# Patient Record
Sex: MALE | Race: BLACK OR AFRICAN AMERICAN | Employment: OTHER | URBAN - NONMETROPOLITAN AREA
[De-identification: names, ages, dates, MRNs, and addresses within clinical notes are randomized per-mention and may not be internally consistent; named-entity substitution may affect disease eponyms.]

---

## 2021-02-09 ENCOUNTER — HOSPITAL ENCOUNTER (EMERGENCY)
Age: 76
Discharge: HOME | DRG: 177 | End: 2021-02-09
Payer: MEDICARE

## 2021-02-09 ENCOUNTER — APPOINTMENT (OUTPATIENT)
Dept: GENERAL RADIOLOGY | Age: 76
DRG: 177 | End: 2021-02-09
Attending: EMERGENCY MEDICINE
Payer: MEDICARE

## 2021-02-09 VITALS
HEART RATE: 77 BPM | DIASTOLIC BLOOD PRESSURE: 87 MMHG | OXYGEN SATURATION: 100 % | RESPIRATION RATE: 19 BRPM | SYSTOLIC BLOOD PRESSURE: 143 MMHG | TEMPERATURE: 98.4 F | WEIGHT: 210 LBS | HEIGHT: 68 IN | BODY MASS INDEX: 31.83 KG/M2

## 2021-02-09 DIAGNOSIS — R73.9 HYPERGLYCEMIA: ICD-10-CM

## 2021-02-09 DIAGNOSIS — J18.9 PNEUMONIA OF BOTH LUNGS DUE TO INFECTIOUS ORGANISM, UNSPECIFIED PART OF LUNG: Primary | ICD-10-CM

## 2021-02-09 LAB
ALBUMIN SERPL-MCNC: 2.4 G/DL (ref 3.5–5)
ALBUMIN/GLOB SERPL: 0.5 {RATIO} (ref 1.1–2.2)
ALP SERPL-CCNC: 67 U/L (ref 45–117)
ALT SERPL-CCNC: 22 U/L (ref 12–78)
ANION GAP SERPL CALC-SCNC: 13 MMOL/L (ref 5–15)
APPEARANCE UR: CLEAR
AST SERPL W P-5'-P-CCNC: 21 U/L (ref 15–37)
BACTERIA URNS QL MICRO: NEGATIVE /HPF
BASOPHILS # BLD: 0 K/UL (ref 0–0.2)
BASOPHILS NFR BLD: 1 % (ref 0–2.5)
BILIRUB SERPL-MCNC: 0.2 MG/DL (ref 0.2–1)
BILIRUB UR QL: NEGATIVE
BUN SERPL-MCNC: 49 MG/DL (ref 6–20)
BUN/CREAT SERPL: 15 (ref 12–20)
CA-I BLD-MCNC: 9 MG/DL (ref 8.5–10.1)
CHLORIDE SERPL-SCNC: 100 MMOL/L (ref 97–108)
CO2 SERPL-SCNC: 22 MMOL/L (ref 21–32)
COLOR UR: ABNORMAL
CREAT SERPL-MCNC: 3.35 MG/DL (ref 0.7–1.3)
DEPRECATED S PYO AG THROAT QL EIA: NEGATIVE
EOSINOPHIL # BLD: 0 K/UL (ref 0–0.7)
EOSINOPHIL NFR BLD: 0 % (ref 0.9–2.9)
ERYTHROCYTE [DISTWIDTH] IN BLOOD BY AUTOMATED COUNT: 15.7 % (ref 11.5–14.5)
ETHANOL SERPL-MCNC: <4 MG/DL
GLOBULIN SER CALC-MCNC: 5.1 G/DL (ref 2–4)
GLUCOSE BLD STRIP.AUTO-MCNC: 384 MG/DL (ref 65–100)
GLUCOSE BLD STRIP.AUTO-MCNC: 426 MG/DL (ref 65–100)
GLUCOSE BLD STRIP.AUTO-MCNC: 433 MG/DL (ref 65–100)
GLUCOSE BLD STRIP.AUTO-MCNC: 500 MG/DL (ref 65–100)
GLUCOSE SERPL-MCNC: 514 MG/DL (ref 65–100)
GLUCOSE UR STRIP.AUTO-MCNC: >1000 MG/DL
HCT VFR BLD AUTO: 34.4 % (ref 41–53)
HGB BLD-MCNC: 11.3 G/DL (ref 13.5–17.5)
HGB UR QL STRIP: ABNORMAL
INR PPP: 1 (ref 0.9–1.1)
KETONES SERPL QL: NEGATIVE
KETONES UR QL STRIP.AUTO: NEGATIVE MG/DL
LEUKOCYTE ESTERASE UR QL STRIP.AUTO: NEGATIVE
LYMPHOCYTES # BLD: 0.3 K/UL (ref 1–4.8)
LYMPHOCYTES NFR BLD: 3 % (ref 20.5–51.1)
MAGNESIUM SERPL-MCNC: 2.3 MG/DL (ref 1.6–2.4)
MCH RBC QN AUTO: 32 PG (ref 31–34)
MCHC RBC AUTO-ENTMCNC: 32.8 G/DL (ref 31–36)
MCV RBC AUTO: 97.6 FL (ref 80–100)
MONOCYTES # BLD: 0.4 K/UL (ref 0.2–2.4)
MONOCYTES NFR BLD: 4 % (ref 1.7–9.3)
NEUTS SEG # BLD: 7.3 K/UL (ref 1.8–7.7)
NEUTS SEG NFR BLD: 92 % (ref 42–75)
NITRITE UR QL STRIP.AUTO: NEGATIVE
NRBC # BLD: 0 K/UL
NRBC BLD-RTO: 0 PER 100 WBC
PERFORMED BY, TECHID: ABNORMAL
PH UR STRIP: 5.5 [PH] (ref 5–8)
PLATELET # BLD AUTO: 236 K/UL
PMV BLD AUTO: 9.2 FL (ref 6.5–11.5)
POTASSIUM SERPL-SCNC: 5.1 MMOL/L (ref 3.5–5.1)
PROT SERPL-MCNC: 7.5 G/DL (ref 6.4–8.2)
PROT UR STRIP-MCNC: >300 MG/DL
PROTHROMBIN TIME: 10.2 SEC (ref 9–11.1)
RBC # BLD AUTO: 3.53 M/UL (ref 4.5–5.9)
RBC #/AREA URNS HPF: NORMAL /HPF (ref 0–3)
SARS-COV-2, COV2: NORMAL
SODIUM SERPL-SCNC: 135 MMOL/L (ref 136–145)
SP GR UR REFRACTOMETRY: 1.02 (ref 1–1.03)
UROBILINOGEN UR QL STRIP.AUTO: 0.2 EU/DL (ref 0.2–1)
WBC # BLD AUTO: 7.9 K/UL (ref 4.4–11.3)
WBC URNS QL MICRO: NORMAL /HPF (ref 0–5)

## 2021-02-09 PROCEDURE — 99284 EMERGENCY DEPT VISIT MOD MDM: CPT

## 2021-02-09 PROCEDURE — 87880 STREP A ASSAY W/OPTIC: CPT

## 2021-02-09 PROCEDURE — 74011000258 HC RX REV CODE- 258: Performed by: EMERGENCY MEDICINE

## 2021-02-09 PROCEDURE — 80053 COMPREHEN METABOLIC PANEL: CPT

## 2021-02-09 PROCEDURE — 74011636637 HC RX REV CODE- 636/637: Performed by: EMERGENCY MEDICINE

## 2021-02-09 PROCEDURE — 81001 URINALYSIS AUTO W/SCOPE: CPT

## 2021-02-09 PROCEDURE — 96375 TX/PRO/DX INJ NEW DRUG ADDON: CPT

## 2021-02-09 PROCEDURE — 36415 COLL VENOUS BLD VENIPUNCTURE: CPT

## 2021-02-09 PROCEDURE — 82009 KETONE BODYS QUAL: CPT

## 2021-02-09 PROCEDURE — 71045 X-RAY EXAM CHEST 1 VIEW: CPT

## 2021-02-09 PROCEDURE — U0003 INFECTIOUS AGENT DETECTION BY NUCLEIC ACID (DNA OR RNA); SEVERE ACUTE RESPIRATORY SYNDROME CORONAVIRUS 2 (SARS-COV-2) (CORONAVIRUS DISEASE [COVID-19]), AMPLIFIED PROBE TECHNIQUE, MAKING USE OF HIGH THROUGHPUT TECHNOLOGIES AS DESCRIBED BY CMS-2020-01-R: HCPCS

## 2021-02-09 PROCEDURE — 82077 ASSAY SPEC XCP UR&BREATH IA: CPT

## 2021-02-09 PROCEDURE — 74011250636 HC RX REV CODE- 250/636: Performed by: EMERGENCY MEDICINE

## 2021-02-09 PROCEDURE — 96374 THER/PROPH/DIAG INJ IV PUSH: CPT

## 2021-02-09 PROCEDURE — 83735 ASSAY OF MAGNESIUM: CPT

## 2021-02-09 PROCEDURE — 82962 GLUCOSE BLOOD TEST: CPT

## 2021-02-09 PROCEDURE — 85025 COMPLETE CBC W/AUTO DIFF WBC: CPT

## 2021-02-09 PROCEDURE — 96376 TX/PRO/DX INJ SAME DRUG ADON: CPT

## 2021-02-09 PROCEDURE — 87070 CULTURE OTHR SPECIMN AEROBIC: CPT

## 2021-02-09 PROCEDURE — 85610 PROTHROMBIN TIME: CPT

## 2021-02-09 RX ORDER — AZITHROMYCIN 500 MG/1
TABLET, FILM COATED ORAL
Qty: 5 TAB | Refills: 0 | Status: SHIPPED | OUTPATIENT
Start: 2021-02-09 | End: 2021-02-17

## 2021-02-09 RX ORDER — DEXAMETHASONE SODIUM PHOSPHATE 4 MG/ML
6 INJECTION, SOLUTION INTRA-ARTICULAR; INTRALESIONAL; INTRAMUSCULAR; INTRAVENOUS; SOFT TISSUE
Status: COMPLETED | OUTPATIENT
Start: 2021-02-09 | End: 2021-02-09

## 2021-02-09 RX ORDER — PREDNISONE 20 MG/1
40 TABLET ORAL DAILY
Qty: 10 TAB | Refills: 0 | Status: SHIPPED | OUTPATIENT
Start: 2021-02-09 | End: 2021-02-17

## 2021-02-09 RX ADMIN — CEFTRIAXONE SODIUM 1 G: 1 INJECTION, POWDER, FOR SOLUTION INTRAMUSCULAR; INTRAVENOUS at 15:53

## 2021-02-09 RX ADMIN — INSULIN HUMAN 15 UNITS: 100 INJECTION, SOLUTION PARENTERAL at 13:26

## 2021-02-09 RX ADMIN — INSULIN HUMAN 15 UNITS: 100 INJECTION, SOLUTION PARENTERAL at 15:53

## 2021-02-09 RX ADMIN — DEXAMETHASONE SODIUM PHOSPHATE 6 MG: 4 INJECTION, SOLUTION INTRAMUSCULAR; INTRAVENOUS at 15:56

## 2021-02-09 RX ADMIN — SODIUM CHLORIDE 1000 ML: 9 INJECTION, SOLUTION INTRAVENOUS at 12:46

## 2021-02-09 NOTE — ED PROVIDER NOTES
EMERGENCY DEPARTMENT HISTORY AND PHYSICAL EXAM      Date: 2/9/2021  Patient Name: Ronne Lefort    History of Presenting Illness     Chief Complaint   Patient presents with    Generalized Body Aches       History Provided By: Patient    HPI: Ronne Lefort, 70 y.o. male with a past medical history significant diabetes, hypertension and COPD presents to the ED with cc of cough and sore throat for 3 days cough is nonproductive; patient states he has not eaten for 3 days and therefore has not taken his insulin; denies any significant weight loss    There are no other complaints, changes, or physical findings at this time. PCP: Landen, MD Carrie    No current facility-administered medications on file prior to encounter. Current Outpatient Medications on File Prior to Encounter   Medication Sig Dispense Refill    traMADol (ULTRAM) 50 mg tablet Take 50 mg by mouth every six (6) hours as needed for Pain.  insulin glargine (LANTUS) 100 unit/mL injection 34 Units by SubCUTAneous route nightly.  insulin aspart (NOVOLOG) 100 unit/mL injection by SubCUTAneous route. 26 units in the am, 22 units at noon      gabapentin (NEURONTIN) 300 mg capsule Take 300 mg by mouth three (3) times daily.  cholecalciferol (VITAMIN D3) 1,000 unit cap Take 1,000 Units by mouth daily.  aspirin delayed-release 81 mg tablet Take 81 mg by mouth daily.  methadone (DOLOPHINE) 10 mg tablet Take 10 mg by mouth four (4) times daily.  oxyCODONE-acetaminophen (PERCOCET 10)  mg per tablet Take 1 Tab by mouth every six (6) hours as needed for Pain.  ondansetron (ZOFRAN ODT) 4 mg disintegrating tablet Take 1 Tab by mouth every eight (8) hours as needed for Nausea. 12 Tab 0    [DISCONTINUED] famotidine (PEPCID) 20 mg tablet Take 20 mg by mouth two (2) times a day.          Past History     Past Medical History:  Past Medical History:   Diagnosis Date    Asthma     B12 deficiency     CAD (coronary artery disease)     Cataract     Chronic obstructive pulmonary disease (HCC)     CKD (chronic kidney disease)     Depression     [facility] [address]     DJD (degenerative joint disease)     Heart attack ([facility] [address]     Hypertension     Long term prescription opiate use     Neuropathy     Rheumatoid arthritis (HCC)     Ulcer        Past Surgical History:  Past Surgical History:   Procedure Laterality Date    HX ANGIOPLASTY      HX HEENT      HX ORTHOPAEDIC      MA CARDIAC SURG PROCEDURE UNLIST      RCA stent       Family History:  History reviewed. No pertinent family history. Social History:  Social History     Tobacco Use    Smoking status: Former Smoker    Smokeless tobacco: Former User   Substance Use Topics    Alcohol use: No    Drug use: No       Allergies:  No Known Allergies      Review of Systems     Review of Systems   Constitutional: Positive for appetite change. Negative for fever. HENT: Positive for sore throat. Negative for rhinorrhea. Eyes: Negative for discharge and visual disturbance. Respiratory: Negative for cough and shortness of breath. Cardiovascular: Negative for chest pain and leg swelling. Gastrointestinal: Positive for nausea. Negative for diarrhea. Endocrine: Negative for polydipsia and polyuria. Genitourinary: Negative for dysuria and urgency. Musculoskeletal: Negative for back pain and neck pain. Skin: Negative for color change and pallor. Neurological: Negative for weakness and numbness. Psychiatric/Behavioral: Negative. Physical Exam     Physical Exam  Vitals signs and nursing note reviewed. Constitutional:       Appearance: Normal appearance. HENT:      Head: Normocephalic and atraumatic. Mouth/Throat:      Mouth: Mucous membranes are dry. Eyes:      Extraocular Movements: Extraocular movements intact. Conjunctiva/sclera: Conjunctivae normal.      Pupils: Pupils are equal, round, and reactive to light.    Neck:      Musculoskeletal: Normal range of motion and neck supple. Cardiovascular:      Rate and Rhythm: Normal rate and regular rhythm. Pulses: Normal pulses. Heart sounds: Normal heart sounds. Pulmonary:      Effort: Pulmonary effort is normal.      Breath sounds: Normal breath sounds. Abdominal:      General: Bowel sounds are normal.      Palpations: Abdomen is soft. Musculoskeletal: Normal range of motion. General: No tenderness or signs of injury. Skin:     General: Skin is warm and dry. Capillary Refill: Capillary refill takes less than 2 seconds. Neurological:      General: No focal deficit present. Mental Status: He is alert and oriented to person, place, and time. Psychiatric:         Mood and Affect: Mood normal.         Behavior: Behavior normal.         Lab and Diagnostic Study Results     Labs -     Recent Results (from the past 12 hour(s))   GLUCOSE, POC    Collection Time: 02/09/21 11:42 AM   Result Value Ref Range    Glucose (POC) 500 (H) 65 - 100 mg/dL    Performed by Wenda Bloch        Radiologic Studies -   @lastxrresult@  CT Results  (Last 48 hours)    None        CXR Results  (Last 48 hours)    None            Medical Decision Making   - I am the first provider for this patient. - I reviewed the vital signs, available nursing notes, past medical history, past surgical history, family history and social history. - Initial assessment performed. The patients presenting problems have been discussed, and they are in agreement with the care plan formulated and outlined with them. I have encouraged them to ask questions as they arise throughout their visit. Vital Signs-Reviewed the patient's vital signs.   Patient Vitals for the past 12 hrs:   Temp Pulse Resp BP SpO2   02/09/21 1130 98.4 °F (36.9 °C) (!) 108 17 136/88 94 %       Records Reviewed: Nursing Notes    The patient presents with body aches with a differential diagnosis of pneumonia, influenza, COVID-19      ED Course:     ED Course as of Feb 09 1555   Tue Feb 09, 2021   1457 NEUTROPHILS(!): 80 [SB]      ED Course User Index  [SB] Duncan Perez MD       Provider Notes (Medical Decision Making):     MDM  Number of Diagnoses or Management Options  Hyperglycemia  Pneumonia of both lungs due to infectious organism, unspecified part of lung  Diagnosis management comments: Patient was called today and informed of results of his Covid test         Procedures   Medical Decision Makingedical Decision Making  Performed by: Rohit Beavers MD  PROCEDURES:  Procedures       Disposition   Disposition: Condition stable and improved  DC- Adult Discharges: All of the diagnostic tests were reviewed and questions answered. Diagnosis, care plan and treatment options were discussed. The patient understands the instructions and will follow up as directed. The patients results have been reviewed with them. They have been counseled regarding their diagnosis. The patient verbally convey understanding and agreement of the signs, symptoms, diagnosis, treatment and prognosis and additionally agrees to follow up as recommended with their PCP in 24 - 48 hours. They also agree with the care-plan and convey that all of their questions have been answered. I have also put together some discharge instructions for them that include: 1) educational information regarding their diagnosis, 2) how to care for their diagnosis at home, as well a 3) list of reasons why they would want to return to the ED prior to their follow-up appointment, should their condition change. DISCHARGE PLAN:  1. Current Discharge Medication List      CONTINUE these medications which have NOT CHANGED    Details   traMADol (ULTRAM) 50 mg tablet Take 50 mg by mouth every six (6) hours as needed for Pain. insulin glargine (LANTUS) 100 unit/mL injection 34 Units by SubCUTAneous route nightly.       insulin aspart (NOVOLOG) 100 unit/mL injection by SubCUTAneous route. 26 units in the am, 22 units at noon      gabapentin (NEURONTIN) 300 mg capsule Take 300 mg by mouth three (3) times daily. cholecalciferol (VITAMIN D3) 1,000 unit cap Take 1,000 Units by mouth daily. aspirin delayed-release 81 mg tablet Take 81 mg by mouth daily. methadone (DOLOPHINE) 10 mg tablet Take 10 mg by mouth four (4) times daily. oxyCODONE-acetaminophen (PERCOCET 10)  mg per tablet Take 1 Tab by mouth every six (6) hours as needed for Pain. ondansetron (ZOFRAN ODT) 4 mg disintegrating tablet Take 1 Tab by mouth every eight (8) hours as needed for Nausea. Qty: 12 Tab, Refills: 0           2. Follow-up Information    None       3. Return to ED if worse   4. Current Discharge Medication List            Diagnosis     Clinical Impression: No diagnosis found. Attestations:    Maury Jose MD    Please note that this dictation was completed with [employer], the computer voice recognition software. Quite often unanticipated grammatical, syntax, homophones, and other interpretive errors are inadvertently transcribed by the computer software. Please disregard these errors. Please excuse any errors that have escaped final proofreading. Thank you.

## 2021-02-09 NOTE — ED TRIAGE NOTES
09/12/20 1140   Final Note   Assessment Type Final Discharge Note   Anticipated Discharge Disposition Home   What phone number can be called within the next 1-3 days to see how you are doing after discharge?   ([phone])   Hospital Follow Up  Appt(s) scheduled? Yes   Discharge plans and expectations educations in teach back method with documentation complete? Yes   Right Care Referral Info   Post Acute Recommendation No Care   Post-Acute Status   Post-Acute Authorization Other  (Home with follow-up)   Other Status No Post-Acute Service Needs   Discharge Delays None known at this time      Per EMS pts FS glucose was 587.

## 2021-02-09 NOTE — ED NOTES
Pt given discharge and follow up instructions. Pt educated on use of pulse oximeter. Pt able to show return demonstration. Pt advised that a healthcare professional will be following up with him in 24-48hrs regarding readings. Pt shows verbal understanding. Pt given prescriptions at discharge. Pt has no other questions at this time. Pt waiting for daughter to pick him up for discharge.

## 2021-02-10 ENCOUNTER — PATIENT OUTREACH (OUTPATIENT)
Dept: CASE MANAGEMENT | Age: 76
End: 2021-02-10

## 2021-02-10 NOTE — PROGRESS NOTES
Patient contacted regarding [facility]. Discussed COVID-19 related testing which was pending at this time. Test results were pending. Patient informed of results, if available? yes     Care Transition Nurse/ Ambulatory Care Manager contacted the patient by telephone to perform post discharge assessment. Call within 2 business days of discharge: Yes Verified name and  with patient as identifiers. Provided introduction to self, and explanation of the CTN/ACM role, and reason for call due to risk factors for infection and/or exposure to COVID-19. Symptoms reviewed with patient who verbalized the following symptoms: no new symptoms and no worsening symptoms      Due to no new or worsening symptoms encounter was not routed to provider for escalation. Discussed follow-up appointments. If no appointment was previously scheduled, appointment scheduling offered:  Pt's daughter reports that they are working on getting pt a [facility] follow up appointment(s): No future appointments. Non-[facility] follow up appointment(s): NA     Advance Care Planning:   Does patient have an Advance Directive:  not on file. Patient has following risk factors of: pneumonia, diabetes and ED visit to [facility] on [facility]. CTN/ACM reviewed discharge instructions, medical action plan and red flags such as increased shortness of breath, increasing fever and signs of decompensation with patient who verbalized understanding. Discussed exposure protocols and quarantine with CDC Guidelines What to do if you are sick with coronavirus disease .  Patient was given an opportunity for questions and concerns. The patient agrees to contact the [employer] exposure line [phone], pt's daughter reports that they will follow up with PCP and PCP office for questions related to their healthcare. CTN/ACM provided contact information for future needs.     Reviewed and educated patient on any new and changed medications related to discharge diagnosis Patient/family/caregiver given information for [employer] and agrees to enroll no      Plan for follow-up call in 5-7 days based on severity of symptoms and risk factors. Pt confirmed that he has ED prescribed medications and has maintenance medications. Pt attempted to use pulse ox but his hands are too cold to get a reading. Pt's daughter reports that she will watch this. They declined [facility] and pt reports they have a box at home to help pt. She did not clarify what this box is. Pt and daughter are agreeable to a follow up call in a week and was reminded to seek emergent care for SOB. Will call in 7 days.

## 2021-02-11 ENCOUNTER — PATIENT OUTREACH (OUTPATIENT)
Dept: CASE MANAGEMENT | Age: 76
End: 2021-02-11

## 2021-02-11 ENCOUNTER — HOSPITAL ENCOUNTER (EMERGENCY)
Age: 76
Discharge: HOME | DRG: 177 | End: 2021-02-11
Attending: EMERGENCY MEDICINE
Payer: MEDICARE

## 2021-02-11 VITALS
OXYGEN SATURATION: 96 % | RESPIRATION RATE: 20 BRPM | WEIGHT: 210 LBS | DIASTOLIC BLOOD PRESSURE: 81 MMHG | SYSTOLIC BLOOD PRESSURE: 153 MMHG | HEART RATE: 98 BPM | BODY MASS INDEX: 31.83 KG/M2 | TEMPERATURE: 99 F | HEIGHT: 68 IN

## 2021-02-11 DIAGNOSIS — J12.82 PNEUMONIA DUE TO COVID-19 VIRUS: Primary | ICD-10-CM

## 2021-02-11 DIAGNOSIS — U07.1 PNEUMONIA DUE TO COVID-19 VIRUS: Primary | ICD-10-CM

## 2021-02-11 LAB
BACTERIA SPEC CULT: NORMAL
BACTERIA SPEC CULT: NORMAL
SARS-COV-2, COV2NT: DETECTED
SPECIAL REQUESTS,SREQ: NORMAL

## 2021-02-11 PROCEDURE — 99283 EMERGENCY DEPT VISIT LOW MDM: CPT

## 2021-02-11 RX ORDER — OMEPRAZOLE 40 MG/1
40 CAPSULE, DELAYED RELEASE ORAL DAILY
COMMUNITY

## 2021-02-11 NOTE — PROGRESS NOTES
Patient contacted regarding COVID-19 risk and screening. Discussed COVID-19 related testing which was available at this time. Test results were positive. Patient informed of results, if available? Yes, pt confirmed that hospital called with results. Care Transition Nurse/ Ambulatory Care Manager/ LPN Care Coordinator contacted the patient by telephone to perform follow-up assessment. Verified name and  with patient as identifiers. Patient has following risk factors of: asthma, diabetes, chronic kidney disease and ED visit and COVID19 + test result. Symptoms reviewed with patient who verbalized the following symptoms: no new symptoms and no worsening symptoms. Due to no new or worsening symptoms encounter pt's daughter reports that pt is changing PCPs on last call routed to provider for escalation. Education provided regarding infection prevention, and signs and symptoms of COVID-19 and when to seek medical attention with patient who verbalized understanding. Discussed exposure protocols and quarantine from [address] you at higher risk for severe illness  and given an opportunity for questions and concerns. The patient agrees to contact the COVID-19 hotline [phone] or PCP office for questions related to their healthcare. CTN/ACM/LPN provided contact information for future reference. From CDC: Are you at higher risk for severe illness?  Wash your hands often.  Avoid close contact (6 feet, which is about two arm lengths) with people who are sick.  Put distance between yourself and other people if COVID-19 is spreading in your community.  Clean and disinfect frequently touched surfaces.  Avoid all cruise travel and non-essential air travel.  Call your healthcare professional if you have concerns about COVID-19 and your underlying condition or if you are sick.     For more information on steps you can take to protect yourself, see CDC's How to Protect Yourself Plan for follow-up call in 3-5 days based on severity of symptoms and risk factors. Called pt to follow up on ED discharge and to check on pulse oximeter readings. Advised pt to warm hands by washing with warm water, pt reports that he is self-quarantined to a room and cannot get to bathroom. Pt advised to try rubbing his hands together, placing them in his armpits or under the blankets. Pt is attempting to warm hands. Pt states that he sees a 96 and a question jewell. Pt is speaking in complete sentences. Pt reports [facility] at [facility]. Pt states that he is alone. Pt reports that he can only see one number. Pt reports that his daughter will be coming over later today. Pt is able to run his hands under warm water. Still trying to get pulse ox reading. Pt reports spo2 at 89%  Pt reports that it is no 94% after sitting. Pt reports that while sitting and talking with me it is between 92 and 93%. Used teach back with pt who states that he will call 911 for any worsening symptoms and spo2 below 90%.

## 2021-02-11 NOTE — ED TRIAGE NOTES
Pt was recently diagnosed with Covid and given a pulse ox to take home. States that his O2 sat was low at home. Pt's fingers are cold. O2 sat is currently 96% on room air.

## 2021-02-12 ENCOUNTER — PATIENT OUTREACH (OUTPATIENT)
Dept: CASE MANAGEMENT | Age: 76
End: 2021-02-12

## 2021-02-12 NOTE — ED NOTES
[facility]

## 2021-02-12 NOTE — PROGRESS NOTES
Called pt to follow up on ED visit to [facility] on 2/11/2021. Unable to LVM on pt's telephone. Called emergency contact, unable to LVM as VM is not set up.

## 2021-02-12 NOTE — ED PROVIDER NOTES
EMERGENCY DEPARTMENT HISTORY AND PHYSICAL EXAM      Date: 2/11/2021  Patient Name: Huong Arias    History of Presenting Illness     Chief Complaint   Patient presents with    Positive For Covid-19    Cough       History Provided By: Patient    HPI: Huong Arias, 70 y.o. male with a past medical history significant COVID-19 pneumonia presents to the ED with cc of pulse ox saturation readings of 89% at home; patient denies any worsening of his shortness of breath no cough no fever no chills    There are no other complaints, changes, or physical findings at this time. PCP: Landen, MD Carrie    No current facility-administered medications on file prior to encounter. Current Outpatient Medications on File Prior to Encounter   Medication Sig Dispense Refill    omeprazole (PRILOSEC) 40 mg capsule Take 40 mg by mouth daily.  azithromycin (Zithromax TRI-MERCEDEZ) 500 mg tab 1 tab p.o. daily for 5 days 5 Tab 0    predniSONE (DELTASONE) 20 mg tablet Take 40 mg by mouth daily for 5 days. With Breakfast 10 Tab 0    traMADol (ULTRAM) 50 mg tablet Take 50 mg by mouth every six (6) hours as needed for Pain.  insulin glargine (LANTUS) 100 unit/mL injection 60 Units by SubCUTAneous route nightly.  insulin aspart (NOVOLOG) 100 unit/mL injection 26 Units by SubCUTAneous route Every morning.  gabapentin (NEURONTIN) 300 mg capsule Take 300 mg by mouth three (3) times daily.  cholecalciferol (VITAMIN D3) 1,000 unit cap Take 1,000 Units by mouth daily.  aspirin delayed-release 81 mg tablet Take 81 mg by mouth daily.  methadone (DOLOPHINE) 10 mg tablet Take 10 mg by mouth four (4) times daily.  ondansetron (ZOFRAN ODT) 4 mg disintegrating tablet Take 1 Tab by mouth every eight (8) hours as needed for Nausea.  12 Tab 0       Past History     Past Medical History:  Past Medical History:   Diagnosis Date    Asthma     B12 deficiency     CAD (coronary artery disease)     Cataract     Chronic obstructive pulmonary disease (HCC)     CKD (chronic kidney disease)     Depression     Diabetes ([facility] [address]     DJD (degenerative joint disease)     Heart attack ([facility] [address]     Hypertension     Long term prescription opiate use     Neuropathy     Rheumatoid arthritis (HCC)     Ulcer        Past Surgical History:  Past Surgical History:   Procedure Laterality Date    HX ANGIOPLASTY      HX HEENT      HX ORTHOPAEDIC      CA CARDIAC SURG PROCEDURE UNLIST      RCA stent       Family History:  History reviewed. No pertinent family history. Social History:  Social History     Tobacco Use    Smoking status: Former Smoker    Smokeless tobacco: Former User   Substance Use Topics    Alcohol use: No    Drug use: No       Allergies:  No Known Allergies      Review of Systems     Review of Systems   Constitutional: Negative for chills and fever. HENT: Negative for rhinorrhea and sore throat. Eyes: Negative for photophobia and discharge. Respiratory: Negative for cough and shortness of breath. Cardiovascular: Negative for chest pain and leg swelling. Gastrointestinal: Negative for vomiting. Endocrine: Negative for polydipsia and polyuria. Genitourinary: Negative for dysuria and urgency. Musculoskeletal: Negative for back pain and neck pain. Skin: Negative for color change and pallor. Neurological: Negative for weakness and numbness. Psychiatric/Behavioral: Negative. Physical Exam     Physical Exam  Vitals signs and nursing note reviewed. Constitutional:       Appearance: Normal appearance. HENT:      Head: Normocephalic and atraumatic. Mouth/Throat:      Mouth: Mucous membranes are moist.      Pharynx: Oropharynx is clear. Eyes:      Extraocular Movements: Extraocular movements intact. Conjunctiva/sclera: Conjunctivae normal.      Pupils: Pupils are equal, round, and reactive to light. Neck:      Musculoskeletal: Normal range of motion and neck supple. Cardiovascular:      Rate and Rhythm: Normal rate and regular rhythm. Pulses: Normal pulses. Heart sounds: Normal heart sounds. Pulmonary:      Effort: Pulmonary effort is normal.      Breath sounds: Normal breath sounds. Abdominal:      General: Bowel sounds are normal.      Palpations: Abdomen is soft. Musculoskeletal: Normal range of motion. Skin:     General: Skin is warm and dry. Capillary Refill: Capillary refill takes less than 2 seconds. Neurological:      General: No focal deficit present. Mental Status: He is alert and oriented to person, place, and time. Psychiatric:         Mood and Affect: Mood normal.         Behavior: Behavior normal.         Lab and Diagnostic Study Results     Labs -   No results found for this or any previous visit (from the past 12 hour(s)). Radiologic Studies -   @lastxrresult@  CT Results  (Last 48 hours)    None        CXR Results  (Last 48 hours)    None            Medical Decision Making   - I am the first provider for this patient. - I reviewed the vital signs, available nursing notes, past medical history, past surgical history, family history and social history. - Initial assessment performed. The patients presenting problems have been discussed, and they are in agreement with the care plan formulated and outlined with them. I have encouraged them to ask questions as they arise throughout their visit. Vital Signs-Reviewed the patient's vital signs. No data found. Records Reviewed: Nursing Notes    The patient presents with shortness of breath with a differential diagnosis of pneumonia, influenza, CHF      ED Course:          Provider Notes (Medical Decision Making): MDM       Procedures   Medical Decision Makingedical Decision Making  Performed by: Claudia Brannon MD  PROCEDURES:  Procedures       Disposition   Disposition: Condition stable and resolved  DC- Adult Discharges:  All of the diagnostic tests were reviewed and questions answered. Diagnosis, care plan and treatment options were discussed. The patient understands the instructions and will follow up as directed. The patients results have been reviewed with them. They have been counseled regarding their diagnosis. The patient verbally convey understanding and agreement of the signs, symptoms, diagnosis, treatment and prognosis and additionally agrees to follow up as recommended with their PCP in 24 - 48 hours. They also agree with the care-plan and convey that all of their questions have been answered. I have also put together some discharge instructions for them that include: 1) educational information regarding their diagnosis, 2) how to care for their diagnosis at home, as well a 3) list of reasons why they would want to return to the ED prior to their follow-up appointment, should their condition change. Discharged    DISCHARGE PLAN:  1. Cannot display discharge medications since this patient is not currently admitted. 2.   Follow-up Information    None       3. Return to ED if worse   4. Discharge Medication List as of 2/11/2021  8:47 PM            Diagnosis     Clinical Impression:   1. Pneumonia due to COVID-19 virus        Attestations:    Tati Luther MD    Please note that this dictation was completed with [employer], the [employer] voice recognition software. Quite often unanticipated grammatical, syntax, homophones, and other interpretive errors are inadvertently transcribed by the computer software. Please disregard these errors. Please excuse any errors that have escaped final proofreading. Thank you.

## 2021-02-12 NOTE — PROGRESS NOTES
Called pt to follow up on pulse ox reading. Pt verified name and . Pt states 96%. Pt is agreeable to a follow up call on Tuesday.

## 2021-02-12 NOTE — ED NOTES
Pt is alert and oriented x4, NAD observed at this time. Pt was given and educated on discharge instructions, and discharge instructions were also relayed and educated to pts daughter. Pt wheeled out to car driven by daughter in wheelchair to maintain respiratory effort, but pt ambulated to chair in hallway from bed in room and from wheelchair to car with no help.

## 2021-02-12 NOTE — PROGRESS NOTES
Patient contacted regarding COVID-19 risk and screening. Discussed COVID-19 related testing which was available at this time. Test results were positive. Patient informed of results, if available? yes     Care Transition Nurse/ Ambulatory Care Manager/ LPN Care Coordinator contacted the patient by telephone to perform follow-up assessment. Verified name and  with patient as identifiers. Patient has following risk factors of: COPD, asthma, diabetes, chronic kidney disease and ED visits with diagnosis of PNA due to [address]. Symptoms reviewed with patient who verbalized the following symptoms: no new symptoms, no worsening symptoms and pt states, \"I don't feel too good. \". Due to no new or worsening symptoms encounter was not routed to provider for escalation. Education provided regarding infection prevention, and signs and symptoms of COVID-19 and when to seek medical attention with patient who verbalized understanding. Discussed exposure protocols and quarantine from [address] you at higher risk for severe illness  and given an opportunity for questions and concerns. The patient agrees to contact the COVID-19 hotline [phone] or PCP office for questions related to their healthcare. CTN/ACM/LPN provided contact information for future reference. From CDC: Are you at higher risk for severe illness?  Wash your hands often.  Avoid close contact (6 feet, which is about two arm lengths) with people who are sick.  Put distance between yourself and other people if COVID-19 is spreading in your community.  Clean and disinfect frequently touched surfaces.  Avoid all cruise travel and non-essential air travel.  Call your healthcare professional if you have concerns about COVID-19 and your underlying condition or if you are sick.     For more information on steps you can take to protect yourself, see CDC's How to [place] for follow-up call in 3-5 days based on severity of symptoms and risk factors.    Called pt to follow up on ED visit on 2/11/2021 and to follow up on pulse oximeter reading.  Pt reports that he is feeling about the same, \"I don't feel good.\"  Pt has cool hands and it having a difficult time getting a reading.  Pt has washed his hands with warm water.  Pt reports that he dropped the pulse ox and is waiting for his daughter to put it back together as the batteries came out and pt reports that his hands don't work too well.  Pt will call me back.

## 2021-02-13 ENCOUNTER — APPOINTMENT (OUTPATIENT)
Dept: GENERAL RADIOLOGY | Age: 76
DRG: 177 | End: 2021-02-13
Attending: EMERGENCY MEDICINE
Payer: MEDICARE

## 2021-02-13 ENCOUNTER — HOSPITAL ENCOUNTER (INPATIENT)
Age: 76
LOS: 4 days | Discharge: HOME | DRG: 177 | End: 2021-02-17
Attending: EMERGENCY MEDICINE | Admitting: HOSPITALIST
Payer: MEDICARE

## 2021-02-13 DIAGNOSIS — U07.1 COVID-19: Primary | ICD-10-CM

## 2021-02-13 DIAGNOSIS — E16.2 HYPOGLYCEMIA: ICD-10-CM

## 2021-02-13 DIAGNOSIS — E86.0 DEHYDRATION: ICD-10-CM

## 2021-02-13 PROBLEM — R79.89 POSITIVE D DIMER: Status: ACTIVE | Noted: 2021-02-13

## 2021-02-13 PROBLEM — J44.9 COPD (CHRONIC OBSTRUCTIVE PULMONARY DISEASE) (HCC): Status: ACTIVE | Noted: 2021-02-13

## 2021-02-13 PROBLEM — N18.9 CKD (CHRONIC KIDNEY DISEASE): Status: ACTIVE | Noted: 2021-02-13

## 2021-02-13 PROBLEM — R06.00 DYSPNEA: Status: ACTIVE | Noted: 2021-02-13

## 2021-02-13 LAB
ANION GAP SERPL CALC-SCNC: 11 MMOL/L (ref 5–15)
BASOPHILS # BLD: 0 K/UL (ref 0–0.2)
BASOPHILS NFR BLD: 0 % (ref 0–2.5)
BUN SERPL-MCNC: 55 MG/DL (ref 6–20)
BUN/CREAT SERPL: 21 (ref 12–20)
CA-I BLD-MCNC: 8.8 MG/DL (ref 8.5–10.1)
CHLORIDE SERPL-SCNC: 108 MMOL/L (ref 97–108)
CO2 SERPL-SCNC: 24 MMOL/L (ref 21–32)
CREAT SERPL-MCNC: 2.63 MG/DL (ref 0.7–1.3)
D DIMER PPP FEU-MCNC: 4.72 MG/L FEU (ref 0.19–0.5)
EOSINOPHIL # BLD: 0 K/UL (ref 0–0.7)
EOSINOPHIL NFR BLD: 0 % (ref 0.9–2.9)
ERYTHROCYTE [DISTWIDTH] IN BLOOD BY AUTOMATED COUNT: 15.5 % (ref 11.5–14.5)
GLUCOSE BLD STRIP.AUTO-MCNC: 273 MG/DL (ref 65–100)
GLUCOSE BLD STRIP.AUTO-MCNC: 317 MG/DL (ref 65–100)
GLUCOSE BLD STRIP.AUTO-MCNC: 62 MG/DL (ref 65–100)
GLUCOSE SERPL-MCNC: 63 MG/DL (ref 65–100)
HCT VFR BLD AUTO: 35 % (ref 41–53)
HGB BLD-MCNC: 11.6 G/DL (ref 13.5–17.5)
INR PPP: 1 (ref 0.9–1.1)
LYMPHOCYTES # BLD: 0.8 K/UL (ref 1–4.8)
LYMPHOCYTES NFR BLD: 11 % (ref 20.5–51.1)
MAGNESIUM SERPL-MCNC: 2.1 MG/DL (ref 1.6–2.4)
MCH RBC QN AUTO: 31.8 PG (ref 31–34)
MCHC RBC AUTO-ENTMCNC: 33.1 G/DL (ref 31–36)
MCV RBC AUTO: 96.1 FL (ref 80–100)
MONOCYTES # BLD: 0.8 K/UL (ref 0.2–2.4)
MONOCYTES NFR BLD: 11 % (ref 1.7–9.3)
NEUTS SEG # BLD: 5.8 K/UL (ref 1.8–7.7)
NEUTS SEG NFR BLD: 78 % (ref 42–75)
NRBC # BLD: 0 K/UL
NRBC BLD-RTO: 0 PER 100 WBC
PERFORMED BY, TECHID: ABNORMAL
PLATELET # BLD AUTO: 397 K/UL
PMV BLD AUTO: 8.2 FL (ref 6.5–11.5)
POTASSIUM SERPL-SCNC: 3.6 MMOL/L (ref 3.5–5.1)
PROTHROMBIN TIME: 9.7 SEC (ref 9–11.1)
RBC # BLD AUTO: 3.64 M/UL (ref 4.5–5.9)
SODIUM SERPL-SCNC: 143 MMOL/L (ref 136–145)
WBC # BLD AUTO: 7.5 K/UL (ref 4.4–11.3)

## 2021-02-13 PROCEDURE — 85025 COMPLETE CBC W/AUTO DIFF WBC: CPT

## 2021-02-13 PROCEDURE — 36415 COLL VENOUS BLD VENIPUNCTURE: CPT

## 2021-02-13 PROCEDURE — 83735 ASSAY OF MAGNESIUM: CPT

## 2021-02-13 PROCEDURE — 82962 GLUCOSE BLOOD TEST: CPT

## 2021-02-13 PROCEDURE — 71045 X-RAY EXAM CHEST 1 VIEW: CPT

## 2021-02-13 PROCEDURE — 74011250637 HC RX REV CODE- 250/637: Performed by: HOSPITALIST

## 2021-02-13 PROCEDURE — 74011636637 HC RX REV CODE- 636/637: Performed by: HOSPITALIST

## 2021-02-13 PROCEDURE — 85610 PROTHROMBIN TIME: CPT

## 2021-02-13 PROCEDURE — 3E0333Z INTRODUCTION OF ANTI-INFLAMMATORY INTO PERIPHERAL VEIN, PERCUTANEOUS APPROACH: ICD-10-PCS | Performed by: HOSPITALIST

## 2021-02-13 PROCEDURE — 99284 EMERGENCY DEPT VISIT MOD MDM: CPT

## 2021-02-13 PROCEDURE — 85379 FIBRIN DEGRADATION QUANT: CPT

## 2021-02-13 PROCEDURE — 83036 HEMOGLOBIN GLYCOSYLATED A1C: CPT

## 2021-02-13 PROCEDURE — 84145 PROCALCITONIN (PCT): CPT

## 2021-02-13 PROCEDURE — 80048 BASIC METABOLIC PNL TOTAL CA: CPT

## 2021-02-13 PROCEDURE — 86140 C-REACTIVE PROTEIN: CPT

## 2021-02-13 PROCEDURE — 65270000029 HC RM PRIVATE

## 2021-02-13 PROCEDURE — 94640 AIRWAY INHALATION TREATMENT: CPT

## 2021-02-13 RX ORDER — SODIUM CHLORIDE 0.9 % (FLUSH) 0.9 %
5-40 SYRINGE (ML) INJECTION EVERY 8 HOURS
Status: DISCONTINUED | OUTPATIENT
Start: 2021-02-13 | End: 2021-02-17 | Stop reason: HOSPADM

## 2021-02-13 RX ORDER — ACETAMINOPHEN 650 MG/1
650 SUPPOSITORY RECTAL
Status: DISCONTINUED | OUTPATIENT
Start: 2021-02-13 | End: 2021-02-17 | Stop reason: HOSPADM

## 2021-02-13 RX ORDER — AMLODIPINE BESYLATE 5 MG/1
5 TABLET ORAL DAILY
Status: DISCONTINUED | OUTPATIENT
Start: 2021-02-13 | End: 2021-02-17 | Stop reason: HOSPADM

## 2021-02-13 RX ORDER — SODIUM CHLORIDE 0.9 % (FLUSH) 0.9 %
5-40 SYRINGE (ML) INJECTION AS NEEDED
Status: DISCONTINUED | OUTPATIENT
Start: 2021-02-13 | End: 2021-02-17 | Stop reason: HOSPADM

## 2021-02-13 RX ORDER — ASCORBIC ACID 500 MG
1000 TABLET ORAL 2 TIMES DAILY
Status: DISCONTINUED | OUTPATIENT
Start: 2021-02-13 | End: 2021-02-17 | Stop reason: HOSPADM

## 2021-02-13 RX ORDER — INSULIN GLARGINE 100 [IU]/ML
60 INJECTION, SOLUTION SUBCUTANEOUS
Status: DISCONTINUED | OUTPATIENT
Start: 2021-02-13 | End: 2021-02-15

## 2021-02-13 RX ORDER — AMOXICILLIN AND CLAVULANATE POTASSIUM 875; 125 MG/1; MG/1
1 TABLET, FILM COATED ORAL 2 TIMES DAILY WITH MEALS
Status: DISCONTINUED | OUTPATIENT
Start: 2021-02-13 | End: 2021-02-17 | Stop reason: HOSPADM

## 2021-02-13 RX ORDER — POLYETHYLENE GLYCOL 3350 17 G/17G
17 POWDER, FOR SOLUTION ORAL DAILY PRN
Status: DISCONTINUED | OUTPATIENT
Start: 2021-02-13 | End: 2021-02-17 | Stop reason: HOSPADM

## 2021-02-13 RX ORDER — MONTELUKAST SODIUM 10 MG/1
10 TABLET ORAL
Status: DISCONTINUED | OUTPATIENT
Start: 2021-02-13 | End: 2021-02-17 | Stop reason: HOSPADM

## 2021-02-13 RX ORDER — ACETAMINOPHEN 325 MG/1
650 TABLET ORAL
Status: DISCONTINUED | OUTPATIENT
Start: 2021-02-13 | End: 2021-02-17 | Stop reason: HOSPADM

## 2021-02-13 RX ORDER — MELATONIN
3000 DAILY
Status: DISCONTINUED | OUTPATIENT
Start: 2021-02-14 | End: 2021-02-17 | Stop reason: HOSPADM

## 2021-02-13 RX ORDER — GABAPENTIN 300 MG/1
300 CAPSULE ORAL 3 TIMES DAILY
Status: DISCONTINUED | OUTPATIENT
Start: 2021-02-13 | End: 2021-02-15

## 2021-02-13 RX ORDER — DEXTROSE 50 % IN WATER (D50W) INTRAVENOUS SYRINGE
25-50 AS NEEDED
Status: DISCONTINUED | OUTPATIENT
Start: 2021-02-13 | End: 2021-02-17 | Stop reason: HOSPADM

## 2021-02-13 RX ORDER — ENOXAPARIN SODIUM 100 MG/ML
1 INJECTION SUBCUTANEOUS EVERY 24 HOURS
Status: DISCONTINUED | OUTPATIENT
Start: 2021-02-14 | End: 2021-02-15

## 2021-02-13 RX ORDER — DEXAMETHASONE SODIUM PHOSPHATE 4 MG/ML
4 INJECTION, SOLUTION INTRA-ARTICULAR; INTRALESIONAL; INTRAMUSCULAR; INTRAVENOUS; SOFT TISSUE ONCE
Status: DISCONTINUED | OUTPATIENT
Start: 2021-02-13 | End: 2021-02-13

## 2021-02-13 RX ORDER — DEXTROSE MONOHYDRATE AND SODIUM CHLORIDE 5; .9 G/100ML; G/100ML
100 INJECTION, SOLUTION INTRAVENOUS CONTINUOUS
Status: DISCONTINUED | OUTPATIENT
Start: 2021-02-13 | End: 2021-02-17 | Stop reason: HOSPADM

## 2021-02-13 RX ORDER — INSULIN LISPRO 100 [IU]/ML
INJECTION, SOLUTION INTRAVENOUS; SUBCUTANEOUS
Status: DISCONTINUED | OUTPATIENT
Start: 2021-02-13 | End: 2021-02-17 | Stop reason: HOSPADM

## 2021-02-13 RX ORDER — ZINC SULFATE 50(220)MG
1 CAPSULE ORAL DAILY
Status: DISCONTINUED | OUTPATIENT
Start: 2021-02-14 | End: 2021-02-17 | Stop reason: HOSPADM

## 2021-02-13 RX ORDER — FAMOTIDINE 20 MG/1
20 TABLET, FILM COATED ORAL 2 TIMES DAILY
Status: DISCONTINUED | OUTPATIENT
Start: 2021-02-13 | End: 2021-02-17 | Stop reason: HOSPADM

## 2021-02-13 RX ORDER — ALBUTEROL SULFATE 90 UG/1
4 AEROSOL, METERED RESPIRATORY (INHALATION)
Status: DISCONTINUED | OUTPATIENT
Start: 2021-02-13 | End: 2021-02-17 | Stop reason: HOSPADM

## 2021-02-13 RX ORDER — ENOXAPARIN SODIUM 100 MG/ML
1 INJECTION SUBCUTANEOUS EVERY 12 HOURS
Status: DISCONTINUED | OUTPATIENT
Start: 2021-02-13 | End: 2021-02-13

## 2021-02-13 RX ORDER — ASPIRIN 81 MG/1
81 TABLET ORAL DAILY
Status: DISCONTINUED | OUTPATIENT
Start: 2021-02-14 | End: 2021-02-17 | Stop reason: HOSPADM

## 2021-02-13 RX ORDER — GUAIFENESIN 600 MG/1
600 TABLET, EXTENDED RELEASE ORAL EVERY 12 HOURS
Status: DISCONTINUED | OUTPATIENT
Start: 2021-02-13 | End: 2021-02-17 | Stop reason: HOSPADM

## 2021-02-13 RX ORDER — PREDNISONE 20 MG/1
40 TABLET ORAL 3 TIMES DAILY
Status: DISCONTINUED | OUTPATIENT
Start: 2021-02-13 | End: 2021-02-14

## 2021-02-13 RX ORDER — SODIUM CHLORIDE 9 MG/ML
100 INJECTION, SOLUTION INTRAVENOUS ONCE
Status: DISCONTINUED | OUTPATIENT
Start: 2021-02-13 | End: 2021-02-13

## 2021-02-13 RX ORDER — MAGNESIUM SULFATE 100 %
4 CRYSTALS MISCELLANEOUS AS NEEDED
Status: DISCONTINUED | OUTPATIENT
Start: 2021-02-13 | End: 2021-02-17 | Stop reason: HOSPADM

## 2021-02-13 RX ADMIN — PREDNISONE 40 MG: 20 TABLET ORAL at 21:34

## 2021-02-13 RX ADMIN — PREDNISONE 40 MG: 20 TABLET ORAL at 18:15

## 2021-02-13 RX ADMIN — INSULIN LISPRO 4 UNITS: 100 INJECTION, SOLUTION INTRAVENOUS; SUBCUTANEOUS at 21:33

## 2021-02-13 RX ADMIN — FAMOTIDINE 20 MG: 20 TABLET ORAL at 19:31

## 2021-02-13 RX ADMIN — INSULIN GLARGINE 60 UNITS: 100 INJECTION, SOLUTION SUBCUTANEOUS at 21:33

## 2021-02-13 RX ADMIN — GABAPENTIN 300 MG: 300 CAPSULE ORAL at 21:34

## 2021-02-13 RX ADMIN — AMOXICILLIN AND CLAVULANATE POTASSIUM 1 TABLET: 875; 125 TABLET, FILM COATED ORAL at 18:15

## 2021-02-13 RX ADMIN — GUAIFENESIN 600 MG: 600 TABLET, EXTENDED RELEASE ORAL at 16:45

## 2021-02-13 RX ADMIN — OXYCODONE HYDROCHLORIDE AND ACETAMINOPHEN 1000 MG: 500 TABLET ORAL at 18:12

## 2021-02-13 RX ADMIN — MONTELUKAST SODIUM 10 MG: 10 TABLET ORAL at 21:34

## 2021-02-13 RX ADMIN — AMLODIPINE BESYLATE 5 MG: 5 TABLET ORAL at 18:15

## 2021-02-13 RX ADMIN — ALBUTEROL SULFATE 4 PUFF: 108 AEROSOL, METERED RESPIRATORY (INHALATION) at 15:53

## 2021-02-13 NOTE — ED NOTES
MD Weinstein aware of pt taking home meds. MD states insulin will be held for now.  PICC line team is to come to place line for intravenous meds to be given pr MD.

## 2021-02-13 NOTE — ED PROVIDER NOTES
EMERGENCY DEPARTMENT HISTORY AND PHYSICAL EXAM      Date: 2/13/2021  Patient Name: Johnathan Bosworth      History of Presenting Illness     No chief complaint on file. History Provided By: Patient    HPI: Johnathan Bosworth, 70 y.o. male with a past medical history significant diabetes, hypertension, renal insufficiency and asthma presents to the ED with cc of increasing weakness. Patient was diagnosed with Covid approximately 1 week ago this is his third visit he notes he has been able to eat for the last 2 days. Electrical power 1 hour off in his home this morning. He denies worsening respiratory symptoms though still has a dry nonproductive cough and dyspnea on exertion. Denies fever chills sputum production    There are no other complaints, changes, or physical findings at this time. PCP: Other, MD Carrie    Current Facility-Administered Medications   Medication Dose Route Frequency Provider Last Rate Last Admin    dexamethasone (DECADRON) 4 mg/mL injection 4 mg  4 mg IntraVENous ONCE Arlyce Boast, MD        dextrose 5% and 0.9% NaCl infusion  100 mL/hr IntraVENous CONTINUOUS Arlyce Boast, MD         Current Outpatient Medications   Medication Sig Dispense Refill    omeprazole (PRILOSEC) 40 mg capsule Take 40 mg by mouth daily.  traMADol (ULTRAM) 50 mg tablet Take 50 mg by mouth every six (6) hours as needed for Pain.  insulin glargine (LANTUS) 100 unit/mL injection 60 Units by SubCUTAneous route nightly.  insulin aspart (NOVOLOG) 100 unit/mL injection 26 Units by SubCUTAneous route Every morning.  gabapentin (NEURONTIN) 300 mg capsule Take 300 mg by mouth three (3) times daily.  cholecalciferol (VITAMIN D3) 1,000 unit cap Take 1,000 Units by mouth daily.  aspirin delayed-release 81 mg tablet Take 81 mg by mouth daily.  methadone (DOLOPHINE) 10 mg tablet Take 10 mg by mouth four (4) times daily.       azithromycin (Zithromax TRI-MERCEDEZ) 500 mg tab 1 tab p.o. daily for 5 days 5 Tab 0    predniSONE (DELTASONE) 20 mg tablet Take 40 mg by mouth daily for 5 days. With Breakfast 10 Tab 0    ondansetron (ZOFRAN ODT) 4 mg disintegrating tablet Take 1 Tab by mouth every eight (8) hours as needed for Nausea. 12 Tab 0       Past History     Past Medical History:  Past Medical History:   Diagnosis Date    Asthma     B12 deficiency     CAD (coronary artery disease)     Cataract     Chronic obstructive pulmonary disease (HCC)     CKD (chronic kidney disease)     Depression     Diabetes (HCC)     DJD (degenerative joint disease)     Heart attack ([address]     Hypertension     Long term prescription opiate use     Neuropathy     Rheumatoid arthritis (HCC)     Ulcer        Past Surgical History:  Past Surgical History:   Procedure Laterality Date    HX ANGIOPLASTY      HX HEENT      HX ORTHOPAEDIC      [place] CARDIAC SURG PROCEDURE UNLIST      RCA stent       Family History:  History reviewed. No pertinent family history. Social History:  Social History     Tobacco Use    Smoking status: Former Smoker    Smokeless tobacco: Former User   Substance Use Topics    Alcohol use: No    Drug use: No       Allergies:  No Known Allergies      Review of Systems   Review of all other systems negative    Review of Systems    Physical Exam   Elderly -American male male appears moderately ill no respiratory distress  Physical Exam  Vitals signs and nursing note reviewed. Constitutional:       General: He is not in acute distress. Appearance: Normal appearance. He is obese. He is ill-appearing. He is not toxic-appearing. HENT:      Head: Normocephalic and atraumatic. Mouth/Throat:      Mouth: Mucous membranes are moist.   Eyes:      Extraocular Movements: Extraocular movements intact. Conjunctiva/sclera: Conjunctivae normal.      Pupils: Pupils are equal, round, and reactive to light. Neck:      Musculoskeletal: Normal range of motion and neck supple.  No neck rigidity or muscular tenderness. Vascular: No carotid bruit. Cardiovascular:      Rate and Rhythm: Normal rate and regular rhythm. Pulses: Normal pulses. Heart sounds: Normal heart sounds. Pulmonary:      Effort: Pulmonary effort is normal. No respiratory distress. Breath sounds: Normal breath sounds. No wheezing, rhonchi or rales. Abdominal:      General: Abdomen is flat. There is no distension. Palpations: Abdomen is soft. There is no mass. Tenderness: There is no abdominal tenderness. There is no right CVA tenderness, left CVA tenderness, guarding or rebound. Hernia: No hernia is present. Musculoskeletal: Normal range of motion. General: No tenderness. Right lower leg: No edema. Left lower leg: No edema. Comments: No lower extremity findings for DVT   Skin:     General: Skin is warm and dry. Neurological:      General: No focal deficit present. Mental Status: He is alert and oriented to person, place, and time. Mental status is at baseline. Psychiatric:         Mood and Affect: Mood normal.         Behavior: Behavior normal.         Thought Content: Thought content normal.         Lab and Diagnostic Study Results     Labs -     Recent Results (from the past 12 hour(s))   CBC WITH AUTOMATED DIFF    Collection Time: 02/13/21 11:45 AM   Result Value Ref Range    WBC 7.5 4.4 - 11.3 K/uL    RBC 3.64 (L) 4.50 - 5.90 M/uL    HGB 11.6 (L) 13.5 - 17.5 g/dL    HCT 35.0 (L) 41 - 53 %    MCV 96.1 80 - 100 FL    MCH 31.8 31 - 34 PG    MCHC 33.1 31.0 - 36.0 g/dL    RDW 15.5 (H) 11.5 - 14.5 %    PLATELET 232 K/uL    MPV 8.2 6.5 - 11.5 FL    NRBC 0.0  WBC    ABSOLUTE NRBC 0.00 K/uL    NEUTROPHILS 78 (H) 42 - 75 %    LYMPHOCYTES 11 (L) 20.5 - 51.1 %    MONOCYTES 11 (H) 1.7 - 9.3 %    EOSINOPHILS 0 (L) 0.9 - 2.9 %    BASOPHILS 0 0.0 - 2.5 %    ABS. NEUTROPHILS 5.8 1.8 - 7.7 K/UL    ABS. LYMPHOCYTES 0.8 (L) 1.0 - 4.8 K/UL    ABS.  MONOCYTES 0.8 0.2 - 2.4 K/UL ABS. EOSINOPHILS 0.0 0.0 - 0.7 K/UL    ABS. BASOPHILS 0.0 0.0 - 0.2 K/UL   METABOLIC PANEL, BASIC    Collection Time: 02/13/21 11:45 AM   Result Value Ref Range    Sodium 143 136 - 145 mmol/L    Potassium 3.6 3.5 - 5.1 mmol/L    Chloride 108 97 - 108 mmol/L    CO2 24 21 - 32 mmol/L    Anion gap 11 5 - 15 mmol/L    Glucose 63 (L) 65 - 100 mg/dL    BUN 55 (H) 6 - 20 mg/dL    Creatinine 2.63 (H) 0.70 - 1.30 mg/dL    BUN/Creatinine ratio 21 (H) 12 - 20      GFR est AA 29 (L) >60 ml/min/1.73m2    GFR est non-AA 24 (L) >60 ml/min/1.73m2    Calcium 8.8 8.5 - 10.1 mg/dL   PROTHROMBIN TIME + INR    Collection Time: 02/13/21 12:00 PM   Result Value Ref Range    Prothrombin time 9.7 9.0 - 11.1 sec    INR 1.0 0.9 - 1.1     D DIMER    Collection Time: 02/13/21 12:00 PM   Result Value Ref Range    D-dimer 4.72 (H) 0.19 - 0.50 mg/L FEU   GLUCOSE, POC    Collection Time: 02/13/21 12:25 PM   Result Value Ref Range    Glucose (POC) 62 (L) 65 - 100 mg/dL    Performed by [facility]        Radiologic Studies -   [unfilled]  CT Results  (Last 48 hours)    None        CXR Results  (Last 48 hours)               02/13/21 1116  XR CHEST PORT Final result    Impression:  The lungs appear hypoinflated compared with previous, with diffuse   worsening of bilateral multicentric airspace disease, consistent with worsening   pneumonia. Asymmetric elevation of right hemidiaphragm. No definite pneumothorax   or pleural effusion. Cardiopericardial enlargement. Arterial calcification. Narrative:  Portable chest, 1103 hours, compared with 2/9/2021. Medical Decision Making and ED Course   - I am the first and primary provider for this patient AND AM THE PRIMARY PROVIDER OF RECORD. - I reviewed the vital signs, available nursing notes, past medical history, past surgical history, family history and social history. - Initial assessment performed.  The patients presenting problems have been discussed, and the staff are in agreement with the care plan formulated and outlined with them. I have encouraged them to ask questions as they arise throughout their visit. Vital Signs-Reviewed the patient's vital signs. Patient Vitals for the past 12 hrs:   Temp Pulse Resp BP SpO2   02/13/21 1036 97.8 °F (36.6 °C) 74 20 (!) 153/92 97 %         Records Reviewed: Nursing Notes and Old Medical Records    The patient presents with weakness and debility -known Covid patient with a differential diagnosis of pneumonia worsening Covid dehydration electrolyte abnormality    ED Course:              Provider Notes (Medical Decision Making):   79-year-old male returns the ED complaining of weakness and inability to eat primary reason for visit though Luis Fernando Talbert often is home 2 recent visits in last week for COVID-19 chest x-ray today shows worsening bilateral pulmonary infiltrates discussed with hospitalist will give dexamethasone IV fluids plan on admission  MDM           Consultations:       Consultations: -  Hospitalist Consultant: Dr. Rosemary Keith: We have asked for emergent assistance with regard to this patient. We have discussed the patients HPI, ROS, PE and results this far. They will come and evaluate the patient for admission. Procedures and Critical Care       Performed by: Pacheco Cassidy MD  PROCEDURES: None  Procedures                 CRITICAL CARE NOTE :  1:25 PM  Amount of Critical Care Time: 40(minutes)    IMPENDING DETERIORATION -Respiratory, Cardiovascular and Metabolic  ASSOCIATED RISK FACTORS - Hypotension, Shock and Hypoxia  MANAGEMENT- Bedside Assessment  INTERPRETATION -  Xrays  INTERVENTIONS - hemodynamic mngmt and Metobolic interventions  CASE REVIEW - Hospitalist/Intensivist  TREATMENT RESPONSE -Stable and Unchanged   PERFORMED BY - Self    NOTES   :  I have spent critical care time involved in lab review, consultations with specialist, family decision- making, bedside attention and documentation.  This time excludes time spent in any separate billed procedures. During this entire length of time I was immediately available to the patient . Brinda Patel MD        Disposition     Disposition: Condition unchanged and fair          Remove if not discharged  DISCHARGE PLAN:  1. Current Discharge Medication List      CONTINUE these medications which have NOT CHANGED    Details   omeprazole (PRILOSEC) 40 mg capsule Take 40 mg by mouth daily. traMADol (ULTRAM) 50 mg tablet Take 50 mg by mouth every six (6) hours as needed for Pain. insulin glargine (LANTUS) 100 unit/mL injection 60 Units by SubCUTAneous route nightly. insulin aspart (NOVOLOG) 100 unit/mL injection 26 Units by SubCUTAneous route Every morning.      gabapentin (NEURONTIN) 300 mg capsule Take 300 mg by mouth three (3) times daily. cholecalciferol (VITAMIN D3) 1,000 unit cap Take 1,000 Units by mouth daily. aspirin delayed-release 81 mg tablet Take 81 mg by mouth daily. methadone (DOLOPHINE) 10 mg tablet Take 10 mg by mouth four (4) times daily. azithromycin (Zithromax TRI-MERCEDEZ) 500 mg tab 1 tab p.o. daily for 5 days  Qty: 5 Tab, Refills: 0      predniSONE (DELTASONE) 20 mg tablet Take 40 mg by mouth daily for 5 days. With Breakfast  Qty: 10 Tab, Refills: 0      ondansetron (ZOFRAN ODT) 4 mg disintegrating tablet Take 1 Tab by mouth every eight (8) hours as needed for Nausea. Qty: 12 Tab, Refills: 0           2. Follow-up Information    None       3. Return to ED if worse   4. Current Discharge Medication List          Diagnosis     Clinical Impression: COVID-19 moderate to severe pulmonary involvement ; dehydration ; hypoglycemia attestations:    Brinda Patel MD    Please note that this dictation was completed with i[employer], the [employer] voice recognition software. Quite often unanticipated grammatical, syntax, homophones, and other interpretive errors are inadvertently transcribed by the computer software.   Please disregard these errors. Please excuse any errors that have escaped final proofreading. Thank you.

## 2021-02-13 NOTE — H&P
History and Physical      Chief Complaints:   Sob       Subjective:     Mary Maradiaga is a 70 y.o. male followed by Dr Tanya Meckel in [place] and  has a past medical history of Asthma, B12 deficiency, CAD (coronary artery disease), Cataract, Chronic obstructive pulmonary disease ([address] CKD (chronic kidney disease), Depression, Diabetes ([address] DJD (degenerative joint disease), Heart attack ([address] Hypertension, Long term prescription opiate use, Neuropathy, Rheumatoid arthritis ([address] and Ulcer. And chronic pain on noted methadone comes in once again for sob. Patient was in ER on 2/9 and at that time the CXR showed pna and was sent on Azithromycin and prednisone. COVID testing at that time came back as positive and today patient states he had woorsening sob and nonproductive cough. Denies any fever and no noted fever or hypoxia on arrival but patient is noted on exam to be visibile sob. Patient has hx of tobacco use of 1/2 ppd for which he states he quit one month ago. No diagonosis of copd but states he has inhaler at home. On evalution patient has noted ckd with creat noted of 2 in 2016. D dimer was elevated at 4.72 so lovenox 1mg /kg sq daily dosing cxr shows hyperinflation with diffuse worsening of bilateral multicentric airspace disease.   Because of elevated D-dimer and worsening chest x-ray patient has increased risk of deteriorating symptoms so admit the patient to acute care for worsening pneumonia secondary to Covid and COPD exacerbation      Past Medical History:   Diagnosis Date    Asthma     B12 deficiency     CAD (coronary artery disease)     Cataract     Chronic obstructive pulmonary disease (HCC)     CKD (chronic kidney disease)     Depression     Diabetes ([address]     DJD (degenerative joint disease)     Heart attack ([address]     Hypertension     Long term prescription opiate use     Neuropathy     Rheumatoid arthritis (HCC)     Ulcer       Past Surgical History:   Procedure Laterality Date  HX ANGIOPLASTY      HX HEENT      HX ORTHOPAEDIC      MD CARDIAC SURG PROCEDURE UNLIST      RCA stent     Family History   Problem Relation Age of Onset    Diabetes Mother     Asthma Mother     Diabetes Father       Social History     Tobacco Use    Smoking status: Former Smoker    Smokeless tobacco: Former User   Substance Use Topics    Alcohol use: No       Prior to Admission medications    Medication Sig Start Date End Date Taking? Authorizing Provider   omeprazole (PRILOSEC) 40 mg capsule Take 40 mg by mouth daily. Yes Landen, MD Carrie   traMADol (ULTRAM) 50 mg tablet Take 50 mg by mouth every six (6) hours as needed for Pain. Yes Landen, MD Carrie   insulin glargine (LANTUS) 100 unit/mL injection 60 Units by SubCUTAneous route nightly. Yes Carrie Schuster MD   insulin aspart (NOVOLOG) 100 unit/mL injection 26 Units by SubCUTAneous route Every morning. Yes Carrie Schuster MD   gabapentin (NEURONTIN) 300 mg capsule Take 300 mg by mouth three (3) times daily. Yes Carrie Schuster MD   cholecalciferol (VITAMIN D3) 1,000 unit cap Take 1,000 Units by mouth daily. Yes Landen, MD Carrie   aspirin delayed-release 81 mg tablet Take 81 mg by mouth daily. Yes Landen, MD Carrie   methadone (DOLOPHINE) 10 mg tablet Take 10 mg by mouth four (4) times daily. Yes Carrie Schuster MD   azithromycin (Zithromax TRI-MERCEDEZ) 500 mg tab 1 tab p.o. daily for 5 days 2/9/21   Hetal Mcdermott MD   predniSONE (DELTASONE) 20 mg tablet Take 40 mg by mouth daily for 5 days. With Breakfast 2/9/21 2/14/21  Hetal Mcdermott MD   ondansetron (ZOFRAN ODT) 4 mg disintegrating tablet Take 1 Tab by mouth every eight (8) hours as needed for Nausea. 7/11/16   Cleveland Apley, PA-C     No Known Allergies     Review of Systems:  Review of Systems   Constitutional: Negative for chills, diaphoresis, fatigue and fever. HENT: Negative for congestion, ear pain, postnasal drip, sinus pain and sore throat.     Eyes: Negative for pain, discharge and redness. Respiratory: Positive for cough and shortness of breath. Negative for wheezing. Cardiovascular: Negative for chest pain and palpitations. Gastrointestinal: Negative for abdominal pain, constipation, diarrhea, nausea and vomiting. Genitourinary: Negative for dysuria, flank pain, frequency and urgency. Musculoskeletal: Negative for arthralgias and myalgias. Neurological: Negative for dizziness, weakness and headaches. Psychiatric/Behavioral: Negative for agitation and hallucinations. The patient is not nervous/anxious. Objective:     Vitals:  Visit Vitals  BP (!) 146/87   Pulse 81   Temp 97.8 °F (36.6 °C)   Resp 20   Ht 5' 8\" (1.727 m)   Wt 90.7 kg (200 lb)   SpO2 92%   BMI 30.41 kg/m²       Physical Exam:  General: Alert, cooperative, no distress. Head:  Normocephalic, without obvious abnormality, atraumatic. Eyes:  Conjunctivae/corneas clear. Pupils equal, round, reactive to light. Extraocular movements intact. Lungs: Bilateral air entry diminished no wheezes noted  Chest wall: No tenderness or deformity. Heart:  Regular rate and rhythm, S1, S2 normal, no murmur, click, rub, or gallop. Abdomen:   Soft, non-tender. Bowel sounds normal. No masses. No organomegaly. Back:  No spine tenderness to palpation  Extremities: Extremities normal, atraumatic, no cyanosis or edema. Pulses: Symmetric all extremities. Skin: Skin color, texture, turgor normal.   Lymph nodes: Cervical nodes normal.  Neurologic: CNII-XII intact. Normal strength, sensation, and reflexes throughout.       Labs:  Recent Results (from the past 24 hour(s))   CBC WITH AUTOMATED DIFF    Collection Time: 02/13/21 11:45 AM   Result Value Ref Range    WBC 7.5 4.4 - 11.3 K/uL    RBC 3.64 (L) 4.50 - 5.90 M/uL    HGB 11.6 (L) 13.5 - 17.5 g/dL    HCT 35.0 (L) 41 - 53 %    MCV 96.1 80 - 100 FL    MCH 31.8 31 - 34 PG    MCHC 33.1 31.0 - 36.0 g/dL    RDW 15.5 (H) 11.5 - 14.5 %    PLATELET 983 K/uL    MPV 8.2 6.5 - 11.5 FL    NRBC 0.0  WBC    ABSOLUTE NRBC 0.00 K/uL    NEUTROPHILS 78 (H) 42 - 75 %    LYMPHOCYTES 11 (L) 20.5 - 51.1 %    MONOCYTES 11 (H) 1.7 - 9.3 %    EOSINOPHILS 0 (L) 0.9 - 2.9 %    BASOPHILS 0 0.0 - 2.5 %    ABS. NEUTROPHILS 5.8 1.8 - 7.7 K/UL    ABS. LYMPHOCYTES 0.8 (L) 1.0 - 4.8 K/UL    ABS. MONOCYTES 0.8 0.2 - 2.4 K/UL    ABS. EOSINOPHILS 0.0 0.0 - 0.7 K/UL    ABS. BASOPHILS 0.0 0.0 - 0.2 K/UL   METABOLIC PANEL, BASIC    Collection Time: 02/13/21 11:45 AM   Result Value Ref Range    Sodium 143 136 - 145 mmol/L    Potassium 3.6 3.5 - 5.1 mmol/L    Chloride 108 97 - 108 mmol/L    CO2 24 21 - 32 mmol/L    Anion gap 11 5 - 15 mmol/L    Glucose 63 (L) 65 - 100 mg/dL    BUN 55 (H) 6 - 20 mg/dL    Creatinine 2.63 (H) 0.70 - 1.30 mg/dL    BUN/Creatinine ratio 21 (H) 12 - 20      GFR est AA 29 (L) >60 ml/min/1.73m2    GFR est non-AA 24 (L) >60 ml/min/1.73m2    Calcium 8.8 8.5 - 10.1 mg/dL   MAGNESIUM    Collection Time: 02/13/21 11:45 AM   Result Value Ref Range    Magnesium 2.1 1.6 - 2.4 mg/dL   PROTHROMBIN TIME + INR    Collection Time: 02/13/21 12:00 PM   Result Value Ref Range    Prothrombin time 9.7 9.0 - 11.1 sec    INR 1.0 0.9 - 1.1     D DIMER    Collection Time: 02/13/21 12:00 PM   Result Value Ref Range    D-dimer 4.72 (H) 0.19 - 0.50 mg/L FEU   GLUCOSE, POC    Collection Time: 02/13/21 12:25 PM   Result Value Ref Range    Glucose (POC) 62 (L) 65 - 100 mg/dL    Performed by Bertha Silva    GLUCOSE, POC    Collection Time: 02/13/21  4:53 PM   Result Value Ref Range    Glucose (POC) 273 (H) 65 - 100 mg/dL    Performed by Ad Summos        Imaging:  Xr Chest Port    Result Date: 2/13/2021  The lungs appear hypoinflated compared with previous, with diffuse worsening of bilateral multicentric airspace disease, consistent with worsening pneumonia. Asymmetric elevation of right hemidiaphragm. No definite pneumothorax or pleural effusion. Cardiopericardial enlargement. Arterial calcification. Assessment & Plan:     COVID-19 PNA  -Was noted on 2/9 to have positive PCR testing for Covid and chest x-ray shows bilateral multicentric airspace disease worsening  -No hypoxia noted  -Start albuterol inhaler 4 puffs 4 times daily  -Encourage incentive spirometry every 2 hours while awake  -Start ascorbic acid, zinc, vitamin D  - start steroids initially with prednisone 40 mg oral every 8 hours and changed to IV dosing once IV line  -Zosyn 3.375 mg IV every 8 hours to start once IV line is placed until then start Augmentin 875 mg / 125 mg oral twice daily  -With no hypoxia we will not start remdesivir  -We will talk with patient about ivermectin dosing  -Obtain sputum culture    COPD exacerbation  -Has fairly extensive history of tobacco use of 1/2 pack/day for unknown duration but did state he quit 1 month back  -Has decreased breath sounds and wheeze noted on auscultation  -Start albuterol 4 puffs 4 times daily  -Start prednisone 40 mg oral every 8 hours until IV line is placed  -Montelukast 10 mg daily  -Mucinex twice daily  -Sputum culture    Elevated D-dimer  -Was 4.72 on 2/13  -Started on Lovenox 1 mg/kg daily dosing secondary to CKD  -Monitor D-dimer trends  -Unable to do CTA secondary to CKD as well as unable to do V/Q secondary to COVID-19 diagnosis      Diabetes mellitus on insulin  -Patient states he takes 26 units of regular insulin in the a.m. and 60 units of Lantus at night  -Obtain A1c  -Follow with regular ¢ scale with Accu-Cheks AC at bedtime  -Patient had his own Lantus and took his Lantus with dinner and nurse instructed patient he is not to take his own medications and will reorder his Lantus dosing for tomorrow night hold on ordering his regular insulin dosing at 26 units in the a.m.  -Nutrition consult on Monday    BIRD on CKD  -Last baseline creatinine of 2 patient does not appear to follow with any nephrologist  -Obtain nephrology consultation on Monday  -Once IV line placed start patient on half NS with 20 meq KCl at 75 an hour  -Renal ultrasound  -Urinalysis with protein creatinine ratio and urine lites  -Monitor daily renal function    Chronic pain   -States he has chronic pain from rheumatoid arthritis but does not appear to follow a rheumatologist only PCP  -Methadone 10 mg oral 4 times daily noted on medication list as well as tramadol noted and will hold until confirmed  -Tramadol contraindicated with patient's CKD    Diabetic neuropathy  -Restart patient's gabapentin 300 mg oral 3 times daily    Hypertension  -No noted medications for blood pressure  -On admission blood pressure 153/92  -Start Norvasc 5 mg oral x1 now and daily  -Monitor every 4 hours    GI prophylaxis  -We will restart patient's home omeprazole 40 mg daily    DVT prophylaxis  Lovenox 1 mg/kg subcu daily    CODE STATUS: Patient wishes to be a DNR/DNI  Patient designates his daughter as his medical decision-maker if for some reason he is unable to make decision for himself    Spent 45 minutes admitting patient to acute care and expect at least a 2 midnight acute hospitalization for treatment of current diagnoses      electronically signed by Vivian Maier MD on 2/13/2021 at 5:56 PM

## 2021-02-13 NOTE — ED NOTES
PCU unit did not contact PICC line team regarding pts need. This RN reached team at [phone]. Team aware of pts need and states they may be unable to place until Monday morning. Will contact Bhanu Burrows MD regarding situation.

## 2021-02-13 NOTE — ED NOTES
Pt placed in to ospital bed for comfort. Pt also states he took insulin while  Nurse was out of room. Malachi Brown MD regarding matter. This RN advised pt not to take any home medications while in facility.

## 2021-02-14 LAB
ANION GAP SERPL CALC-SCNC: 11 MMOL/L (ref 5–15)
BASOPHILS # BLD: 0 K/UL (ref 0–0.2)
BASOPHILS NFR BLD: 0 % (ref 0–2.5)
BUN SERPL-MCNC: 49 MG/DL (ref 6–20)
BUN/CREAT SERPL: 21 (ref 12–20)
CA-I BLD-MCNC: 8.6 MG/DL (ref 8.5–10.1)
CHLORIDE SERPL-SCNC: 107 MMOL/L (ref 97–108)
CO2 SERPL-SCNC: 22 MMOL/L (ref 21–32)
CREAT SERPL-MCNC: 2.36 MG/DL (ref 0.7–1.3)
CRP SERPL-MCNC: 3.75 MG/DL (ref 0–0.6)
CRP SERPL-MCNC: 6.71 MG/DL (ref 0–0.6)
D DIMER PPP FEU-MCNC: 9.3 MG/L FEU (ref 0.19–0.5)
EOSINOPHIL # BLD: 0 K/UL (ref 0–0.7)
EOSINOPHIL NFR BLD: 0 % (ref 0.9–2.9)
ERYTHROCYTE [DISTWIDTH] IN BLOOD BY AUTOMATED COUNT: 15.7 % (ref 11.5–14.5)
EST. AVERAGE GLUCOSE BLD GHB EST-MCNC: 229 MG/DL
GLUCOSE BLD STRIP.AUTO-MCNC: 165 MG/DL (ref 65–100)
GLUCOSE BLD STRIP.AUTO-MCNC: 181 MG/DL (ref 65–100)
GLUCOSE BLD STRIP.AUTO-MCNC: 265 MG/DL (ref 65–100)
GLUCOSE BLD STRIP.AUTO-MCNC: 294 MG/DL (ref 65–100)
GLUCOSE BLD STRIP.AUTO-MCNC: 306 MG/DL (ref 65–100)
GLUCOSE SERPL-MCNC: 173 MG/DL (ref 65–100)
HBA1C MFR BLD: 9.6 % (ref 4–5.6)
HCT VFR BLD AUTO: 33.9 % (ref 41–53)
HGB BLD-MCNC: 11 G/DL (ref 13.5–17.5)
LYMPHOCYTES # BLD: 0.5 K/UL (ref 1–4.8)
LYMPHOCYTES NFR BLD: 8 % (ref 20.5–51.1)
MCH RBC QN AUTO: 31.2 PG (ref 31–34)
MCHC RBC AUTO-ENTMCNC: 32.5 G/DL (ref 31–36)
MCV RBC AUTO: 96.2 FL (ref 80–100)
MONOCYTES # BLD: 0.3 K/UL (ref 0.2–2.4)
MONOCYTES NFR BLD: 5 % (ref 1.7–9.3)
NEUTS SEG # BLD: 4.8 K/UL (ref 1.8–7.7)
NEUTS SEG NFR BLD: 87 % (ref 42–75)
NRBC # BLD: 0 K/UL
NRBC BLD-RTO: 0.1 PER 100 WBC
PERFORMED BY, TECHID: ABNORMAL
PLATELET # BLD AUTO: 391 K/UL
PMV BLD AUTO: 8.2 FL (ref 6.5–11.5)
POTASSIUM SERPL-SCNC: 4.7 MMOL/L (ref 3.5–5.1)
PROCALCITONIN SERPL-MCNC: 0.12 NG/ML
RBC # BLD AUTO: 3.52 M/UL (ref 4.5–5.9)
SODIUM SERPL-SCNC: 140 MMOL/L (ref 136–145)
WBC # BLD AUTO: 5.5 K/UL (ref 4.4–11.3)

## 2021-02-14 PROCEDURE — 80048 BASIC METABOLIC PNL TOTAL CA: CPT

## 2021-02-14 PROCEDURE — 65270000029 HC RM PRIVATE

## 2021-02-14 PROCEDURE — 86140 C-REACTIVE PROTEIN: CPT

## 2021-02-14 PROCEDURE — 74011250636 HC RX REV CODE- 250/636: Performed by: HOSPITALIST

## 2021-02-14 PROCEDURE — 85025 COMPLETE CBC W/AUTO DIFF WBC: CPT

## 2021-02-14 PROCEDURE — 36415 COLL VENOUS BLD VENIPUNCTURE: CPT

## 2021-02-14 PROCEDURE — 82962 GLUCOSE BLOOD TEST: CPT

## 2021-02-14 PROCEDURE — 74011000258 HC RX REV CODE- 258: Performed by: HOSPITALIST

## 2021-02-14 PROCEDURE — 74011250637 HC RX REV CODE- 250/637: Performed by: HOSPITALIST

## 2021-02-14 PROCEDURE — 94640 AIRWAY INHALATION TREATMENT: CPT

## 2021-02-14 PROCEDURE — 85379 FIBRIN DEGRADATION QUANT: CPT

## 2021-02-14 PROCEDURE — 74011636637 HC RX REV CODE- 636/637: Performed by: HOSPITALIST

## 2021-02-14 RX ORDER — PREDNISONE 20 MG/1
40 TABLET ORAL EVERY 6 HOURS
Status: DISCONTINUED | OUTPATIENT
Start: 2021-02-14 | End: 2021-02-14

## 2021-02-14 RX ORDER — BUDESONIDE AND FORMOTEROL FUMARATE DIHYDRATE 160; 4.5 UG/1; UG/1
2 AEROSOL RESPIRATORY (INHALATION)
Status: DISCONTINUED | OUTPATIENT
Start: 2021-02-14 | End: 2021-02-17 | Stop reason: HOSPADM

## 2021-02-14 RX ADMIN — OXYCODONE HYDROCHLORIDE AND ACETAMINOPHEN 1000 MG: 500 TABLET ORAL at 07:50

## 2021-02-14 RX ADMIN — INSULIN LISPRO 2 UNITS: 100 INJECTION, SOLUTION INTRAVENOUS; SUBCUTANEOUS at 08:23

## 2021-02-14 RX ADMIN — GABAPENTIN 300 MG: 300 CAPSULE ORAL at 21:32

## 2021-02-14 RX ADMIN — BUDESONIDE AND FORMOTEROL FUMARATE DIHYDRATE 2 PUFF: 160; 4.5 AEROSOL RESPIRATORY (INHALATION) at 09:34

## 2021-02-14 RX ADMIN — ASPIRIN 81 MG: 81 TABLET, COATED ORAL at 07:50

## 2021-02-14 RX ADMIN — ALBUTEROL SULFATE 4 PUFF: 108 AEROSOL, METERED RESPIRATORY (INHALATION) at 07:16

## 2021-02-14 RX ADMIN — FAMOTIDINE 20 MG: 20 TABLET ORAL at 18:11

## 2021-02-14 RX ADMIN — MONTELUKAST SODIUM 10 MG: 10 TABLET ORAL at 21:33

## 2021-02-14 RX ADMIN — INSULIN GLARGINE 60 UNITS: 100 INJECTION, SOLUTION SUBCUTANEOUS at 21:33

## 2021-02-14 RX ADMIN — INSULIN LISPRO 5 UNITS: 100 INJECTION, SOLUTION INTRAVENOUS; SUBCUTANEOUS at 11:30

## 2021-02-14 RX ADMIN — INSULIN LISPRO 7 UNITS: 100 INJECTION, SOLUTION INTRAVENOUS; SUBCUTANEOUS at 18:11

## 2021-02-14 RX ADMIN — ZINC SULFATE 220 MG (50 MG) CAPSULE 1 CAPSULE: CAPSULE at 07:50

## 2021-02-14 RX ADMIN — ENOXAPARIN SODIUM 90 MG: 100 INJECTION SUBCUTANEOUS at 13:38

## 2021-02-14 RX ADMIN — GUAIFENESIN 600 MG: 600 TABLET, EXTENDED RELEASE ORAL at 20:31

## 2021-02-14 RX ADMIN — Medication 10 ML: at 21:35

## 2021-02-14 RX ADMIN — AMLODIPINE BESYLATE 5 MG: 5 TABLET ORAL at 09:00

## 2021-02-14 RX ADMIN — AMOXICILLIN AND CLAVULANATE POTASSIUM 1 TABLET: 875; 125 TABLET, FILM COATED ORAL at 07:49

## 2021-02-14 RX ADMIN — FAMOTIDINE 20 MG: 20 TABLET ORAL at 09:00

## 2021-02-14 RX ADMIN — PIPERACILLIN AND TAZOBACTAM 3.38 G: 3; .375 INJECTION, POWDER, LYOPHILIZED, FOR SOLUTION INTRAVENOUS at 21:34

## 2021-02-14 RX ADMIN — INSULIN LISPRO 3 UNITS: 100 INJECTION, SOLUTION INTRAVENOUS; SUBCUTANEOUS at 21:33

## 2021-02-14 RX ADMIN — ALBUTEROL SULFATE 4 PUFF: 108 AEROSOL, METERED RESPIRATORY (INHALATION) at 15:11

## 2021-02-14 RX ADMIN — ALBUTEROL SULFATE 4 PUFF: 108 AEROSOL, METERED RESPIRATORY (INHALATION) at 00:05

## 2021-02-14 RX ADMIN — Medication 3000 UNITS: at 07:50

## 2021-02-14 RX ADMIN — PREDNISONE 40 MG: 20 TABLET ORAL at 07:50

## 2021-02-14 RX ADMIN — GABAPENTIN 300 MG: 300 CAPSULE ORAL at 07:50

## 2021-02-14 RX ADMIN — OXYCODONE HYDROCHLORIDE AND ACETAMINOPHEN 1000 MG: 500 TABLET ORAL at 18:11

## 2021-02-14 RX ADMIN — AMOXICILLIN AND CLAVULANATE POTASSIUM 1 TABLET: 875; 125 TABLET, FILM COATED ORAL at 18:11

## 2021-02-14 RX ADMIN — ALBUTEROL SULFATE 4 PUFF: 108 AEROSOL, METERED RESPIRATORY (INHALATION) at 19:20

## 2021-02-14 RX ADMIN — BUDESONIDE AND FORMOTEROL FUMARATE DIHYDRATE 2 PUFF: 160; 4.5 AEROSOL RESPIRATORY (INHALATION) at 19:20

## 2021-02-14 RX ADMIN — PREDNISONE 40 MG: 20 TABLET ORAL at 12:53

## 2021-02-14 RX ADMIN — GUAIFENESIN 600 MG: 600 TABLET, EXTENDED RELEASE ORAL at 07:50

## 2021-02-14 NOTE — ED NOTES
Pt morning meds for 0900 given at 750 while in room with pt. MAR unable to link orders due to 10 minutes prior to allotted hour scheduling times. 0900 orders canceled as to not double dose patient.

## 2021-02-14 NOTE — PROGRESS NOTES
Progress Note  Date:2/14/2021       Room:[facility]/  Patient Noemy Louise     YOB: 1945     Age:75 y.o. Subjective    HPI :  Manny Simms is a 70 y.o. male followed by Dr Ryan Hernandez in [place] and  has a past medical history of Asthma, B12 deficiency, CAD (coronary artery disease), Cataract, Chronic obstructive pulmonary disease ([address] CKD (chronic kidney disease), Depression, Diabetes ([facility] [address] DJD (degenerative joint disease), Heart attack ([facility] [address] Hypertension, Long term prescription opiate use, Neuropathy, Rheumatoid arthritis ([facility] [address] and Ulcer. And chronic pain on noted methadone comes in once again for sob. Patient was in ER on 2/9 and at that time the CXR showed pna and was sent on Azithromycin and prednisone. COVID testing at that time came back as positive and today patient states he had woorsening sob and nonproductive cough. Denies any fever and no noted fever or hypoxia on arrival but patient is noted on exam to be visibile sob. Patient has hx of tobacco use of 1/2 ppd for which he states he quit one month ago. No diagonosis of copd but states he has inhaler at home. On evalution patient has noted ckd with creat noted of 2 in 2016. D dimer was elevated at 4.72 so lovenox 1mg /kg sq daily dosing cxr shows hyperinflation with diffuse worsening of bilateral multicentric airspace disease. Because of elevated D-dimer and worsening chest x-ray patient has increased risk of deteriorating symptoms so admit the patient to acute care for worsening pneumonia secondary to Covid and COPD exacerbation    Patient seen and evaluated resting in bed no acute distress continues to have good O2 saturation on RA at 94 - 98%. Still has wheezing noted but does say he is breathing better. Afebrile, d dimer initially elevated at 4.72 but increased to 9.3 but unable to do CTA secondary to CKD continues on therapeutic renal dose of Lovenox. Denies any chest pain.   Still no IV access and urine nurses are attempting to try once again this morning    Review of Systems   Constitutional: Negative for chills, diaphoresis, fatigue and fever. HENT: Negative for congestion, ear pain, postnasal drip, sinus pain and sore throat. Eyes: Negative for pain, discharge and redness. Respiratory: Positive for shortness of breath and wheezing. Negative for cough. Cardiovascular: Negative for chest pain and palpitations. Gastrointestinal: Negative for abdominal pain, constipation, diarrhea, nausea and vomiting. Genitourinary: Negative for dysuria, flank pain, frequency and urgency. Musculoskeletal: Negative for arthralgias and myalgias. Neurological: Negative for dizziness, weakness and headaches. Psychiatric/Behavioral: Negative for agitation and hallucinations. The patient is not nervous/anxious. Objective           Vitals Last 24 Hours:  Patient Vitals for the past 24 hrs:   Temp Pulse Resp BP SpO2   02/14/21 0745 98.9 °F (37.2 °C) 87 19 (!) 163/93 94 %   02/14/21 0716     98 %   02/14/21 0623 98.6 °F (37 °C) 76 16 (!) 151/82 100 %   02/14/21 0430  80 18 (!) 152/88 96 %   02/14/21 0309  81 17 138/85 97 %   02/14/21 0026 97.9 °F (36.6 °C) 85 16 (!) 165/81 97 %   02/14/21 0005     94 %   02/13/21 1930 97.8 °F (36.6 °C) 75 18 (!) 132/97 97 %   02/13/21 1815  81  (!) 146/87    02/13/21 1800  84 18 (!) 140/86 93 %   02/13/21 1553     92 %   02/13/21 1036 97.8 °F (36.6 °C) 74 20 (!) 153/92 97 %        I/O (24Hr): No intake or output data in the 24 hours ending 02/14/21 1000    Physical Exam:  General: Alert, cooperative, no distress, appears stated age. Head:  Normocephalic, without obvious abnormality, atraumatic. Eyes:  Conjunctivae/corneas clear. Pupils equal, round, reactive to light. Extraocular movements intact. Lungs: Bilateral air entry diminished but wheezing noted  Chest wall: No tenderness or deformity.   Heart:  Regular rate and rhythm, S1, S2 normal, no murmur, click, rub or gallop. Abdomen:  Soft, non-tender. Bowel sounds normal. No masses,  No organomegaly. Extremities: Extremities normal, atraumatic, no cyanosis or edema. Pulses: 2+ and symmetric all extremities. Skin: Skin color, texture, turgor normal. No rashes or lesions  Neurologic: Awake, Alert, oriented.  No obvious gross sensory or motor deficits      Medications           Current Facility-Administered Medications   Medication Dose Route Frequency    budesonide-formoteroL (SYMBICORT) 160-4.5 mcg/actuation HFA inhaler 2 Puff  2 Puff Inhalation BID RT    [Held by provider] dextrose 5% and 0.9% NaCl infusion  100 mL/hr IntraVENous CONTINUOUS    enoxaparin (LOVENOX) injection 90 mg  1 mg/kg SubCUTAneous Q24H    sodium chloride (NS) flush 5-40 mL  5-40 mL IntraVENous Q8H    sodium chloride (NS) flush 5-40 mL  5-40 mL IntraVENous PRN    acetaminophen (TYLENOL) tablet 650 mg  650 mg Oral Q6H PRN    Or    acetaminophen (TYLENOL) suppository 650 mg  650 mg Rectal Q6H PRN    polyethylene glycol (MIRALAX) packet 17 g  17 g Oral DAILY PRN    albuterol (PROVENTIL HFA, VENTOLIN HFA, PROAIR HFA) inhaler 4 Puff  4 Puff Inhalation QID RT    guaiFENesin ER (MUCINEX) tablet 600 mg  600 mg Oral Q12H    ascorbic acid (vitamin C) (VITAMIN C) tablet 1,000 mg  1,000 mg Oral BID    zinc sulfate (ZINCATE) 220 (50) mg capsule 1 Cap  1 Cap Oral DAILY    piperacillin-tazobactam (ZOSYN) 3.375 g in 0.9% sodium chloride (MBP/ADV) 100 mL MBP  3.375 g IntraVENous Q8H    predniSONE (DELTASONE) tablet 40 mg  40 mg Oral TID    amoxicillin-clavulanate (AUGMENTIN) 875-125 mg per tablet 1 Tab  1 Tab Oral BID WITH MEALS    glucose chewable tablet 16 g  4 Tab Oral PRN    dextrose (D50W) injection syrg 12.5-25 g  25-50 mL IntraVENous PRN    glucagon (GLUCAGEN) injection 1 mg  1 mg IntraMUSCular PRN    insulin lispro (HUMALOG) injection   SubCUTAneous AC&HS    cholecalciferol (VITAMIN D3) (1000 Units /25 mcg) tablet 3,000 Units  3,000 Units Oral DAILY    aspirin delayed-release tablet 81 mg  81 mg Oral DAILY    gabapentin (NEURONTIN) capsule 300 mg  300 mg Oral TID    insulin glargine (LANTUS) injection 60 Units  60 Units SubCUTAneous QHS    montelukast (SINGULAIR) tablet 10 mg  10 mg Oral QHS    amLODIPine (NORVASC) tablet 5 mg  5 mg Oral DAILY    famotidine (PEPCID) tablet 20 mg  20 mg Oral BID     Current Outpatient Medications   Medication Sig    omeprazole (PRILOSEC) 40 mg capsule Take 40 mg by mouth daily.  traMADol (ULTRAM) 50 mg tablet Take 50 mg by mouth every six (6) hours as needed for Pain.  insulin glargine (LANTUS) 100 unit/mL injection 60 Units by SubCUTAneous route nightly.  insulin aspart (NOVOLOG) 100 unit/mL injection 26 Units by SubCUTAneous route Every morning.  gabapentin (NEURONTIN) 300 mg capsule Take 300 mg by mouth three (3) times daily.  cholecalciferol (VITAMIN D3) 1,000 unit cap Take 1,000 Units by mouth daily.  aspirin delayed-release 81 mg tablet Take 81 mg by mouth daily.  methadone (DOLOPHINE) 10 mg tablet Take 10 mg by mouth four (4) times daily.  azithromycin (Zithromax TRI-MERCEDEZ) 500 mg tab 1 tab p.o. daily for 5 days    predniSONE (DELTASONE) 20 mg tablet Take 40 mg by mouth daily for 5 days. With Breakfast    ondansetron (ZOFRAN ODT) 4 mg disintegrating tablet Take 1 Tab by mouth every eight (8) hours as needed for Nausea. Allergies         Patient has no known allergies.        Labs/Imaging/Diagnostics      Labs:  Recent Results (from the past 48 hour(s))   CBC WITH AUTOMATED DIFF    Collection Time: 02/13/21 11:45 AM   Result Value Ref Range    WBC 7.5 4.4 - 11.3 K/uL    RBC 3.64 (L) 4.50 - 5.90 M/uL    HGB 11.6 (L) 13.5 - 17.5 g/dL    HCT 35.0 (L) 41 - 53 %    MCV 96.1 80 - 100 FL    MCH 31.8 31 - 34 PG    MCHC 33.1 31.0 - 36.0 g/dL    RDW 15.5 (H) 11.5 - 14.5 %    PLATELET 734 K/uL    MPV 8.2 6.5 - 11.5 FL    NRBC 0.0  WBC    ABSOLUTE NRBC 0.00 K/uL    NEUTROPHILS 78 (H) 42 - 75 %    LYMPHOCYTES 11 (L) 20.5 - 51.1 %    MONOCYTES 11 (H) 1.7 - 9.3 %    EOSINOPHILS 0 (L) 0.9 - 2.9 %    BASOPHILS 0 0.0 - 2.5 %    ABS. NEUTROPHILS 5.8 1.8 - 7.7 K/UL    ABS. LYMPHOCYTES 0.8 (L) 1.0 - 4.8 K/UL    ABS. MONOCYTES 0.8 0.2 - 2.4 K/UL    ABS. EOSINOPHILS 0.0 0.0 - 0.7 K/UL    ABS.  BASOPHILS 0.0 0.0 - 0.2 K/UL   METABOLIC PANEL, BASIC    Collection Time: 02/13/21 11:45 AM   Result Value Ref Range    Sodium 143 136 - 145 mmol/L    Potassium 3.6 3.5 - 5.1 mmol/L    Chloride 108 97 - 108 mmol/L    CO2 24 21 - 32 mmol/L    Anion gap 11 5 - 15 mmol/L    Glucose 63 (L) 65 - 100 mg/dL    BUN 55 (H) 6 - 20 mg/dL    Creatinine 2.63 (H) 0.70 - 1.30 mg/dL    BUN/Creatinine ratio 21 (H) 12 - 20      GFR est AA 29 (L) >60 ml/min/1.73m2    GFR est non-AA 24 (L) >60 ml/min/1.73m2    Calcium 8.8 8.5 - 10.1 mg/dL   MAGNESIUM    Collection Time: 02/13/21 11:45 AM   Result Value Ref Range    Magnesium 2.1 1.6 - 2.4 mg/dL   PROTHROMBIN TIME + INR    Collection Time: 02/13/21 12:00 PM   Result Value Ref Range    Prothrombin time 9.7 9.0 - 11.1 sec    INR 1.0 0.9 - 1.1     D DIMER    Collection Time: 02/13/21 12:00 PM   Result Value Ref Range    D-dimer 4.72 (H) 0.19 - 0.50 mg/L FEU   GLUCOSE, POC    Collection Time: 02/13/21 12:25 PM   Result Value Ref Range    Glucose (POC) 62 (L) 65 - 100 mg/dL    Performed by Naldo    GLUCOSE, POC    Collection Time: 02/13/21  4:53 PM   Result Value Ref Range    Glucose (POC) 273 (H) 65 - 100 mg/dL    Performed by Naldo    GLUCOSE, POC    Collection Time: 02/13/21  9:25 PM   Result Value Ref Range    Glucose (POC) 317 (H) 65 - 100 mg/dL    Performed by HER GAURI    METABOLIC PANEL, BASIC    Collection Time: 02/14/21  7:00 AM   Result Value Ref Range    Sodium 140 136 - 145 mmol/L    Potassium 4.7 3.5 - 5.1 mmol/L    Chloride 107 97 - 108 mmol/L    CO2 22 21 - 32 mmol/L    Anion gap 11 5 - 15 mmol/L    Glucose 173 (H) 65 - 100 mg/dL    BUN 49 (H) 6 - 20 mg/dL Creatinine 2.36 (H) 0.70 - 1.30 mg/dL    BUN/Creatinine ratio 21 (H) 12 - 20      GFR est AA 33 (L) >60 ml/min/1.73m2    GFR est non-AA 27 (L) >60 ml/min/1.73m2    Calcium 8.6 8.5 - 10.1 mg/dL   CBC WITH AUTOMATED DIFF    Collection Time: 02/14/21  7:00 AM   Result Value Ref Range    WBC 5.5 4.4 - 11.3 K/uL    RBC 3.52 (L) 4.50 - 5.90 M/uL    HGB 11.0 (L) 13.5 - 17.5 g/dL    HCT 33.9 (L) 41 - 53 %    MCV 96.2 80 - 100 FL    MCH 31.2 31 - 34 PG    MCHC 32.5 31.0 - 36.0 g/dL    RDW 15.7 (H) 11.5 - 14.5 %    PLATELET 400 K/uL    MPV 8.2 6.5 - 11.5 FL    NRBC 0.1  WBC    ABSOLUTE NRBC 0.00 K/uL    NEUTROPHILS 87 (H) 42 - 75 %    LYMPHOCYTES 8 (L) 20.5 - 51.1 %    MONOCYTES 5 1.7 - 9.3 %    EOSINOPHILS 0 (L) 0.9 - 2.9 %    BASOPHILS 0 0.0 - 2.5 %    ABS. NEUTROPHILS 4.8 1.8 - 7.7 K/UL    ABS. LYMPHOCYTES 0.5 (L) 1.0 - 4.8 K/UL    ABS. MONOCYTES 0.3 0.2 - 2.4 K/UL    ABS. EOSINOPHILS 0.0 0.0 - 0.7 K/UL    ABS. BASOPHILS 0.0 0.0 - 0.2 K/UL   D DIMER    Collection Time: 02/14/21  7:00 AM   Result Value Ref Range    D-dimer 9.30 (H) 0.19 - 0.50 mg/L FEU   GLUCOSE, POC    Collection Time: 02/14/21  8:18 AM   Result Value Ref Range    Glucose (POC) 165 (H) 65 - 100 mg/dL    Performed by Wenda Bloch         Imaging:  Xr Chest Port    Result Date: 2/13/2021  The lungs appear hypoinflated compared with previous, with diffuse worsening of bilateral multicentric airspace disease, consistent with worsening pneumonia. Asymmetric elevation of right hemidiaphragm. No definite pneumothorax or pleural effusion. Cardiopericardial enlargement. Arterial calcification.        Assessment//Plan           Problem List:  Hospital Problems  Never Reviewed          Codes Class Noted POA    COVID-19 ICD-10-CM: U07.1  ICD-9-CM: 079.89  2/13/2021 Unknown        Dyspnea ICD-10-CM: R06.00  ICD-9-CM: 786.09  2/13/2021 Unknown        COPD (chronic obstructive pulmonary disease) ([facility] ICD-10-CM: J44.9  ICD-9-CM: 496  2/13/2021 Unknown Positive D dimer ICD-10-CM: R79.89  ICD-9-CM: 790.92  2/13/2021 Unknown        CKD (chronic kidney disease) ICD-10-CM: N18.9  ICD-9-CM: 585.9  2/13/2021 Unknown              COVID-19 PNA  -Was noted on 2/9 to have positive PCR testing for Covid and chest x-ray shows bilateral multicentric airspace disease worsening  -No hypoxia noted  - albuterol inhaler 4 puffs 4 times daily  -Encourage incentive spirometry every 2 hours while awake  -Start ascorbic acid, zinc, vitamin D  - start steroids initially with prednisone 40 mg oral every 8 hours and change to IV dosing once IV line  -Zosyn 3.375 mg IV every 8 hours to start once IV line is placed until then start Augmentin 875 mg / 125 mg oral twice daily  -With no hypoxia we will not start remdesivir  --Obtain sputum culture  -  D dimer elevated at 4.42 and increased on 2/14 to 9.3  - obtain CRP and procalcitonin      COPD exacerbation  -Has fairly extensive history of tobacco use of 1/2 pack/day for unknown duration but did state he quit 1 month back  -Has decreased breath sounds and wheeze noted on auscultation   -Start albuterol 4 puffs 4 times daily  -Start prednisone 40 mg oral every 8 hours until IV line is placed  -Montelukast 10 mg daily  -Mucinex twice daily  -Sputum culture  - -2/14: cotinues to have extensive wheezing but still not hypoxic, increase steroids to q6hrs.      Elevated D-dimer  -Was 4.72 on 2/13 but increased to 9.3 and continue to trend  -Started on Lovenox 1 mg/kg daily dosing secondary to CKD  -Unable to do CTA secondary to CKD as well as unable to do V/Q secondary to COVID-19 diagnosis        Diabetes mellitus on insulin  -Patient states he takes 26 units of regular insulin in the a.m. and 60 units of Lantus at night  -Obtain A1c  -Follow with regular ¢ scale with Accu-Cheks AC at bedtime  -Patient had his own Lantus and took his Lantus with dinner and nurse instructed patient he is not to take his own medications and will reorder his Lantus dosing for tomorrow night hold on ordering his regular insulin dosing at 26 units in the a.m.  -Nutrition consult on Monday  - 2/14: glucose of 181, continue current lantus at night and sliding scale.      BIRD on CKD  -Last baseline creatinine of 2 patient does not appear to follow with any nephrologist  -Obtain nephrology consultation on Monday  -Once IV line placed start patient on half NS with 20 meq KCl at 75 an hour  -Renal ultrasound  -Urinalysis with protein creatinine ratio and urine lites  -Monitor daily renal function  - 2/14 bun/creat improved to 49/2.36     Chronic pain   -States he has chronic pain from rheumatoid arthritis but does not appear to follow a rheumatologist only PCP  -Methadone 10 mg oral 4 times daily noted on medication list as well as tramadol noted and will hold until confirmed but nurse found out that patient stated he uses methadone prn basis   -Tramadol contraindicated with patient's CKD     Diabetic neuropathy  -Restart patient's gabapentin 300 mg oral 3 times daily     Hypertension  -No noted medications for blood pressure  -On admission blood pressure 153/92  -Start Norvasc 5 mg oral x1 now and daily  -Monitor every 4 hours     GI prophylaxis  -We will restart patient's home omeprazole 40 mg daily     DVT prophylaxis  Lovenox 1 mg/kg subcu daily     CODE STATUS: Patient wishes to be a DNR/DNI  Patient designates his daughter as his medical decision-maker if for some reason he is unable to make decision for himself       DNR     Spent 30 minutes evaluting and coordinating patient care of which >50% was spent coordinating and counseling.      Electronically signed by Sonia Santana MD on 2/14/2021 at 10:00 AM

## 2021-02-14 NOTE — ED NOTES
Assumed care of pt, pt resting on hospital bed in [facility]. Pt with no PIV access, PICC line team called but will not be able to see the pt until Monday. This RN attempted to start a line but pt refused to be stuck again.

## 2021-02-14 NOTE — ED NOTES
Pt resting comfortably in bed, continuing to refuse this RN to attempt PIV access. Pt provided water per his request, no other complaints at this time.

## 2021-02-15 LAB
ALBUMIN SERPL-MCNC: 1.9 G/DL (ref 3.5–5)
ANION GAP SERPL CALC-SCNC: 11 MMOL/L (ref 5–15)
BASOPHILS # BLD: 0 K/UL (ref 0–0.2)
BASOPHILS NFR BLD: 0 % (ref 0–2.5)
BUN SERPL-MCNC: 58 MG/DL (ref 6–20)
BUN/CREAT SERPL: 20 (ref 12–20)
CA-I BLD-MCNC: 8.1 MG/DL (ref 8.5–10.1)
CHLORIDE SERPL-SCNC: 102 MMOL/L (ref 97–108)
CO2 SERPL-SCNC: 19 MMOL/L (ref 21–32)
CREAT SERPL-MCNC: 2.83 MG/DL (ref 0.7–1.3)
D DIMER PPP FEU-MCNC: >35.2 MG/L FEU (ref 0.19–0.5)
EOSINOPHIL # BLD: 0 K/UL (ref 0–0.7)
EOSINOPHIL NFR BLD: 0 % (ref 0.9–2.9)
ERYTHROCYTE [DISTWIDTH] IN BLOOD BY AUTOMATED COUNT: 15.3 % (ref 11.5–14.5)
GLUCOSE BLD STRIP.AUTO-MCNC: 390 MG/DL (ref 65–100)
GLUCOSE BLD STRIP.AUTO-MCNC: 427 MG/DL (ref 65–100)
GLUCOSE BLD STRIP.AUTO-MCNC: 450 MG/DL (ref 65–100)
GLUCOSE BLD STRIP.AUTO-MCNC: 477 MG/DL (ref 65–100)
GLUCOSE SERPL-MCNC: 386 MG/DL (ref 65–100)
HCT VFR BLD AUTO: 30.7 % (ref 41–53)
HGB BLD-MCNC: 10.3 G/DL (ref 13.5–17.5)
LYMPHOCYTES # BLD: 0.4 K/UL (ref 1–4.8)
LYMPHOCYTES NFR BLD: 5 % (ref 20.5–51.1)
MCH RBC QN AUTO: 32.3 PG (ref 31–34)
MCHC RBC AUTO-ENTMCNC: 33.7 G/DL (ref 31–36)
MCV RBC AUTO: 95.8 FL (ref 80–100)
MONOCYTES # BLD: 0.3 K/UL (ref 0.2–2.4)
MONOCYTES NFR BLD: 4 % (ref 1.7–9.3)
NEUTS SEG # BLD: 7.9 K/UL (ref 1.8–7.7)
NEUTS SEG NFR BLD: 91 % (ref 42–75)
NRBC # BLD: 0.01 K/UL
NRBC BLD-RTO: 0.1 PER 100 WBC
PERFORMED BY, TECHID: ABNORMAL
PHOSPHATE SERPL-MCNC: 3.9 MG/DL (ref 2.6–4.7)
PLATELET # BLD AUTO: 423 K/UL
PMV BLD AUTO: 8.7 FL (ref 6.5–11.5)
POTASSIUM SERPL-SCNC: 4.9 MMOL/L (ref 3.5–5.1)
RBC # BLD AUTO: 3.2 M/UL (ref 4.5–5.9)
SODIUM SERPL-SCNC: 132 MMOL/L (ref 136–145)
WBC # BLD AUTO: 8.6 K/UL (ref 4.4–11.3)

## 2021-02-15 PROCEDURE — 94640 AIRWAY INHALATION TREATMENT: CPT

## 2021-02-15 PROCEDURE — 85025 COMPLETE CBC W/AUTO DIFF WBC: CPT

## 2021-02-15 PROCEDURE — 74011000258 HC RX REV CODE- 258: Performed by: HOSPITALIST

## 2021-02-15 PROCEDURE — 74011636637 HC RX REV CODE- 636/637: Performed by: HOSPITALIST

## 2021-02-15 PROCEDURE — 65270000029 HC RM PRIVATE

## 2021-02-15 PROCEDURE — 74011000258 HC RX REV CODE- 258: Performed by: FAMILY MEDICINE

## 2021-02-15 PROCEDURE — 80069 RENAL FUNCTION PANEL: CPT

## 2021-02-15 PROCEDURE — 82962 GLUCOSE BLOOD TEST: CPT

## 2021-02-15 PROCEDURE — 74011636637 HC RX REV CODE- 636/637: Performed by: NURSE PRACTITIONER

## 2021-02-15 PROCEDURE — 36415 COLL VENOUS BLD VENIPUNCTURE: CPT

## 2021-02-15 PROCEDURE — 74011636637 HC RX REV CODE- 636/637: Performed by: FAMILY MEDICINE

## 2021-02-15 PROCEDURE — 85379 FIBRIN DEGRADATION QUANT: CPT

## 2021-02-15 PROCEDURE — 74011000250 HC RX REV CODE- 250: Performed by: FAMILY MEDICINE

## 2021-02-15 PROCEDURE — 74011250637 HC RX REV CODE- 250/637: Performed by: FAMILY MEDICINE

## 2021-02-15 PROCEDURE — 74011250637 HC RX REV CODE- 250/637: Performed by: HOSPITALIST

## 2021-02-15 PROCEDURE — 74011250636 HC RX REV CODE- 250/636: Performed by: HOSPITALIST

## 2021-02-15 RX ORDER — INSULIN GLARGINE 100 [IU]/ML
75 INJECTION, SOLUTION SUBCUTANEOUS
Status: DISCONTINUED | OUTPATIENT
Start: 2021-02-15 | End: 2021-02-17 | Stop reason: HOSPADM

## 2021-02-15 RX ORDER — INSULIN LISPRO 100 [IU]/ML
10 INJECTION, SOLUTION INTRAVENOUS; SUBCUTANEOUS ONCE
Status: COMPLETED | OUTPATIENT
Start: 2021-02-15 | End: 2021-02-15

## 2021-02-15 RX ORDER — INSULIN LISPRO 100 [IU]/ML
12 INJECTION, SOLUTION INTRAVENOUS; SUBCUTANEOUS ONCE
Status: COMPLETED | OUTPATIENT
Start: 2021-02-15 | End: 2021-02-15

## 2021-02-15 RX ORDER — INSULIN LISPRO 100 [IU]/ML
8 INJECTION, SOLUTION INTRAVENOUS; SUBCUTANEOUS ONCE
Status: COMPLETED | OUTPATIENT
Start: 2021-02-15 | End: 2021-02-15

## 2021-02-15 RX ORDER — INSULIN GLARGINE 100 [IU]/ML
15 INJECTION, SOLUTION SUBCUTANEOUS
Status: COMPLETED | OUTPATIENT
Start: 2021-02-15 | End: 2021-02-15

## 2021-02-15 RX ORDER — INSULIN LISPRO 100 [IU]/ML
5 INJECTION, SOLUTION INTRAVENOUS; SUBCUTANEOUS
Status: DISCONTINUED | OUTPATIENT
Start: 2021-02-15 | End: 2021-02-17 | Stop reason: HOSPADM

## 2021-02-15 RX ORDER — GABAPENTIN 100 MG/1
100 CAPSULE ORAL EVERY 8 HOURS
Status: DISCONTINUED | OUTPATIENT
Start: 2021-02-15 | End: 2021-02-17 | Stop reason: HOSPADM

## 2021-02-15 RX ORDER — ENOXAPARIN SODIUM 100 MG/ML
1 INJECTION SUBCUTANEOUS EVERY 24 HOURS
Status: DISCONTINUED | OUTPATIENT
Start: 2021-02-16 | End: 2021-02-17 | Stop reason: HOSPADM

## 2021-02-15 RX ADMIN — Medication 10 ML: at 22:05

## 2021-02-15 RX ADMIN — Medication 10 ML: at 05:42

## 2021-02-15 RX ADMIN — AMOXICILLIN AND CLAVULANATE POTASSIUM 1 TABLET: 875; 125 TABLET, FILM COATED ORAL at 17:18

## 2021-02-15 RX ADMIN — METHYLPREDNISOLONE SODIUM SUCCINATE 40 MG: 125 INJECTION, POWDER, FOR SOLUTION INTRAMUSCULAR; INTRAVENOUS at 00:07

## 2021-02-15 RX ADMIN — ALBUTEROL SULFATE 4 PUFF: 108 AEROSOL, METERED RESPIRATORY (INHALATION) at 08:02

## 2021-02-15 RX ADMIN — AMOXICILLIN AND CLAVULANATE POTASSIUM 1 TABLET: 875; 125 TABLET, FILM COATED ORAL at 08:48

## 2021-02-15 RX ADMIN — GABAPENTIN 300 MG: 300 CAPSULE ORAL at 08:47

## 2021-02-15 RX ADMIN — ASPIRIN 81 MG: 81 TABLET, COATED ORAL at 08:48

## 2021-02-15 RX ADMIN — SODIUM BICARBONATE: 84 INJECTION, SOLUTION INTRAVENOUS at 11:12

## 2021-02-15 RX ADMIN — ALBUTEROL SULFATE 4 PUFF: 108 AEROSOL, METERED RESPIRATORY (INHALATION) at 15:24

## 2021-02-15 RX ADMIN — METHYLPREDNISOLONE SODIUM SUCCINATE 40 MG: 125 INJECTION, POWDER, FOR SOLUTION INTRAMUSCULAR; INTRAVENOUS at 05:42

## 2021-02-15 RX ADMIN — GUAIFENESIN 600 MG: 600 TABLET, EXTENDED RELEASE ORAL at 08:47

## 2021-02-15 RX ADMIN — GUAIFENESIN 600 MG: 600 TABLET, EXTENDED RELEASE ORAL at 21:58

## 2021-02-15 RX ADMIN — METHYLPREDNISOLONE SODIUM SUCCINATE 40 MG: 125 INJECTION, POWDER, FOR SOLUTION INTRAMUSCULAR; INTRAVENOUS at 17:19

## 2021-02-15 RX ADMIN — FAMOTIDINE 20 MG: 20 TABLET ORAL at 17:19

## 2021-02-15 RX ADMIN — ENOXAPARIN SODIUM 90 MG: 100 INJECTION SUBCUTANEOUS at 12:53

## 2021-02-15 RX ADMIN — PIPERACILLIN AND TAZOBACTAM 3.38 G: 3; .375 INJECTION, POWDER, LYOPHILIZED, FOR SOLUTION INTRAVENOUS at 14:28

## 2021-02-15 RX ADMIN — BUDESONIDE AND FORMOTEROL FUMARATE DIHYDRATE 2 PUFF: 160; 4.5 AEROSOL RESPIRATORY (INHALATION) at 08:01

## 2021-02-15 RX ADMIN — GABAPENTIN 100 MG: 100 CAPSULE ORAL at 22:04

## 2021-02-15 RX ADMIN — PIPERACILLIN AND TAZOBACTAM 3.38 G: 3; .375 INJECTION, POWDER, LYOPHILIZED, FOR SOLUTION INTRAVENOUS at 22:05

## 2021-02-15 RX ADMIN — AMLODIPINE BESYLATE 5 MG: 5 TABLET ORAL at 08:48

## 2021-02-15 RX ADMIN — OXYCODONE HYDROCHLORIDE AND ACETAMINOPHEN 1000 MG: 500 TABLET ORAL at 17:19

## 2021-02-15 RX ADMIN — GABAPENTIN 100 MG: 100 CAPSULE ORAL at 14:28

## 2021-02-15 RX ADMIN — METHYLPREDNISOLONE SODIUM SUCCINATE 40 MG: 125 INJECTION, POWDER, FOR SOLUTION INTRAMUSCULAR; INTRAVENOUS at 12:24

## 2021-02-15 RX ADMIN — INSULIN GLARGINE 15 UNITS: 100 INJECTION, SOLUTION SUBCUTANEOUS at 12:24

## 2021-02-15 RX ADMIN — FAMOTIDINE 20 MG: 20 TABLET ORAL at 08:47

## 2021-02-15 RX ADMIN — ALBUTEROL SULFATE 4 PUFF: 108 AEROSOL, METERED RESPIRATORY (INHALATION) at 19:48

## 2021-02-15 RX ADMIN — ZINC SULFATE 220 MG (50 MG) CAPSULE 1 CAPSULE: CAPSULE at 08:48

## 2021-02-15 RX ADMIN — INSULIN GLARGINE 75 UNITS: 100 INJECTION, SOLUTION SUBCUTANEOUS at 22:06

## 2021-02-15 RX ADMIN — PIPERACILLIN AND TAZOBACTAM 3.38 G: 3; .375 INJECTION, POWDER, LYOPHILIZED, FOR SOLUTION INTRAVENOUS at 05:45

## 2021-02-15 RX ADMIN — INSULIN LISPRO 12 UNITS: 100 INJECTION, SOLUTION INTRAVENOUS; SUBCUTANEOUS at 12:25

## 2021-02-15 RX ADMIN — INSULIN LISPRO 8 UNITS: 100 INJECTION, SOLUTION INTRAVENOUS; SUBCUTANEOUS at 08:47

## 2021-02-15 RX ADMIN — BUDESONIDE AND FORMOTEROL FUMARATE DIHYDRATE 2 PUFF: 160; 4.5 AEROSOL RESPIRATORY (INHALATION) at 19:48

## 2021-02-15 RX ADMIN — Medication 3000 UNITS: at 08:48

## 2021-02-15 RX ADMIN — OXYCODONE HYDROCHLORIDE AND ACETAMINOPHEN 1000 MG: 500 TABLET ORAL at 08:48

## 2021-02-15 RX ADMIN — MONTELUKAST SODIUM 10 MG: 10 TABLET ORAL at 22:04

## 2021-02-15 RX ADMIN — INSULIN LISPRO 7 UNITS: 100 INJECTION, SOLUTION INTRAVENOUS; SUBCUTANEOUS at 16:35

## 2021-02-15 RX ADMIN — INSULIN LISPRO 5 UNITS: 100 INJECTION, SOLUTION INTRAVENOUS; SUBCUTANEOUS at 16:34

## 2021-02-15 RX ADMIN — INSULIN LISPRO 10 UNITS: 100 INJECTION, SOLUTION INTRAVENOUS; SUBCUTANEOUS at 22:05

## 2021-02-15 NOTE — PROGRESS NOTES
Progress Note    Patient: Juana Mendez MRN: [id number]  SSN: [id number]    YOB: 1945  Age: 70 y.o. Sex: male      Admit Date: 2/13/2021    LOS: 2 days     Subjective:     Patient presents with shortness of breath, currently being treated for COPD exacerbation. Patient states he has had some cough. Remains on room air. Objective:     Vitals:    02/15/21 0400 02/15/21 0519 02/15/21 0802 02/15/21 1212   BP:  (!) 154/70 (!) 159/80 (!) 145/75   Pulse: 77 76 77 (!) 44   Resp:  18 18 18   Temp:  98.1 °F (36.7 °C) 98 °F (36.7 °C) 98.6 °F (37 °C)   SpO2:  94% 96% 92%   Weight:       Height:            Intake and Output:  Current Shift: No intake/output data recorded. Last three shifts: No intake/output data recorded. Physical Exam:   GENERAL: alert, cooperative, no distress, appears stated age  THROAT & NECK: normal and no erythema or exudates noted. LUNG end expiratory rhonchi present  HEART: regular rate and rhythm, S1, S2 normal, no murmur, click, rub or gallop  ABDOMEN: soft, non-tender. Bowel sounds normal. No masses,  no organomegaly  EXTREMITIES:  extremities normal, atraumatic, no cyanosis or edema  SKIN: no rash or abnormalities  NEUROLOGIC: AOx3. PSYCHIATRIC: non focal    Lab/Data Review: All lab results for the last 24 hours reviewed.      Recent Results (from the past 24 hour(s))   GLUCOSE, POC    Collection Time: 02/14/21  6:01 PM   Result Value Ref Range    Glucose (POC) 306 (H) 65 - 100 mg/dL    Performed by Johanne Goodell, POC    Collection Time: 02/14/21  8:26 PM   Result Value Ref Range    Glucose (POC) 294 (H) 65 - 100 mg/dL    Performed by Sandra Florez    D DIMER    Collection Time: 02/15/21  5:41 AM   Result Value Ref Range    D-dimer >35.20 (H) 0.19 - 0.50 mg/L FEU   RENAL FUNCTION PANEL    Collection Time: 02/15/21  5:41 AM   Result Value Ref Range    Sodium 132 (L) 136 - 145 mmol/L    Potassium 4.9 3.5 - 5.1 mmol/L    Chloride 102 97 - 108 mmol/L    CO2 19 (L) 21 - 32 mmol/L    Anion gap 11 5 - 15 mmol/L    Glucose 386 (H) 65 - 100 mg/dL    BUN 58 (H) 6 - 20 mg/dL    Creatinine 2.83 (H) 0.70 - 1.30 mg/dL    BUN/Creatinine ratio 20 12 - 20      GFR est AA 27 (L) >60 ml/min/1.73m2    GFR est non-AA 22 (L) >60 ml/min/1.73m2    Calcium 8.1 (L) 8.5 - 10.1 mg/dL    Phosphorus 3.9 2.6 - 4.7 mg/dL    Albumin 1.9 (L) 3.5 - 5.0 g/dL   CBC WITH AUTOMATED DIFF    Collection Time: 02/15/21  5:41 AM   Result Value Ref Range    WBC 8.6 4.4 - 11.3 K/uL    RBC 3.20 (L) 4.50 - 5.90 M/uL    HGB 10.3 (L) 13.5 - 17.5 g/dL    HCT 30.7 (L) 41 - 53 %    MCV 95.8 80 - 100 FL    MCH 32.3 31 - 34 PG    MCHC 33.7 31.0 - 36.0 g/dL    RDW 15.3 (H) 11.5 - 14.5 %    PLATELET 836 K/uL    MPV 8.7 6.5 - 11.5 FL    NRBC 0.1  WBC    ABSOLUTE NRBC 0.01 K/uL    NEUTROPHILS 91 (H) 42 - 75 %    LYMPHOCYTES 5 (L) 20.5 - 51.1 %    MONOCYTES 4 1.7 - 9.3 %    EOSINOPHILS 0 (L) 0.9 - 2.9 %    BASOPHILS 0 0.0 - 2.5 %    ABS. NEUTROPHILS 7.9 (H) 1.8 - 7.7 K/UL    ABS. LYMPHOCYTES 0.4 (L) 1.0 - 4.8 K/UL    ABS. MONOCYTES 0.3 0.2 - 2.4 K/UL    ABS. EOSINOPHILS 0.0 0.0 - 0.7 K/UL    ABS. BASOPHILS 0.0 0.0 - 0.2 K/UL   GLUCOSE, POC    Collection Time: 02/15/21  7:59 AM   Result Value Ref Range    Glucose (POC) 390 (H) 65 - 100 mg/dL    Performed by [address] [place] [address], POC    Collection Time: 02/15/21 12:05 PM   Result Value Ref Range    Glucose (POC) 427 (H) 65 - 100 mg/dL    Performed by Maddy Nieves         Imaging:    No results found.        Assessment and Plan:     COVID-19 pneumonia  -Patient was tested positive on 2/9  -Chest x-ray this admission showing worsening bilateral airspace disease  -Currently not hypoxic and therefore does not meet criteria for remdesivir  -Continue vitamin C and zinc  -Empirically started on antibiotics    COPD exacerbation  -Continue steroid along with breathing treatment and bronchodilators  -Currently not hypoxic    Abnormal D-dimer  -D-dimer elevated to more than 28  -Less likely PE due to patient not being hypoxic  -Elevated D-dimer likely from inflammatory response from COVID-19 infection  -Change anticoagulation to full dose    Abnormal renal function  -Patient with BIRD on CKD, baseline k creatinine around 2  -Neurology evaluated the patient  -Start IV fluid with bicarb due to low bicarb level    Hypertension  -Continue Norvasc and monitor blood pressure    Diabetes  -Elevated blood sugars on steroid  -Adjust Lantus to 75 units daily  -Start premeal insulin with lispro 5 units 3 times daily  -Continue insulin sliding scale coverage    Chronic pain  -Patient on methadone and tramadol as outpatient  -Holding pain medications for now  -Reduced Neurontin dose to 100 mg 3 times daily due to abnormal renal function    Anticipated disposition is likely in 24 to 48 hours pending progress.     Signed By: Alvarado Bishop MD     February 15, 2021

## 2021-02-15 NOTE — ED NOTES
Patient transported to floor via wheelchair by [employer], [facility].

## 2021-02-15 NOTE — CONSULTS
Renal Consult Note    Admit Date: 2/13/2021   reason for consult   patient with BIRD on CKD    HPI:     Mr. Harry Stanley  Is a 79-year-old [place] American male with longstanding history of type 2 diabetes mellitus, hypertension, CAD with history of MI, COPD, DJD, rheumatoid arthritis with chronic substance abuse on methadone came to ER on February 9th year 2021 with chief complaint of shortness of breath and cough probably and chest x-ray done during that time revealed pneumonia and he was positive for COVID-19. Since patient was stable he was sent home on Zithromax and prednisone. Since patient continued to get worse he decided to come back on February 13, 2021 at which time his chest x-ray  Revealed worsening of infiltrates and he was very dyspneic hence it is decided to go ahead and admit this patient. .  Patient is known to have CKD and followed by nephrologist and his baseline serum creatinine was 2.0 on admission which has gone up hence renal consult is requested. At the time of exam today patient is resting comfortably and he is not in any pain are respiratory distress laying flat on the bed without any oxygen. Vital signs remained stable. Labs and medications are reviewed with Leslie Mueller  And decided to treat patient with sodium bicarbonate and IV fluids. Since he already had workup done there is no need for obtaining sonogram of kidneys at this time.     Current Facility-Administered Medications   Medication Dose Route Frequency    insulin glargine (LANTUS) injection 75 Units  75 Units SubCUTAneous QHS    [START ON 2/16/2021] enoxaparin (LOVENOX) injection 90 mg  1 mg/kg SubCUTAneous Q24H    gabapentin (NEURONTIN) capsule 100 mg  100 mg Oral Q8H    insulin lispro (HUMALOG) injection 5 Units  5 Units SubCUTAneous TIDAC    budesonide-formoteroL (SYMBICORT) 160-4.5 mcg/actuation HFA inhaler 2 Puff  2 Puff Inhalation BID RT    methylPREDNISolone (PF) (Solu-MEDROL) injection 40 mg  40 mg IntraVENous Q6H    [Held by provider] dextrose 5% and 0.9% NaCl infusion  100 mL/hr IntraVENous CONTINUOUS    sodium chloride (NS) flush 5-40 mL  5-40 mL IntraVENous Q8H    sodium chloride (NS) flush 5-40 mL  5-40 mL IntraVENous PRN    acetaminophen (TYLENOL) tablet 650 mg  650 mg Oral Q6H PRN    Or    acetaminophen (TYLENOL) suppository 650 mg  650 mg Rectal Q6H PRN    polyethylene glycol (MIRALAX) packet 17 g  17 g Oral DAILY PRN    albuterol (PROVENTIL HFA, VENTOLIN HFA, PROAIR HFA) inhaler 4 Puff  4 Puff Inhalation QID RT    guaiFENesin ER (MUCINEX) tablet 600 mg  600 mg Oral Q12H    ascorbic acid (vitamin C) (VITAMIN C) tablet 1,000 mg  1,000 mg Oral BID    zinc sulfate (ZINCATE) 220 (50) mg capsule 1 Cap  1 Cap Oral DAILY    piperacillin-tazobactam (ZOSYN) 3.375 g in 0.9% sodium chloride (MBP/ADV) 100 mL MBP  3.375 g IntraVENous Q8H    amoxicillin-clavulanate (AUGMENTIN) 875-125 mg per tablet 1 Tab  1 Tab Oral BID WITH MEALS    glucose chewable tablet 16 g  4 Tab Oral PRN    dextrose (D50W) injection syrg 12.5-25 g  25-50 mL IntraVENous PRN    glucagon (GLUCAGEN) injection 1 mg  1 mg IntraMUSCular PRN    insulin lispro (HUMALOG) injection   SubCUTAneous AC&HS    cholecalciferol (VITAMIN D3) (1000 Units /25 mcg) tablet 3,000 Units  3,000 Units Oral DAILY    aspirin delayed-release tablet 81 mg  81 mg Oral DAILY    montelukast (SINGULAIR) tablet 10 mg  10 mg Oral QHS    amLODIPine (NORVASC) tablet 5 mg  5 mg Oral DAILY    famotidine (PEPCID) tablet 20 mg  20 mg Oral BID        Past Medical History:   Diagnosis Date    Asthma     B12 deficiency     CAD (coronary artery disease)     Cataract     Chronic obstructive pulmonary disease (HCC)     CKD (chronic kidney disease)     Depression     Diabetes ([facility] [address]     DJD (degenerative joint disease)     Heart attack ([facility] [address]     Hypertension     Long term prescription opiate use     Neuropathy     Rheumatoid arthritis (HCC)     Ulcer       Past Surgical History: Procedure Laterality Date    HX ANGIOPLASTY      HX HEENT      HX ORTHOPAEDIC      CT CARDIAC SURG PROCEDURE UNLIST      RCA stent     Family History   Problem Relation Age of Onset    Diabetes Mother     Asthma Mother     Diabetes Father       Social History     Tobacco Use    Smoking status: Former Smoker    Smokeless tobacco: Former User   Substance Use Topics    Alcohol use: No         Review of Systems    ROS     Cardiovascular- no chest pain, shortness of breath, orthopnea, PND, swollen lower extremities. GI- no nausea, vomiting, abdominal pain and diarrhea. - no hematuria, polyuria, difficulty to urinate. Musculoskeletal system significant for joint pains. Physical Exam:     Physical Exam      Patient Vitals for the past 8 hrs:   BP Temp Pulse Resp SpO2   02/15/21 1610 136/75 98.6 °F (37 °C) (!) 105 (!) 1 95 %   02/15/21 1600   75     02/15/21 1212 (!) 145/75 98.6 °F (37 °C) (!) 44 18 92 %     No intake/output data recorded. No intake/output data recorded. O/E   elderly male moderately nourished and built  without any respiratory distress   HEENT-    Pupils are reacting to light,  No scleral icterus, conjunctiva  pale  NECK - Supple , no JVD noted  HEART -  No pericardial rub or  Murmur appreciated  LUNGS -  Significant for wheezing without any rhonchi or  Rales  ABDOMEN -  Soft, nontender  EXT -  No edema  NEURO -  Alert oriented x3 without any focal the      XR CHEST PORT   Final Result   The lungs appear hypoinflated compared with previous, with diffuse   worsening of bilateral multicentric airspace disease, consistent with worsening   pneumonia. Asymmetric elevation of right hemidiaphragm. No definite pneumothorax   or pleural effusion. Cardiopericardial enlargement. Arterial calcification.       US RETROPERITONEUM COMP    (Results Pending)        Data Review   Recent Labs     02/15/21  0541 02/14/21  0700 02/13/21  1145   WBC 8.6 5.5 7.5   HGB 10.3* 11.0* 11.6* HCT 30.7* 33.9* 35.0*    391 397     Recent Labs     02/15/21  0541 02/14/21  0700 02/13/21  1200 02/13/21  1145   * 140  --  143   K 4.9 4.7  --  3.6    107  --  108   CO2 19* 22  --  24   * 173*  --  63*   BUN 58* 49*  --  55*   CREA 2.83* 2.36*  --  2.63*   CA 8.1* 8.6  --  8.8   MG  --   --   --  2.1   PHOS 3.9  --   --   --    ALB 1.9*  --   --   --    INR  --   --  1.0  --      No components found for: GLPOC  No results for input(s): PH, PCO2, PO2, HCO3, FIO2 in the last 72 hours. Admission on 02/13/2021   Component Date Value Ref Range Status    WBC 02/13/2021 7.5  4.4 - 11.3 K/uL Final    RBC 02/13/2021 3.64* 4.50 - 5.90 M/uL Final    HGB 02/13/2021 11.6* 13.5 - 17.5 g/dL Final    HCT 02/13/2021 35.0* 41 - 53 % Final    MCV 02/13/2021 96.1  80 - 100 FL Final    MCH 02/13/2021 31.8  31 - 34 PG Final    MCHC 02/13/2021 33.1  31.0 - 36.0 g/dL Final    RDW 02/13/2021 15.5* 11.5 - 14.5 % Final    PLATELET 08/69/4735 651  K/uL Final    MPV 02/13/2021 8.2  6.5 - 11.5 FL Final    NRBC 02/13/2021 0.0   WBC Final    ABSOLUTE NRBC 02/13/2021 0.00  K/uL Final    NEUTROPHILS 02/13/2021 78* 42 - 75 % Final    LYMPHOCYTES 02/13/2021 11* 20.5 - 51.1 % Final    MONOCYTES 02/13/2021 11* 1.7 - 9.3 % Final    EOSINOPHILS 02/13/2021 0* 0.9 - 2.9 % Final    BASOPHILS 02/13/2021 0  0.0 - 2.5 % Final    ABS. NEUTROPHILS 02/13/2021 5.8  1.8 - 7.7 K/UL Final    ABS. LYMPHOCYTES 02/13/2021 0.8* 1.0 - 4.8 K/UL Final    ABS. MONOCYTES 02/13/2021 0.8  0.2 - 2.4 K/UL Final    ABS. EOSINOPHILS 02/13/2021 0.0  0.0 - 0.7 K/UL Final    ABS.  BASOPHILS 02/13/2021 0.0  0.0 - 0.2 K/UL Final    Sodium 02/13/2021 143  136 - 145 mmol/L Final    Potassium 02/13/2021 3.6  3.5 - 5.1 mmol/L Final    Chloride 02/13/2021 108  97 - 108 mmol/L Final    CO2 02/13/2021 24  21 - 32 mmol/L Final    Anion gap 02/13/2021 11  5 - 15 mmol/L Final    Glucose 02/13/2021 63* 65 - 100 mg/dL Final    BUN 02/13/2021 55* 6 - 20 mg/dL Final    Creatinine 02/13/2021 2.63* 0.70 - 1.30 mg/dL Final    BUN/Creatinine ratio 02/13/2021 21* 12 - 20   Final    GFR est AA 02/13/2021 29* >60 ml/min/1.73m2 Final    GFR est non-AA 02/13/2021 24* >60 ml/min/1.73m2 Final    Calcium 02/13/2021 8.8  8.5 - 10.1 mg/dL Final    Prothrombin time 02/13/2021 9.7  9.0 - 11.1 sec Final    INR 02/13/2021 1.0  0.9 - 1.1   Final    A single therapeutic range for Vit K antagonists may not be optimal for all indications - see June, 2008 issue of Chest, American College of Chest Physicians Evidence-Based Clinical Practice Guidelines, 8th Edition.  D-dimer 02/13/2021 4.72* 0.19 - 0.50 mg/L FEU Final    CALLED TREY RUELAS RN @1284 The combination of a low pre-test probability based on Wells criteria and a D-Dimer result below the cutoff value of 0.5 mg/L increases the negative predictive value for DVT to %.     Glucose (POC) 02/13/2021 62* 65 - 100 mg/dL Final    Performed by 02/13/2021 SURAJ ROUSE   Final    Magnesium 02/13/2021 2.1  1.6 - 2.4 mg/dL Final    Glucose (POC) 02/13/2021 273* 65 - 100 mg/dL Final    Performed by 02/13/2021 SURAJ ROUSE   Final    Hemoglobin A1c 02/13/2021 9.6* 4.0 - 5.6 % Final    Comment: NEW METHOD  PLEASE NOTE NEW REFERENCE RANGE  (NOTE)  HbA1C Interpretive Ranges  <5.7              Normal  5.7 - 6.4         Consider Prediabetes  >6.5              Consider Diabetes      Est. average glucose 02/13/2021 229  mg/dL Final    C-Reactive protein 02/13/2021 3.75* 0.00 - 0.60 mg/dL Final    Procalcitonin 02/13/2021 0.12  ng/mL Final    Glucose (POC) 02/13/2021 317* 65 - 100 mg/dL Final    Performed by 02/13/2021 BASS GAURI   Final    Sodium 02/14/2021 140  136 - 145 mmol/L Final    Potassium 02/14/2021 4.7  3.5 - 5.1 mmol/L Final    Chloride 02/14/2021 107  97 - 108 mmol/L Final    CO2 02/14/2021 22  21 - 32 mmol/L Final    Anion gap 02/14/2021 11  5 - 15 mmol/L Final    Glucose 02/14/2021 173* 65 - 100 mg/dL Final    BUN 02/14/2021 49* 6 - 20 mg/dL Final    Creatinine 02/14/2021 2.36* 0.70 - 1.30 mg/dL Final    BUN/Creatinine ratio 02/14/2021 21* 12 - 20   Final    GFR est AA 02/14/2021 33* >60 ml/min/1.73m2 Final    GFR est non-AA 02/14/2021 27* >60 ml/min/1.73m2 Final    Calcium 02/14/2021 8.6  8.5 - 10.1 mg/dL Final    WBC 02/14/2021 5.5  4.4 - 11.3 K/uL Final    RBC 02/14/2021 3.52* 4.50 - 5.90 M/uL Final    HGB 02/14/2021 11.0* 13.5 - 17.5 g/dL Final    HCT 02/14/2021 33.9* 41 - 53 % Final    MCV 02/14/2021 96.2  80 - 100 FL Final    MCH 02/14/2021 31.2  31 - 34 PG Final    MCHC 02/14/2021 32.5  31.0 - 36.0 g/dL Final    RDW 02/14/2021 15.7* 11.5 - 14.5 % Final    PLATELET 75/39/7354 579  K/uL Final    MPV 02/14/2021 8.2  6.5 - 11.5 FL Final    NRBC 02/14/2021 0.1   WBC Final    ABSOLUTE NRBC 02/14/2021 0.00  K/uL Final    NEUTROPHILS 02/14/2021 87* 42 - 75 % Final    LYMPHOCYTES 02/14/2021 8* 20.5 - 51.1 % Final    MONOCYTES 02/14/2021 5  1.7 - 9.3 % Final    EOSINOPHILS 02/14/2021 0* 0.9 - 2.9 % Final    BASOPHILS 02/14/2021 0  0.0 - 2.5 % Final    ABS. NEUTROPHILS 02/14/2021 4.8  1.8 - 7.7 K/UL Final    ABS. LYMPHOCYTES 02/14/2021 0.5* 1.0 - 4.8 K/UL Final    ABS. MONOCYTES 02/14/2021 0.3  0.2 - 2.4 K/UL Final    ABS. EOSINOPHILS 02/14/2021 0.0  0.0 - 0.7 K/UL Final    ABS. BASOPHILS 02/14/2021 0.0  0.0 - 0.2 K/UL Final    D-dimer 02/14/2021 9.30* 0.19 - 0.50 mg/L FEU Final    CALLED NAHUN MASTERSON RN @9226 REPEATED The combination of a low pre-test probability based on Wells criteria and a D-Dimer result below the cutoff value of 0.5 mg/L increases the negative predictive value for DVT to %.     Glucose (POC) 02/14/2021 165* 65 - 100 mg/dL Final    Performed by 02/14/2021 SURAJ ROUSE   Final    Glucose (POC) 02/14/2021 181* 65 - 100 mg/dL Final    Performed by 02/14/2021 SURAJ ROUSE   Final    C-Reactive protein 02/14/2021 6.71* 0.00 - 0.60 mg/dL Final    Glucose (POC) 02/14/2021 265* 65 - 100 mg/dL Final    Performed by 02/14/2021 Munira Faustin   Final    Glucose (POC) 02/14/2021 306* 65 - 100 mg/dL Final    Performed by 02/14/2021 SURAJ ROUSE   Final    Glucose (POC) 02/14/2021 294* 65 - 100 mg/dL Final    Performed by 02/14/2021 Yasmani Lewis   Final    D-dimer 02/15/2021 >35.20* 0.19 - 0.50 mg/L FEU Final    The combination of a low pre-test probability based on Wells criteria and a D-Dimer result below the cutoff value of 0.5 mg/L increases the negative predictive value for DVT to %.  Results verified, phoned to and read back by  [facility]-ANNALISA PARKER/SEC @0761 02/15/21 GKB    Sodium 02/15/2021 132* 136 - 145 mmol/L Final    Potassium 02/15/2021 4.9  3.5 - 5.1 mmol/L Final    Chloride 02/15/2021 102  97 - 108 mmol/L Final    CO2 02/15/2021 19* 21 - 32 mmol/L Final    Anion gap 02/15/2021 11  5 - 15 mmol/L Final    Glucose 02/15/2021 386* 65 - 100 mg/dL Final    BUN 02/15/2021 58* 6 - 20 mg/dL Final    Creatinine 02/15/2021 2.83* 0.70 - 1.30 mg/dL Final    BUN/Creatinine ratio 02/15/2021 20  12 - 20   Final    GFR est AA 02/15/2021 27* >60 ml/min/1.73m2 Final    GFR est non-AA 02/15/2021 22* >60 ml/min/1.73m2 Final    Calcium 02/15/2021 8.1* 8.5 - 10.1 mg/dL Final    Phosphorus 02/15/2021 3.9  2.6 - 4.7 mg/dL Final    Albumin 02/15/2021 1.9* 3.5 - 5.0 g/dL Final    WBC 02/15/2021 8.6  4.4 - 11.3 K/uL Final    RBC 02/15/2021 3.20* 4.50 - 5.90 M/uL Final    HGB 02/15/2021 10.3* 13.5 - 17.5 g/dL Final    HCT 02/15/2021 30.7* 41 - 53 % Final    MCV 02/15/2021 95.8  80 - 100 FL Final    MCH 02/15/2021 32.3  31 - 34 PG Final    MCHC 02/15/2021 33.7  31.0 - 36.0 g/dL Final    RDW 02/15/2021 15.3* 11.5 - 14.5 % Final    PLATELET 94/95/7933 409  K/uL Final    MPV 02/15/2021 8.7  6.5 - 11.5 FL Final    NRBC 02/15/2021 0.1   WBC Final    ABSOLUTE NRBC 02/15/2021 0.01  K/uL Final    NEUTROPHILS 02/15/2021 91* 42 - 75 % Final    LYMPHOCYTES 02/15/2021 5* 20.5 - 51.1 % Final    MONOCYTES 02/15/2021 4  1.7 - 9.3 % Final    EOSINOPHILS 02/15/2021 0* 0.9 - 2.9 % Final    BASOPHILS 02/15/2021 0  0.0 - 2.5 % Final    ABS. NEUTROPHILS 02/15/2021 7.9* 1.8 - 7.7 K/UL Final    ABS. LYMPHOCYTES 02/15/2021 0.4* 1.0 - 4.8 K/UL Final    ABS. MONOCYTES 02/15/2021 0.3  0.2 - 2.4 K/UL Final    ABS. EOSINOPHILS 02/15/2021 0.0  0.0 - 0.7 K/UL Final    ABS. BASOPHILS 02/15/2021 0.0  0.0 - 0.2 K/UL Final    Glucose (POC) 02/15/2021 390* 65 - 100 mg/dL Final    Performed by 02/15/2021 YOUNG ALKEISHA   Final    Glucose (POC) 02/15/2021 427* 65 - 100 mg/dL Final    Performed by 02/15/2021 YOUNG ALKEISHA   Final    Glucose (POC) 02/15/2021 477* 65 - 100 mg/dL Final    Performed by 02/15/2021 Patt Zazueta   Final   Admission on 02/09/2021, Discharged on 02/09/2021   Component Date Value Ref Range Status    Glucose (POC) 02/09/2021 500* 65 - 100 mg/dL Final    Performed by 02/09/2021 SURAJ ROUSE   Final    WBC 02/09/2021 7.9  4.4 - 11.3 K/uL Final    RBC 02/09/2021 3.53* 4.50 - 5.90 M/uL Final    HGB 02/09/2021 11.3* 13.5 - 17.5 g/dL Final    HCT 02/09/2021 34.4* 41 - 53 % Final    MCV 02/09/2021 97.6  80 - 100 FL Final    MCH 02/09/2021 32.0  31 - 34 PG Final    MCHC 02/09/2021 32.8  31.0 - 36.0 g/dL Final    RDW 02/09/2021 15.7* 11.5 - 14.5 % Final    PLATELET 97/41/3922 026  K/uL Final    MPV 02/09/2021 9.2  6.5 - 11.5 FL Final    NRBC 02/09/2021 0.0   WBC Final    ABSOLUTE NRBC 02/09/2021 0.00  K/uL Final    NEUTROPHILS 02/09/2021 92* 42 - 75 % Final    LYMPHOCYTES 02/09/2021 3* 20.5 - 51.1 % Final    MONOCYTES 02/09/2021 4  1.7 - 9.3 % Final    EOSINOPHILS 02/09/2021 0* 0.9 - 2.9 % Final    BASOPHILS 02/09/2021 1  0.0 - 2.5 % Final    ABS. NEUTROPHILS 02/09/2021 7.3  1.8 - 7.7 K/UL Final    ABS. LYMPHOCYTES 02/09/2021 0.3* 1.0 - 4.8 K/UL Final    ABS.  MONOCYTES 02/09/2021 0.4  0.2 - 2.4 K/UL Final  ABS. EOSINOPHILS 02/09/2021 0.0  0.0 - 0.7 K/UL Final    ABS. BASOPHILS 02/09/2021 0.0  0.0 - 0.2 K/UL Final    Prothrombin time 02/09/2021 10.2  9.0 - 11.1 sec Final    INR 02/09/2021 1.0  0.9 - 1.1   Final    A single therapeutic range for Vit K antagonists may not be optimal for all indications - see June, 2008 issue of Chest, American College of Chest Physicians Evidence-Based Clinical Practice Guidelines, 8th Edition.  Sodium 02/09/2021 135* 136 - 145 mmol/L Final    Potassium 02/09/2021 5.1  3.5 - 5.1 mmol/L Final    Chloride 02/09/2021 100  97 - 108 mmol/L Final    CO2 02/09/2021 22  21 - 32 mmol/L Final    Anion gap 02/09/2021 13  5 - 15 mmol/L Final    Glucose 02/09/2021 514* 65 - 100 mg/dL Final    Results verified, phoned to and read back by DR Rochelle Alvarez @ 0428 BY [facility]    BUN 02/09/2021 49* 6 - 20 mg/dL Final    Creatinine 02/09/2021 3.35* 0.70 - 1.30 mg/dL Final    BUN/Creatinine ratio 02/09/2021 15  12 - 20   Final    GFR est AA 02/09/2021 22* >60 ml/min/1.73m2 Final    GFR est non-AA 02/09/2021 18* >60 ml/min/1.73m2 Final    Comment: Estimated GFR is calculated using the IDMS-traceable Modification of Diet in Renal Disease (MDRD) Study equation, reported for both  Americans (GFRAA) and non- Americans (GFRNA), and normalized to 1.73m2 body surface area. The physician must decide which value applies to the patient. The MDRD study equation should only be used in individuals age 25 or older. It has not been validated for the following: pregnant women, patients with serious comorbid conditions, or on certain medications, or persons with extremes of body size, muscle mass, or nutritional status.  Calcium 02/09/2021 9.0  8.5 - 10.1 mg/dL Final    Bilirubin, total 02/09/2021 0.2  0.2 - 1.0 mg/dL Final    AST (SGOT) 02/09/2021 21  15 - 37 U/L Final    ALT (SGPT) 02/09/2021 22  12 - 78 U/L Final    Alk.  phosphatase 02/09/2021 67  45 - 117 U/L Final    Protein, total 02/09/2021 7.5  6.4 - 8.2 g/dL Final    Albumin 02/09/2021 2.4* 3.5 - 5.0 g/dL Final    Globulin 02/09/2021 5.1* 2.0 - 4.0 g/dL Final    A-G Ratio 02/09/2021 0.5* 1.1 - 2.2   Final    ALCOHOL(ETHYL),SERUM 02/09/2021 <4  <10 mg/dL Final    Magnesium 02/09/2021 2.3  1.6 - 2.4 mg/dL Final    Color 02/09/2021 Yellow/Straw    Final    Color Reference Range: Straw, Yellow or Dark Yellow    Appearance 02/09/2021 Clear  Clear   Final    Specific gravity 02/09/2021 1.025  1.003 - 1.030   Final    pH (UA) 02/09/2021 5.5  5.0 - 8.0   Final    Protein 02/09/2021 >300* Negative mg/dL Final    Glucose 02/09/2021 >1,000* Negative mg/dL Final    Ketone 02/09/2021 Negative  Negative mg/dL Final    Bilirubin 02/09/2021 Negative  Negative   Final    Blood 02/09/2021 Moderate* Negative   Final    Urobilinogen 02/09/2021 0.2  0.2 - 1.0 EU/dL Final    Nitrites 02/09/2021 Negative  Negative   Final    Leukocyte Esterase 02/09/2021 Negative  Negative   Final    Group A Strep Ag ID 02/09/2021 Negative    Final    Special Requests: 02/09/2021 No Special Requests    Final    Culture result: 02/09/2021 Normal respiratory ashlyn/no beta strep isolated    Final    Culture result: 02/09/2021     Final                    Value:Few  Filamentous fungus  NOTED      Acetone/Ketone serum, QL. 02/09/2021 Negative    Final    Glucose (POC) 02/09/2021 433* 65 - 100 mg/dL Final    Performed by 02/09/2021 SURAJ ROUSE   Final    Glucose (POC) 02/09/2021 426* 65 - 100 mg/dL Final    Performed by 02/09/2021 SURAJ ROUSE   Final    WBC 02/09/2021 5-10  0 - 5 /hpf Final    RBC 02/09/2021 5-10  0 - 3 /hpf Final    Bacteria 02/09/2021 Negative  Negative /hpf Final    Glucose (POC) 02/09/2021 384* 65 - 100 mg/dL Final    Performed by 02/09/2021 [facility]   Final    SARS-CoV-2 02/09/2021 Please find results under separate order    Final    SARS-CoV-2 02/09/2021 Detected* Not Detected   Final    Comment:  This nucleic acid amplification test was developed and its  performance characteristics determined by [employer]. Nucleic acid amplification tests include RT-PCR and TMA. This test  has not been FDA cleared or approved. This test has been authorized  by FDA under an Emergency Use Authorization (EUA). This test is only  authorized for the duration of time the declaration that  circumstances exist justifying the authorization of the emergency use  of in vitro diagnostic tests for detection of SARS-CoV-2 virus and/or  diagnosis of COVID-19 infection under section 564(b)(1) of the Act,  21 U. S.C. 168AAW-3(I) (1), unless the authorization is terminated or  revoked sooner. When diagnostic testing is negative, the possibility of a false  negative result should be considered in the context of a patient's  recent exposures and the presence of clinical signs and symptoms  consistent with COVID-19. An individual without symptoms of COVID-  19 and who is not shedding SARS-CoV-2 virus w                           ould expect to have a  negative (not detected) result in this assay.   Performed At: [place] [id number] [facility] [id number]  Yamil Beltran MD KALANI:[id number]            Assessment:      1  ACUTE KIDNEY INJURY- due to COVID sepsis       R/o  Acute GN - as patient has positive blood on UA along with increasing serum creatinine- if repeat urinalysis does not reveal any blood in the urine, he may not require any further workup   2  CKD-  Due to diabetic nephropathy   3 Metabolic acidosis- due to acute renal failure   4 Rule out nephrotic syndrome   5  Hypertension well controlled   6-Diabetes    7- Rheumatoid arthritis      Active Problems:    COVID-19 (2/13/2021)      Dyspnea (2/13/2021)      COPD (chronic obstructive pulmonary disease) ([facility] [address] (2/13/2021)      Positive D dimer (2/13/2021)      CKD (chronic kidney disease) (2/13/2021)        Plan:      continue IV fluids half normal with 1 amp of sodium bicarbonate at 75 mL/hour x1 bag   Avoid hypotension to prevent renal ischemia   Monitor strict intakes and outputs   Repeat urinalysis,  Obtain urine protein creatinine ratio    Obtained urine culture and sensitivity     Monitor patient for volume overload     Follow renal function with daily labs    Thank you for consult

## 2021-02-15 NOTE — ED NOTES
Assumed care from Felix Case, [address] [place] [address]. She reports she has called report to the floor already and once the patient has IV access, he can go upstairs.

## 2021-02-15 NOTE — ROUTINE PROCESS
TRANSFER - IN REPORT: 
 
Verbal report received from MEÑO Lopez on [id number]  being received from ED for routine progression of care Report consisted of patients Situation, Background, Assessment and  
Recommendations(SBAR). Information from the following report(s) SBAR was reviewed with the receiving nurse. Opportunity for questions and clarification was provided. Assessment completed upon patients arrival to unit and care assumed. Report was received @ 1905 from [facility] and pt to room 220 @ 2005

## 2021-02-15 NOTE — ROUTINE PROCESS
Monitor on. RA. No resp distress. MA intact with AB infusing. Voids in urinal. No acute distress noted. oacc dry cough.

## 2021-02-16 LAB
ANION GAP SERPL CALC-SCNC: 12 MMOL/L (ref 5–15)
BUN SERPL-MCNC: 65 MG/DL (ref 6–20)
BUN/CREAT SERPL: 24 (ref 12–20)
CA-I BLD-MCNC: 8.1 MG/DL (ref 8.5–10.1)
CHLORIDE SERPL-SCNC: 104 MMOL/L (ref 97–108)
CO2 SERPL-SCNC: 22 MMOL/L (ref 21–32)
CREAT SERPL-MCNC: 2.72 MG/DL (ref 0.7–1.3)
D DIMER PPP FEU-MCNC: >35.2 MG/L FEU (ref 0.19–0.5)
GLUCOSE BLD STRIP.AUTO-MCNC: 148 MG/DL (ref 65–100)
GLUCOSE BLD STRIP.AUTO-MCNC: 331 MG/DL (ref 65–100)
GLUCOSE BLD STRIP.AUTO-MCNC: 399 MG/DL (ref 65–100)
GLUCOSE BLD STRIP.AUTO-MCNC: 484 MG/DL (ref 65–100)
GLUCOSE SERPL-MCNC: 160 MG/DL (ref 65–100)
PERFORMED BY, TECHID: ABNORMAL
POTASSIUM SERPL-SCNC: 4.6 MMOL/L (ref 3.5–5.1)
SODIUM SERPL-SCNC: 138 MMOL/L (ref 136–145)

## 2021-02-16 PROCEDURE — 80048 BASIC METABOLIC PNL TOTAL CA: CPT

## 2021-02-16 PROCEDURE — 74011250636 HC RX REV CODE- 250/636: Performed by: HOSPITALIST

## 2021-02-16 PROCEDURE — 82962 GLUCOSE BLOOD TEST: CPT

## 2021-02-16 PROCEDURE — 93005 ELECTROCARDIOGRAM TRACING: CPT

## 2021-02-16 PROCEDURE — 74011636637 HC RX REV CODE- 636/637: Performed by: FAMILY MEDICINE

## 2021-02-16 PROCEDURE — 74011000258 HC RX REV CODE- 258: Performed by: HOSPITALIST

## 2021-02-16 PROCEDURE — 85379 FIBRIN DEGRADATION QUANT: CPT

## 2021-02-16 PROCEDURE — 94640 AIRWAY INHALATION TREATMENT: CPT

## 2021-02-16 PROCEDURE — 74011250637 HC RX REV CODE- 250/637: Performed by: HOSPITALIST

## 2021-02-16 PROCEDURE — 65270000029 HC RM PRIVATE

## 2021-02-16 PROCEDURE — 74011250636 HC RX REV CODE- 250/636: Performed by: FAMILY MEDICINE

## 2021-02-16 PROCEDURE — 74011636637 HC RX REV CODE- 636/637: Performed by: HOSPITALIST

## 2021-02-16 PROCEDURE — 74011250637 HC RX REV CODE- 250/637: Performed by: FAMILY MEDICINE

## 2021-02-16 RX ORDER — TRAMADOL HYDROCHLORIDE 50 MG/1
50 TABLET ORAL
Status: DISCONTINUED | OUTPATIENT
Start: 2021-02-16 | End: 2021-02-17 | Stop reason: HOSPADM

## 2021-02-16 RX ORDER — PREDNISONE 20 MG/1
20 TABLET ORAL 2 TIMES DAILY WITH MEALS
Status: DISCONTINUED | OUTPATIENT
Start: 2021-02-16 | End: 2021-02-17 | Stop reason: HOSPADM

## 2021-02-16 RX ADMIN — FAMOTIDINE 20 MG: 20 TABLET ORAL at 17:09

## 2021-02-16 RX ADMIN — INSULIN LISPRO 7 UNITS: 100 INJECTION, SOLUTION INTRAVENOUS; SUBCUTANEOUS at 15:54

## 2021-02-16 RX ADMIN — Medication 10 ML: at 13:49

## 2021-02-16 RX ADMIN — AMOXICILLIN AND CLAVULANATE POTASSIUM 1 TABLET: 875; 125 TABLET, FILM COATED ORAL at 08:17

## 2021-02-16 RX ADMIN — OXYCODONE HYDROCHLORIDE AND ACETAMINOPHEN 1000 MG: 500 TABLET ORAL at 08:17

## 2021-02-16 RX ADMIN — METHYLPREDNISOLONE SODIUM SUCCINATE 40 MG: 125 INJECTION, POWDER, FOR SOLUTION INTRAMUSCULAR; INTRAVENOUS at 05:52

## 2021-02-16 RX ADMIN — Medication 10 ML: at 05:52

## 2021-02-16 RX ADMIN — PREDNISONE 20 MG: 20 TABLET ORAL at 17:09

## 2021-02-16 RX ADMIN — AMLODIPINE BESYLATE 5 MG: 5 TABLET ORAL at 08:17

## 2021-02-16 RX ADMIN — Medication 10 ML: at 23:28

## 2021-02-16 RX ADMIN — ALBUTEROL SULFATE 4 PUFF: 108 AEROSOL, METERED RESPIRATORY (INHALATION) at 11:26

## 2021-02-16 RX ADMIN — INSULIN LISPRO 8 UNITS: 100 INJECTION, SOLUTION INTRAVENOUS; SUBCUTANEOUS at 23:25

## 2021-02-16 RX ADMIN — GABAPENTIN 100 MG: 100 CAPSULE ORAL at 23:25

## 2021-02-16 RX ADMIN — GUAIFENESIN 600 MG: 600 TABLET, EXTENDED RELEASE ORAL at 23:25

## 2021-02-16 RX ADMIN — AMOXICILLIN AND CLAVULANATE POTASSIUM 1 TABLET: 875; 125 TABLET, FILM COATED ORAL at 17:09

## 2021-02-16 RX ADMIN — OXYCODONE HYDROCHLORIDE AND ACETAMINOPHEN 1000 MG: 500 TABLET ORAL at 17:09

## 2021-02-16 RX ADMIN — INSULIN LISPRO 5 UNITS: 100 INJECTION, SOLUTION INTRAVENOUS; SUBCUTANEOUS at 15:53

## 2021-02-16 RX ADMIN — INSULIN LISPRO 5 UNITS: 100 INJECTION, SOLUTION INTRAVENOUS; SUBCUTANEOUS at 08:15

## 2021-02-16 RX ADMIN — ENOXAPARIN SODIUM 90 MG: 100 INJECTION SUBCUTANEOUS at 12:30

## 2021-02-16 RX ADMIN — BUDESONIDE AND FORMOTEROL FUMARATE DIHYDRATE 2 PUFF: 160; 4.5 AEROSOL RESPIRATORY (INHALATION) at 19:22

## 2021-02-16 RX ADMIN — INSULIN LISPRO 5 UNITS: 100 INJECTION, SOLUTION INTRAVENOUS; SUBCUTANEOUS at 12:02

## 2021-02-16 RX ADMIN — MONTELUKAST SODIUM 10 MG: 10 TABLET ORAL at 23:25

## 2021-02-16 RX ADMIN — ALBUTEROL SULFATE 4 PUFF: 108 AEROSOL, METERED RESPIRATORY (INHALATION) at 16:51

## 2021-02-16 RX ADMIN — INSULIN LISPRO 7 UNITS: 100 INJECTION, SOLUTION INTRAVENOUS; SUBCUTANEOUS at 12:02

## 2021-02-16 RX ADMIN — METHYLPREDNISOLONE SODIUM SUCCINATE 40 MG: 125 INJECTION, POWDER, FOR SOLUTION INTRAMUSCULAR; INTRAVENOUS at 00:51

## 2021-02-16 RX ADMIN — GABAPENTIN 100 MG: 100 CAPSULE ORAL at 14:02

## 2021-02-16 RX ADMIN — GUAIFENESIN 600 MG: 600 TABLET, EXTENDED RELEASE ORAL at 08:17

## 2021-02-16 RX ADMIN — BUDESONIDE AND FORMOTEROL FUMARATE DIHYDRATE 2 PUFF: 160; 4.5 AEROSOL RESPIRATORY (INHALATION) at 07:50

## 2021-02-16 RX ADMIN — ALBUTEROL SULFATE 4 PUFF: 108 AEROSOL, METERED RESPIRATORY (INHALATION) at 08:00

## 2021-02-16 RX ADMIN — PIPERACILLIN AND TAZOBACTAM 3.38 G: 3; .375 INJECTION, POWDER, LYOPHILIZED, FOR SOLUTION INTRAVENOUS at 05:52

## 2021-02-16 RX ADMIN — ZINC SULFATE 220 MG (50 MG) CAPSULE 1 CAPSULE: CAPSULE at 08:17

## 2021-02-16 RX ADMIN — Medication 3000 UNITS: at 08:17

## 2021-02-16 RX ADMIN — FAMOTIDINE 20 MG: 20 TABLET ORAL at 08:17

## 2021-02-16 RX ADMIN — INSULIN LISPRO 2 UNITS: 100 INJECTION, SOLUTION INTRAVENOUS; SUBCUTANEOUS at 08:16

## 2021-02-16 RX ADMIN — GABAPENTIN 100 MG: 100 CAPSULE ORAL at 05:51

## 2021-02-16 RX ADMIN — INSULIN GLARGINE 75 UNITS: 100 INJECTION, SOLUTION SUBCUTANEOUS at 23:25

## 2021-02-16 RX ADMIN — ASPIRIN 81 MG: 81 TABLET, COATED ORAL at 08:17

## 2021-02-16 RX ADMIN — ALBUTEROL SULFATE 4 PUFF: 108 AEROSOL, METERED RESPIRATORY (INHALATION) at 19:22

## 2021-02-16 NOTE — ROUTINE PROCESS
Pt continues to request for food. Pt reinforced education for diet and med management. Monitor on. IV out. States does not want PICC, Dr. Draper Bruno notified. Voids in urinal. Been up to void.

## 2021-02-16 NOTE — PROGRESS NOTES
Visit attempted for patient in 2 acute care [place] [id number] during rounds. Per current COVID-19 isolation protocol in effect I provided silent support and prayer outside patient room. There were no family members nearby. Chaplains will follow up if further referrals are requested.     MARLA MaddoxDiv.

## 2021-02-16 NOTE — ROUTINE PROCESS
@ 2130 2/15/2021 RYAN Padron, NP notified of blood sugar 450, will increase sliding scale dosage. Resting quielty. No c/o pain. No dyspnea noted.

## 2021-02-16 NOTE — PROGRESS NOTES
Progress Note    Patient: Jayleen Taylor MRN: [id number]  SSN: [id number]    YOB: 1945  Age: 70 y.o. Sex: male      Admit Date: 2/13/2021    LOS: 3 days     Subjective:     Patient presents with shortness of breath, currently being treated for COPD exacerbation. Patient states he has had some cough. Remains on room air. Objective:     Vitals:    02/16/21 0442 02/16/21 0732 02/16/21 0750 02/16/21 1111   BP: (!) 141/77 135/80  126/78   Pulse: 77 75  88   Resp: 20 20  18   Temp: 97.8 °F (36.6 °C) 98 °F (36.7 °C)  98.4 °F (36.9 °C)   SpO2: 97% 97% 99% 97%   Weight:       Height:            Intake and Output:  Current Shift: 02/16 0701 - 02/16 1900  In: -   Out: 400 [Urine:400]  Last three shifts: 02/14 1901 - 02/16 0700  In: 364 [I.V.:364]  Out: 800 [Urine:800]    Physical Exam:   GENERAL: alert, cooperative, no distress, appears stated age  THROAT & NECK: normal and no erythema or exudates noted. LUNG end expiratory rhonchi present  HEART: regular rate and rhythm, S1, S2 normal, no murmur, click, rub or gallop  ABDOMEN: soft, non-tender. Bowel sounds normal. No masses,  no organomegaly  EXTREMITIES:  extremities normal, atraumatic, no cyanosis or edema  SKIN: no rash or abnormalities  NEUROLOGIC: AOx3. PSYCHIATRIC: non focal    Lab/Data Review: All lab results for the last 24 hours reviewed.      Recent Results (from the past 24 hour(s))   GLUCOSE, POC    Collection Time: 02/15/21  4:06 PM   Result Value Ref Range    Glucose (POC) 477 (H) 65 - 100 mg/dL    Performed by [facility] [place], POC    Collection Time: 02/15/21  9:29 PM   Result Value Ref Range    Glucose (POC) 450 (H) 65 - 100 mg/dL    Performed by Vanesa Miramontes    D DIMER    Collection Time: 02/16/21  5:41 AM   Result Value Ref Range    D-dimer >35.20 (H) 0.19 - 0.50 mg/L FEU   METABOLIC PANEL, BASIC    Collection Time: 02/16/21  5:41 AM   Result Value Ref Range    Sodium 138 136 - 145 mmol/L    Potassium 4.6 3.5 - 5.1 mmol/L Chloride 104 97 - 108 mmol/L    CO2 22 21 - 32 mmol/L    Anion gap 12 5 - 15 mmol/L    Glucose 160 (H) 65 - 100 mg/dL    BUN 65 (H) 6 - 20 mg/dL    Creatinine 2.72 (H) 0.70 - 1.30 mg/dL    BUN/Creatinine ratio 24 (H) 12 - 20      GFR est AA 28 (L) >60 ml/min/1.73m2    GFR est non-AA 23 (L) >60 ml/min/1.73m2    Calcium 8.1 (L) 8.5 - 10.1 mg/dL   GLUCOSE, POC    Collection Time: 02/16/21  8:04 AM   Result Value Ref Range    Glucose (POC) 148 (H) 65 - 100 mg/dL    Performed by [employer]    GLUCOSE, POC    Collection Time: 02/16/21 10:56 AM   Result Value Ref Range    Glucose (POC) 484 (H) 65 - 100 mg/dL    Performed by [employer]         Imaging:    No results found.        Assessment and Plan:     COVID-19 pneumonia  -Patient was tested positive on 2/9  -Chest x-ray this admission showing worsening bilateral airspace disease  -Currently not hypoxic and therefore does not meet criteria for remdesivir  -Continue vitamin C and zinc  -Empirically started on antibiotics    COPD exacerbation  -Continue steroid along with breathing treatment and bronchodilators  -Change Solu-Medrol to prednisone as patient lost IV access  -Currently not hypoxic    Abnormal D-dimer  -D-dimer elevated to more than 35  -Less likely PE due to patient not being hypoxic  -Elevated D-dimer likely from inflammatory response from COVID-19 infection  -Change anticoagulation to full dose    Abnormal renal function  -Patient with BIRD on CKD, baseline k creatinine around 2  -Neurology evaluated the patient  -Completed bicarb drip 1 bag    Hypertension  -Continue Norvasc and monitor blood pressure    Diabetes  -Elevated blood sugars on steroid  -Adjust Lantus to 75 units daily  -Start premeal insulin with lispro 5 units 3 times daily  -Continue insulin sliding scale coverage    Chronic pain  -Patient on methadone and tramadol as outpatient  -Holding pain medications for now  -Reduced Neurontin dose to 100 mg 3 times daily due to abnormal renal function    Anticipated disposition is likely in 24 to 48 hours pending progress.     Signed By: Michaela Fine MD     February 16, 2021

## 2021-02-17 VITALS
HEART RATE: 80 BPM | HEIGHT: 68 IN | WEIGHT: 200 LBS | TEMPERATURE: 97.8 F | BODY MASS INDEX: 30.31 KG/M2 | DIASTOLIC BLOOD PRESSURE: 84 MMHG | RESPIRATION RATE: 20 BRPM | SYSTOLIC BLOOD PRESSURE: 140 MMHG | OXYGEN SATURATION: 92 %

## 2021-02-17 LAB
ANION GAP SERPL CALC-SCNC: 10 MMOL/L (ref 5–15)
BUN SERPL-MCNC: 63 MG/DL (ref 6–20)
BUN/CREAT SERPL: 25 (ref 12–20)
CA-I BLD-MCNC: 8.6 MG/DL (ref 8.5–10.1)
CHLORIDE SERPL-SCNC: 107 MMOL/L (ref 97–108)
CO2 SERPL-SCNC: 22 MMOL/L (ref 21–32)
CREAT SERPL-MCNC: 2.55 MG/DL (ref 0.7–1.3)
GLUCOSE BLD STRIP.AUTO-MCNC: 101 MG/DL (ref 65–100)
GLUCOSE BLD STRIP.AUTO-MCNC: 135 MG/DL (ref 65–100)
GLUCOSE SERPL-MCNC: 159 MG/DL (ref 65–100)
PERFORMED BY, TECHID: ABNORMAL
PERFORMED BY, TECHID: ABNORMAL
POTASSIUM SERPL-SCNC: 4.8 MMOL/L (ref 3.5–5.1)
SODIUM SERPL-SCNC: 139 MMOL/L (ref 136–145)

## 2021-02-17 PROCEDURE — 94760 N-INVAS EAR/PLS OXIMETRY 1: CPT

## 2021-02-17 PROCEDURE — 97161 PT EVAL LOW COMPLEX 20 MIN: CPT

## 2021-02-17 PROCEDURE — 74011250637 HC RX REV CODE- 250/637: Performed by: HOSPITALIST

## 2021-02-17 PROCEDURE — 80048 BASIC METABOLIC PNL TOTAL CA: CPT

## 2021-02-17 PROCEDURE — 90471 IMMUNIZATION ADMIN: CPT

## 2021-02-17 PROCEDURE — 36415 COLL VENOUS BLD VENIPUNCTURE: CPT

## 2021-02-17 PROCEDURE — 74011250636 HC RX REV CODE- 250/636: Performed by: HOSPITALIST

## 2021-02-17 PROCEDURE — 74011636637 HC RX REV CODE- 636/637: Performed by: FAMILY MEDICINE

## 2021-02-17 PROCEDURE — 74011250636 HC RX REV CODE- 250/636: Performed by: FAMILY MEDICINE

## 2021-02-17 PROCEDURE — 94640 AIRWAY INHALATION TREATMENT: CPT

## 2021-02-17 PROCEDURE — 82962 GLUCOSE BLOOD TEST: CPT

## 2021-02-17 PROCEDURE — 74011250637 HC RX REV CODE- 250/637: Performed by: FAMILY MEDICINE

## 2021-02-17 PROCEDURE — 90694 VACC AIIV4 NO PRSRV 0.5ML IM: CPT | Performed by: HOSPITALIST

## 2021-02-17 RX ORDER — AMOXICILLIN AND CLAVULANATE POTASSIUM 875; 125 MG/1; MG/1
1 TABLET, FILM COATED ORAL 2 TIMES DAILY WITH MEALS
Qty: 14 TAB | Refills: 0 | Status: SHIPPED | OUTPATIENT
Start: 2021-02-17 | End: 2021-02-24

## 2021-02-17 RX ORDER — VITAMIN E 1000 UNIT
1000 CAPSULE ORAL 2 TIMES DAILY
Qty: 60 TAB | Refills: 0 | Status: SHIPPED | OUTPATIENT
Start: 2021-02-17 | End: 2021-03-22

## 2021-02-17 RX ORDER — PREDNISONE 20 MG/1
20 TABLET ORAL 2 TIMES DAILY WITH MEALS
Qty: 10 TAB | Refills: 0 | Status: SHIPPED | OUTPATIENT
Start: 2021-02-17 | End: 2021-02-22

## 2021-02-17 RX ORDER — ZINC SULFATE 50(220)MG
220 CAPSULE ORAL DAILY
Qty: 30 CAP | Refills: 0 | Status: SHIPPED | OUTPATIENT
Start: 2021-02-18 | End: 2021-03-22

## 2021-02-17 RX ORDER — AMLODIPINE BESYLATE 5 MG/1
5 TABLET ORAL DAILY
Qty: 30 TAB | Refills: 0 | Status: ON HOLD | OUTPATIENT
Start: 2021-02-18 | End: 2021-03-22

## 2021-02-17 RX ADMIN — BUDESONIDE AND FORMOTEROL FUMARATE DIHYDRATE 2 PUFF: 160; 4.5 AEROSOL RESPIRATORY (INHALATION) at 07:30

## 2021-02-17 RX ADMIN — ZINC SULFATE 220 MG (50 MG) CAPSULE 1 CAPSULE: CAPSULE at 08:01

## 2021-02-17 RX ADMIN — A/SINGAPORE/GP1908/2015 IVR-180 (AN A/MICHIGAN/45/2015 (H1N1)PDM09-LIKE VIRUS, A/HONG KONG/4801/2014, NYMC X-263B (H3N2) (AN A/HONG KONG/4801/2014-LIKE VIRUS), AND B/BRISBANE/60/2008, WILD TYPE (A B/BRISBANE/60/2008-LIKE VIRUS) 0.5 ML: 15; 15; 15 INJECTION, SUSPENSION INTRAMUSCULAR at 12:38

## 2021-02-17 RX ADMIN — ASPIRIN 81 MG: 81 TABLET, COATED ORAL at 08:01

## 2021-02-17 RX ADMIN — GABAPENTIN 100 MG: 100 CAPSULE ORAL at 13:25

## 2021-02-17 RX ADMIN — AMLODIPINE BESYLATE 5 MG: 5 TABLET ORAL at 08:01

## 2021-02-17 RX ADMIN — GUAIFENESIN 600 MG: 600 TABLET, EXTENDED RELEASE ORAL at 08:01

## 2021-02-17 RX ADMIN — INSULIN LISPRO 5 UNITS: 100 INJECTION, SOLUTION INTRAVENOUS; SUBCUTANEOUS at 07:54

## 2021-02-17 RX ADMIN — Medication 10 ML: at 08:04

## 2021-02-17 RX ADMIN — Medication 3000 UNITS: at 08:01

## 2021-02-17 RX ADMIN — INSULIN LISPRO 5 UNITS: 100 INJECTION, SOLUTION INTRAVENOUS; SUBCUTANEOUS at 12:38

## 2021-02-17 RX ADMIN — ALBUTEROL SULFATE 4 PUFF: 108 AEROSOL, METERED RESPIRATORY (INHALATION) at 08:00

## 2021-02-17 RX ADMIN — AMOXICILLIN AND CLAVULANATE POTASSIUM 1 TABLET: 875; 125 TABLET, FILM COATED ORAL at 07:54

## 2021-02-17 RX ADMIN — OXYCODONE HYDROCHLORIDE AND ACETAMINOPHEN 1000 MG: 500 TABLET ORAL at 08:01

## 2021-02-17 RX ADMIN — PREDNISONE 20 MG: 20 TABLET ORAL at 07:56

## 2021-02-17 RX ADMIN — ALBUTEROL SULFATE 4 PUFF: 108 AEROSOL, METERED RESPIRATORY (INHALATION) at 11:23

## 2021-02-17 RX ADMIN — FAMOTIDINE 20 MG: 20 TABLET ORAL at 08:01

## 2021-02-17 RX ADMIN — GABAPENTIN 100 MG: 100 CAPSULE ORAL at 07:55

## 2021-02-17 RX ADMIN — ENOXAPARIN SODIUM 90 MG: 100 INJECTION SUBCUTANEOUS at 12:39

## 2021-02-17 NOTE — ROUTINE PROCESS
Patient seen and assessed by the bedside, alert and oriented. Complains of pain in the legs. Provider notified and PRN Tramadol 50 mg as needed ordered orally.

## 2021-02-17 NOTE — DISCHARGE SUMMARY
Physician Discharge Summary     Patient ID:    Darlene Waller  [id number]  89 y.o.  1945    Admit date: 2/13/2021    Discharge date : 2/17/2021    Chronic Diagnoses:    Problem List as of 2/17/2021 Never Reviewed          Codes Class Noted - Resolved    [id number] ICD-10-CM: U07.1  ICD-9-CM: 079.89  2/13/2021 - Present        Dyspnea ICD-10-CM: R06.00  ICD-9-CM: 786.09  2/13/2021 - Present        COPD (chronic obstructive pulmonary disease) ([facility] ICD-10-CM: J44.9  ICD-9-CM: 641  2/13/2021 - Present        Positive D dimer ICD-10-CM: R79.89  ICD-9-CM: 790.92  2/13/2021 - Present        CKD (chronic kidney disease) ICD-10-CM: N18.9  ICD-9-CM: 585.9  2/13/2021 - Present          22    Final Diagnoses:   COVID-19 [U07.1]  COPD (chronic obstructive pulmonary disease) ([facility] [J44.9]  Acute on chronic kidney disease stage III      Reason for Hospitalization: Shortness of breath        Hospital Course:   Per H and P,\"75 y.o. male followed by Dr Latasha Durán in [place] and  has a past medical history of Asthma, B12 deficiency, CAD (coronary artery disease), Cataract, [facility] CKD (chronic kidney disease), Depression, Diabetes ([facility] DJD (degenerative joint disease), Heart attack ([facility] Hypertension, Long term prescription opiate use, Neuropathy, Rheumatoid arthritis ([facility] and Ulcer. And chronic pain on noted methadone comes in once again for sob. Patient was in ER on 2/9 and at that time the CXR showed pna and was sent on Azithromycin and prednisone. COVID testing at that time came back as positive and today patient states he had woorsening sob and nonproductive cough. Denies any fever and no noted fever or hypoxia on arrival but patient is noted on exam to be visibile sob. Patient has hx of tobacco use of 1/2 ppd for which he states he quit one month ago. No diagonosis of copd but states he has inhaler at home.  On evalution patient has noted ckd with creat noted of 2 in 2016. D dimer was elevated at 4.72 so lovenox 1mg /kg sq daily dosing cxr shows hyperinflation with diffuse worsening of bilateral multicentric airspace disease. Because of elevated D-dimer and worsening chest x-ray patient has increased risk of deteriorating symptoms so admit the patient to acute care for worsening pneumonia secondary to Covid and COPD exacerbation\"  He was treated with IV antibiotics and IV steroids and later switched to oral steroids and oral antibiotics. He is off nasal oxygen at this time and tolerating oral diets without difficulties. Deemed stable for discharge to home with outpatient follow-up                 Discharge Medications:   Current Discharge Medication List      START taking these medications    Details   amLODIPine (NORVASC) 5 mg tablet Take 1 Tab by mouth daily for 30 days. Qty: 30 Tab, Refills: 0      amoxicillin-clavulanate (AUGMENTIN) 875-125 mg per tablet Take 1 Tab by mouth two (2) times daily (with meals) for 7 days. Qty: 14 Tab, Refills: 0      ascorbic acid, vitamin C, (VITAMIN C) 1,000 mg tablet Take 1 Tab by mouth two (2) times a day for 30 days. Qty: 60 Tab, Refills: 0      zinc sulfate (ZINCATE) 220 (50) mg capsule Take 1 Cap by mouth daily for 30 days. Qty: 30 Cap, Refills: 0         CONTINUE these medications which have CHANGED    Details   predniSONE (DELTASONE) 20 mg tablet Take 20 mg by mouth two (2) times daily (with meals) for 5 days. Qty: 10 Tab, Refills: 0         CONTINUE these medications which have NOT CHANGED    Details   omeprazole (PRILOSEC) 40 mg capsule Take 40 mg by mouth daily. traMADol (ULTRAM) 50 mg tablet Take 50 mg by mouth every six (6) hours as needed for Pain. insulin glargine (LANTUS) 100 unit/mL injection 60 Units by SubCUTAneous route nightly.       insulin aspart (NOVOLOG) 100 unit/mL injection 26 Units by SubCUTAneous route Every morning.      gabapentin (NEURONTIN) 300 mg capsule Take 300 mg by mouth three (3) times daily.      cholecalciferol (VITAMIN D3) 1,000 unit cap Take 1,000 Units by mouth daily. aspirin delayed-release 81 mg tablet Take 81 mg by mouth daily. methadone (DOLOPHINE) 10 mg tablet Take 10 mg by mouth four (4) times daily. ondansetron (ZOFRAN ODT) 4 mg disintegrating tablet Take 1 Tab by mouth every eight (8) hours as needed for Nausea. Qty: 12 Tab, Refills: 0         STOP taking these medications       azithromycin (Zithromax TRI-MERCEDEZ) 500 mg tab Comments:   Reason for Stopping: Follow up Care:    1. Other, MD Carrie in 1-2 weeks. Please call to set up an appointment shortly after discharge. Diet:  Cardiac Diet    Disposition:  Home with     Advanced Directive:   FULL x   DNR      Discharge Exam:  Visit Vitals  BP (!) 140/84   Pulse 80   Temp 97.8 °F (36.6 °C)   Resp 20   Ht 5' 8\" (1.727 m)   Wt 90.7 kg (200 lb)   SpO2 97%   BMI 30.41 kg/m²      O2 Device: Room air    Temp (24hrs), Av.3 °F (36.8 °C), Min:97.8 °F (36.6 °C), Max:98.8 °F (37.1 °C)    No intake/output data recorded. 02/15 1901 -  0700  In: 364 [I.V.:364]  Out: 1700 [Urine:1700]    General:  Alert, cooperative, no distress, appears stated age. Lungs:   Clear to auscultation bilaterally. Chest wall:  No tenderness or deformity. Heart:  Regular rate and rhythm, S1, S2 normal, no murmur, click, rub or gallop. Abdomen:   Soft, non-tender. Bowel sounds normal. No masses,  No organomegaly. Extremities: Extremities normal, atraumatic, no cyanosis or edema. Pulses: 2+ and symmetric all extremities. Skin: Skin color, texture, turgor normal. No rashes or lesions   Neurologic: CNII-XII intact. No gross sensory or motor deficits         CONSULTATIONS: Nephrology    Significant Diagnostic Studies:   2021: BUN 55 mg/dL* (Ref range: 6 - 20 mg/dL); Calcium 8.8 mg/dL (Ref range: 8.5 - 10.1 mg/dL); CO2 24 mmol/L (Ref range: 21 - 32 mmol/L);  Creatinine 2.63 mg/dL* (Ref range: 0.70 - 1.30 mg/dL); Glucose 63 mg/dL* (Ref range: 65 - 100 mg/dL); HCT 35.0 %* (Ref range: 41 - 53 %); HGB 11.6 g/dL* (Ref range: 13.5 - 17.5 g/dL); Potassium 3.6 mmol/L (Ref range: 3.5 - 5.1 mmol/L); Sodium 143 mmol/L (Ref range: 136 - 145 mmol/L)  2/14/2021: BUN 49 mg/dL* (Ref range: 6 - 20 mg/dL); Calcium 8.6 mg/dL (Ref range: 8.5 - 10.1 mg/dL); CO2 22 mmol/L (Ref range: 21 - 32 mmol/L); Creatinine 2.36 mg/dL* (Ref range: 0.70 - 1.30 mg/dL); Glucose 173 mg/dL* (Ref range: 65 - 100 mg/dL); HCT 33.9 %* (Ref range: 41 - 53 %); HGB 11.0 g/dL* (Ref range: 13.5 - 17.5 g/dL); Potassium 4.7 mmol/L (Ref range: 3.5 - 5.1 mmol/L); Sodium 140 mmol/L (Ref range: 136 - 145 mmol/L)  Recent Labs     02/15/21  0541   WBC 8.6   HGB 10.3*   HCT 30.7*        Recent Labs     02/17/21  0600 02/16/21  0541 02/15/21  0541    138 132*   K 4.8 4.6 4.9    104 102   CO2 22 22 19*   BUN 63* 65* 58*   CREA 2.55* 2.72* 2.83*   * 160* 386*   CA 8.6 8.1* 8.1*   PHOS  --   --  3.9     Recent Labs     02/15/21  0541   ALB 1.9*     No results for input(s): INR, PTP, APTT, INREXT in the last 72 hours. No results for input(s): FE, TIBC, PSAT, FERR in the last 72 hours. No results for input(s): PH, PCO2, PO2 in the last 72 hours. No results for input(s): CPK, CKMB in the last 72 hours.     No lab exists for component: TROPONINI  Lab Results   Component Value Date/Time    Glucose (POC) 135 (H) 02/17/2021 07:21 AM    Glucose (POC) 399 (H) 02/16/2021 09:10 PM    Glucose (POC) 331 (H) 02/16/2021 03:41 PM    Glucose (POC) 484 (H) 02/16/2021 10:56 AM    Glucose (POC) 148 (H) 02/16/2021 08:04 AM       Total Time: 35 minutes    Signed:  Jarrett Palacios MD  2/17/2021  10:55 AM

## 2021-02-17 NOTE — PROGRESS NOTES
Problem: Falls - Risk of  Goal: *Absence of Falls  Description: Document Irina Morales Fall Risk and appropriate interventions in the flowsheet. 2/17/2021 1216 by Haider Gloria RN  Outcome: Resolved/Met  Note: Fall Risk Interventions:  Mobility Interventions: Patient to call before getting OOB         Medication Interventions: Patient to call before getting OOB, Teach patient to arise slowly    Elimination Interventions: Call light in reach           2/17/2021 1002 by Haider Gloria RN  Outcome: Progressing Towards Goal  Note: Fall Risk Interventions:  Mobility Interventions: Patient to call before getting OOB         Medication Interventions: Patient to call before getting OOB, Teach patient to arise slowly    Elimination Interventions: Call light in reach              Problem: Patient Education: Go to Patient Education Activity  Goal: Patient/Family Education  2/17/2021 1216 by Haider Gloria RN  Outcome: Resolved/Met  2/17/2021 1002 by Haider Gloria RN  Outcome: Progressing Towards Goal     Problem: Diabetes Self-Management  Goal: *Disease process and treatment process  Description: Define diabetes and identify own type of diabetes; list 3 options for treating diabetes. 2/17/2021 1216 by Haider Gloria RN  Outcome: Resolved/Met  2/17/2021 1002 by Haider Gloria RN  Outcome: Progressing Towards Goal  Goal: *Incorporating nutritional management into lifestyle  Description: Describe effect of type, amount and timing of food on blood glucose; list 3 methods for planning meals. 2/17/2021 1216 by Haider Gloria RN  Outcome: Resolved/Met  2/17/2021 1002 by Haider Gloria RN  Outcome: Progressing Towards Goal  Goal: *Incorporating physical activity into lifestyle  Description: State effect of exercise on blood glucose levels.   2/17/2021 1216 by Haider Gloria RN  Outcome: Resolved/Met  2/17/2021 1002 by Haider Gloria RN  Outcome: Progressing Towards Goal  Goal: *Developing strategies to promote health/change behavior  Description: Define the ABC's of diabetes; identify appropriate screenings, schedule and personal plan for screenings. 2/17/2021 1216 by Claudia Sesay RN  Outcome: Resolved/Met  2/17/2021 1002 by Claudia Sesay RN  Outcome: Progressing Towards Goal  Goal: *Using medications safely  Description: State effect of diabetes medications on diabetes; name diabetes medication taking, action and side effects. 2/17/2021 1216 by Claudia Sesay RN  Outcome: Resolved/Met  2/17/2021 1002 by Claudia Sesay RN  Outcome: Progressing Towards Goal  Goal: *Monitoring blood glucose, interpreting and using results  Description: Identify recommended blood glucose targets  and personal targets. 2/17/2021 1216 by Claudia Sesay RN  Outcome: Resolved/Met  2/17/2021 1002 by Claudia Sesay RN  Outcome: Progressing Towards Goal  Goal: *Prevention, detection, treatment of acute complications  Description: List symptoms of hyper- and hypoglycemia; describe how to treat low blood sugar and actions for lowering  high blood glucose level. 2/17/2021 1216 by Claudia Sesay RN  Outcome: Resolved/Met  2/17/2021 1002 by Claudia Sesay RN  Outcome: Progressing Towards Goal  Goal: *Prevention, detection and treatment of chronic complications  Description: Define the natural course of diabetes and describe the relationship of blood glucose levels to long term complications of diabetes.   2/17/2021 1216 by Claudia Sesay RN  Outcome: Resolved/Met  2/17/2021 1002 by Claudia Sesay RN  Outcome: Progressing Towards Goal  Goal: *Developing strategies to address psychosocial issues  Description: Describe feelings about living with diabetes; identify support needed and support network  2/17/2021 1216 by Claudia Sesay RN  Outcome: Resolved/Met  2/17/2021 1002 by Claudia Sesay RN  Outcome: Progressing Towards Goal  Goal: *Insulin pump training  2/17/2021 1216 by Haider Gloria RN  Outcome: Resolved/Met  2/17/2021 1002 by Haider Gloria RN  Outcome: Progressing Towards Goal  Goal: *Sick day guidelines  2/17/2021 1216 by Haider Gloria RN  Outcome: Resolved/Met  2/17/2021 1002 by Haider Gloria RN  Outcome: Progressing Towards Goal  Goal: *Patient Specific Goal (EDIT GOAL, INSERT TEXT)  2/17/2021 1216 by Haider Gloria RN  Outcome: Resolved/Met  2/17/2021 1002 by Haider Gloria RN  Outcome: Progressing Towards Goal     Problem: Patient Education: Go to Patient Education Activity  Goal: Patient/Family Education  2/17/2021 1216 by Haider Gloria RN  Outcome: Resolved/Met  2/17/2021 1002 by Haider Gloria RN  Outcome: Progressing Towards Goal

## 2021-02-17 NOTE — ROUTINE PROCESS
Current blood sugar check is 399, NP Suha notified and 8 units of humalog verbally ordered. Provider made aware of nighttime dose of lantus 75 units.

## 2021-02-17 NOTE — PROGRESS NOTES
PHYSICAL THERAPY EVALUATION/DISCHARGE  Patient: Johnathan Bosworth (61 y.o. male)  Date: 2/17/2021  Primary Diagnosis: COVID-19 [U07.1]  COPD (chronic obstructive pulmonary disease) ([facility] [J44.9]       Precautions: none         ASSESSMENT  Based on the objective data described below, the patient presents with no current deficits beyond his baseline status at this time. Other factors to consider for discharge: none     Further skilled acute physical therapy is not indicated at this time. PLAN :  Recommendation for discharge: (in order for the patient to meet his/her long term goals)  No skilled physical therapy/ follow up rehabilitation needs identified at this time. This discharge recommendation:  Has been made in collaboration with the attending provider and/or case management    IF patient discharges home will need the following DME: patient owns DME required for discharge       SUBJECTIVE:   Patient stated I am ready to get out of here.     OBJECTIVE DATA SUMMARY:   HISTORY:    Past Medical History:   Diagnosis Date    Asthma     B12 deficiency     CAD (coronary artery disease)     Cataract     Chronic obstructive pulmonary disease (HCC)     CKD (chronic kidney disease)     Depression     [facility]     DJD (degenerative joint disease)     Heart attack ([facility]     Hypertension     Long term prescription opiate use     Neuropathy     Rheumatoid arthritis (HCC)     Ulcer      Past Surgical History:   Procedure Laterality Date    HX ANGIOPLASTY      HX HEENT      HX ORTHOPAEDIC      [place] CARDIAC SURG PROCEDURE UNLIST      RCA stent       Prior level of function: modified independent with quad cane and rolling walker  Personal factors and/or comorbidities impacting plan of care: none    Home Situation  Home Environment: Private residence  # Steps to Enter: 3  Rails to Enter: Yes  Hand Rails : Bilateral  Wheelchair Ramp: No  One/Two Story Residence: One story  Living Alone: No  Support Systems: Family member(s)(lives with daughter)  Patient Expects to be Discharged to[de-identified] Private residence  Current DME Used/Available at Home: Ayde , rolling, Cane, quad    EXAMINATION/PRESENTATION/DECISION MAKING:   Critical Behavior:  Neurologic State: Alert  Orientation Level: Oriented X4  Cognition: Follows commands  Safety/Judgement: Awareness of environment  Hearing: Auditory  Auditory Impairment: None  Range Of Motion:  AROM: Within functional limits                       Strength:    Strength:  Within functional limits                    Tone & Sensation:                                  Coordination:     Vision:      Functional Mobility:  Bed Mobility:  Rolling: Independent  Supine to Sit: Independent  Sit to Supine: Independent  Scooting: Independent  Transfers:  Sit to Stand: Modified independent  Stand to Sit: Modified independent  Stand Pivot Transfers: Modified independent     Bed to Chair: Modified independent              Balance:   Sitting: Intact  Standing: Intact  Ambulation/Gait Training:  Distance (ft): 60 Feet (ft)  Assistive Device: Walker, rolling  Ambulation - Level of Assistance: Modified independent     Gait Description (WDL): Exceptions to WDL           Base of Support: Widened     Speed/Court: Accelerated                        Stairs:     Stairs - Level of Assistance: Modified independent(simulated 3 steps in room)   Rail Use: None       Physical Therapy Evaluation Charge Determination   History Examination Presentation Decision-Making   LOW Complexity : Zero comorbidities / personal factors that will impact the outcome / POC LOW Complexity : 1-2 Standardized tests and measures addressing body structure, function, activity limitation and / or participation in recreation  LOW Complexity : Stable, uncomplicated  LOW Complexity      Based on the above components, the patient evaluation is determined to be of the following complexity level: LOW     Pain Ratin/10    Activity Tolerance: Good, pt did verbalize difficulty catching his breath with exertion but never appeared physically labored      After treatment patient left in no apparent distress:   Sitting in chair and Call bell within reach    COMMUNICATION/EDUCATION:   The patients plan of care was discussed with: Physician and Case management. Fall prevention education was provided and the patient/caregiver indicated understanding.     Thank you for this referral.  Jennifer Montoya, PT   Time Calculation: 15 mins

## 2021-02-17 NOTE — PROGRESS NOTES
Problem: Falls - Risk of  Goal: *Absence of Falls  Description: Document Glenda Lopez Fall Risk and appropriate interventions in the flowsheet. Outcome: Progressing Towards Goal  Note: Fall Risk Interventions:  Mobility Interventions: Patient to call before getting OOB         Medication Interventions: Patient to call before getting OOB, Teach patient to arise slowly    Elimination Interventions: Call light in reach              Problem: Patient Education: Go to Patient Education Activity  Goal: Patient/Family Education  Outcome: Progressing Towards Goal     Problem: Diabetes Self-Management  Goal: *Disease process and treatment process  Description: Define diabetes and identify own type of diabetes; list 3 options for treating diabetes. Outcome: Progressing Towards Goal  Goal: *Incorporating nutritional management into lifestyle  Description: Describe effect of type, amount and timing of food on blood glucose; list 3 methods for planning meals. Outcome: Progressing Towards Goal  Goal: *Incorporating physical activity into lifestyle  Description: State effect of exercise on blood glucose levels. Outcome: Progressing Towards Goal  Goal: *Developing strategies to promote health/change behavior  Description: Define the ABC's of diabetes; identify appropriate screenings, schedule and personal plan for screenings. Outcome: Progressing Towards Goal  Goal: *Using medications safely  Description: State effect of diabetes medications on diabetes; name diabetes medication taking, action and side effects. Outcome: Progressing Towards Goal  Goal: *Monitoring blood glucose, interpreting and using results  Description: Identify recommended blood glucose targets  and personal targets.   Outcome: Progressing Towards Goal  Goal: *Prevention, detection, treatment of acute complications  Description: List symptoms of hyper- and hypoglycemia; describe how to treat low blood sugar and actions for lowering  high blood glucose level.  Outcome: Progressing Towards Goal  Goal: *Prevention, detection and treatment of chronic complications  Description: Define the natural course of diabetes and describe the relationship of blood glucose levels to long term complications of diabetes.   Outcome: Progressing Towards Goal  Goal: *Developing strategies to address psychosocial issues  Description: Describe feelings about living with diabetes; identify support needed and support network  Outcome: Progressing Towards Goal  Goal: *Insulin pump training  Outcome: Progressing Towards Goal  Goal: *Sick day guidelines  Outcome: Progressing Towards Goal  Goal: *Patient Specific Goal (EDIT GOAL, INSERT TEXT)  Outcome: Progressing Towards Goal     Problem: Patient Education: Go to Patient Education Activity  Goal: Patient/Family Education  Outcome: Progressing Towards Goal

## 2021-02-18 LAB
ATRIAL RATE: 92 BPM
CALCULATED P AXIS, ECG09: 57 DEGREES
CALCULATED R AXIS, ECG10: -40 DEGREES
CALCULATED T AXIS, ECG11: -23 DEGREES
DIAGNOSIS, 93000: NORMAL
P-R INTERVAL, ECG05: 130 MS
Q-T INTERVAL, ECG07: 345 MS
QRS DURATION, ECG06: 77 MS
QTC CALCULATION (BEZET), ECG08: 427 MS
VENTRICULAR RATE, ECG03: 92 BPM

## 2021-02-19 ENCOUNTER — PATIENT OUTREACH (OUTPATIENT)
Dept: CASE MANAGEMENT | Age: 76
End: 2021-02-19

## 2021-02-19 NOTE — PROGRESS NOTES
Called pt to follow up on hospital visit to [facility] discharged on 2/17/2021. Unable to LVM as VM is full.

## 2021-02-22 ENCOUNTER — PATIENT OUTREACH (OUTPATIENT)
Dept: CASE MANAGEMENT | Age: 76
End: 2021-02-22

## 2021-02-22 NOTE — PROGRESS NOTES
Patient contacted regarding COVID-19 risk and screening. Discussed COVID-19 related testing which was available at this time. Test results were positive. Patient informed of results, if available? yes     Care Transition Nurse/ Ambulatory Care Manager/ LPN Care Coordinator contacted the patient by telephone to perform follow-up assessment. Verified name and  with patient as identifiers. Patient has following risk factors of: diabetes and ED visit and hospital visit. Symptoms reviewed with patient who verbalized the following symptoms: no new symptoms and no worsening symptoms. Due to no new or worsening symptoms encounter was not routed to provider for escalation. Education provided regarding infection prevention, and signs and symptoms of COVID-19 and when to seek medical attention with patient who verbalized understanding. Discussed exposure protocols and quarantine from [address] you at higher risk for severe illness  and given an opportunity for questions and concerns. The patient agrees to contact the COVID-19 hotline [phone] or PCP office for questions related to their healthcare. CTN/ACM/LPN provided contact information for future reference. From CDC: Are you at higher risk for severe illness?  Wash your hands often.  Avoid close contact (6 feet, which is about two arm lengths) with people who are sick.  Put distance between yourself and other people if COVID-19 is spreading in your community.  Clean and disinfect frequently touched surfaces.  Avoid all cruise travel and non-essential air travel.  Call your healthcare professional if you have concerns about COVID-19 and your underlying condition or if you are sick. For more information on steps you can take to protect yourself, see CDC's How to [place] for follow-up call in 1-2 days based on severity of symptoms and risk factors.     Pt reports that he has hospital prescribed medications and is continuing to use the pulse oximeter. Pt reports that his hands and feet remain cold and reports that this is chronic pain in hands. Pt reports that cough has improved. Pt is trying to warm hands by rubbing them together. Pt reports that he cannot get a reading and is unable to wash hands. Unable to get a reading. Pt is agreeable to a call tomorrow and states understanding to call for help if pulse ox is and remains in 80s%.

## 2021-02-23 ENCOUNTER — PATIENT OUTREACH (OUTPATIENT)
Dept: CASE MANAGEMENT | Age: 76
End: 2021-02-23

## 2021-02-23 NOTE — PROGRESS NOTES
Called pt to follow up on pulse oximeter readings. Pt verified name and . Pt reports pulse oximeter reading at 96% last night. Pt reports that he has a PCP appt tomorrow and his daughter is taking him. Pt reports that he needs his inhaler refilled and that he is using the incentive spirometer. Pt states that he is not coughing as much but continues to feel weak. Pt is agreeable to a follow up call on Friday.

## 2021-02-26 ENCOUNTER — PATIENT OUTREACH (OUTPATIENT)
Dept: CASE MANAGEMENT | Age: 76
End: 2021-02-26

## 2021-02-26 NOTE — PROGRESS NOTES
Patient resolved from [address] Transitions episode on 2/26/2021  Discussed COVID-19 related testing which was available at this time. Test results were positive. Patient informed of results, if available? Pt tested COVID19 positive on 2/10/2021. Patient/family has been provided the following resources and education related to COVID-19:                         Signs, symptoms and red flags related to COVID-19            CDC exposure and quarantine guidelines            [facility] exposure contact - [phone]            Contact for their local Department of Health                 Patient currently reports that the following symptoms have improved:  no new symptoms, no worsening symptoms and Pt reports that he is using pulse oximeter and readings vary from 93% to 96%. Pt also reports using an incentive spiromter. Pt states that he has a follow up appt with PCP next week and his daughter will take him. .    No further outreach scheduled with this CTN/ACM/LPN/HC/ MA. Episode of Care resolved. Patient has this CTN/ACM/LPN/HC/MA contact information if future needs arise.

## 2021-03-02 NOTE — PROGRESS NOTES
CM has attempted to set up [facility] services for patient. Spoke to patient regarding home health. However, he wished for CM to speak with daughter. In speaking with daughter, CM was informed that patient had been receiving personal care and home health services through [facility]. Multiple unsuccessful attempts made to contact [employer]. Requested call back but have not received call. Called patient again today for new contact number for [employer]. Patient requested CM call daughter. Attempted to call daughter but did not get answer and was unable to leave voicemail. Case discussed with [facility].

## 2021-03-11 ENCOUNTER — HOSPITAL ENCOUNTER (EMERGENCY)
Age: 76
Discharge: HOME | End: 2021-03-11
Attending: EMERGENCY MEDICINE
Payer: MEDICARE

## 2021-03-11 ENCOUNTER — APPOINTMENT (OUTPATIENT)
Dept: CT IMAGING | Age: 76
End: 2021-03-11
Attending: EMERGENCY MEDICINE
Payer: MEDICARE

## 2021-03-11 ENCOUNTER — APPOINTMENT (OUTPATIENT)
Dept: GENERAL RADIOLOGY | Age: 76
End: 2021-03-11
Attending: EMERGENCY MEDICINE
Payer: MEDICARE

## 2021-03-11 DIAGNOSIS — J12.82 PNEUMONIA DUE TO COVID-19 VIRUS: Primary | ICD-10-CM

## 2021-03-11 DIAGNOSIS — U07.1 PNEUMONIA DUE TO COVID-19 VIRUS: Primary | ICD-10-CM

## 2021-03-11 LAB
ALBUMIN SERPL-MCNC: 2.6 G/DL (ref 3.5–5)
ALBUMIN/GLOB SERPL: 0.6 {RATIO} (ref 1.1–2.2)
ALP SERPL-CCNC: 83 U/L (ref 45–117)
ALT SERPL-CCNC: 31 U/L (ref 12–78)
ANION GAP SERPL CALC-SCNC: 9 MMOL/L (ref 5–15)
ARTERIAL PATENCY WRIST A: ABNORMAL
AST SERPL W P-5'-P-CCNC: 21 U/L (ref 15–37)
BASE DEFICIT BLDA-SCNC: 3.2 MMOL/L (ref 0–2)
BASOPHILS # BLD: 0.1 K/UL (ref 0–0.2)
BASOPHILS NFR BLD: 1 % (ref 0–2.5)
BDY SITE: ABNORMAL
BILIRUB SERPL-MCNC: 0.2 MG/DL (ref 0.2–1)
BNP SERPL-MCNC: 263 PG/ML
BUN SERPL-MCNC: 54 MG/DL (ref 6–20)
BUN/CREAT SERPL: 22 (ref 12–20)
CA-I BLD-MCNC: 8.4 MG/DL (ref 8.5–10.1)
CHLORIDE SERPL-SCNC: 102 MMOL/L (ref 97–108)
CO2 SERPL-SCNC: 27 MMOL/L (ref 21–32)
COHGB MFR BLD: 0.1 % (ref 0–5)
CREAT SERPL-MCNC: 2.42 MG/DL (ref 0.7–1.3)
EOSINOPHIL # BLD: 0 K/UL (ref 0–0.7)
EOSINOPHIL NFR BLD: 0 % (ref 0.9–2.9)
ERYTHROCYTE [DISTWIDTH] IN BLOOD BY AUTOMATED COUNT: 17.3 % (ref 11.5–14.5)
FIO2 ON VENT: 24 %
GLOBULIN SER CALC-MCNC: 4.7 G/DL (ref 2–4)
GLUCOSE SERPL-MCNC: 253 MG/DL (ref 65–100)
HCO3 BLDA-SCNC: 20 MMOL/L (ref 22–26)
HCT VFR BLD AUTO: 27.8 % (ref 41–53)
HGB BLD OXIMETRY-MCNC: 9.5 G/DL (ref 13.5–17.5)
HGB BLD-MCNC: 9.1 G/DL (ref 13.5–17.5)
INR PPP: 1 (ref 0.9–1.1)
LYMPHOCYTES # BLD: 0.5 K/UL (ref 1–4.8)
LYMPHOCYTES NFR BLD: 8 % (ref 20.5–51.1)
MAGNESIUM SERPL-MCNC: 2.1 MG/DL (ref 1.6–2.4)
MCH RBC QN AUTO: 31.8 PG (ref 31–34)
MCHC RBC AUTO-ENTMCNC: 32.8 G/DL (ref 31–36)
MCV RBC AUTO: 97 FL (ref 80–100)
METHGB MFR BLD: 0.3 % (ref 0–5)
MONOCYTES # BLD: 0.3 K/UL (ref 0.2–2.4)
MONOCYTES NFR BLD: 5 % (ref 1.7–9.3)
NEUTS SEG # BLD: 5.6 K/UL (ref 1.8–7.7)
NEUTS SEG NFR BLD: 86 % (ref 42–75)
NRBC # BLD: 0 K/UL
NRBC BLD-RTO: 0 PER 100 WBC
OXYHGB MFR BLD: 94.6 % (ref 95–100)
PCO2 BLDA: 28 MMHG (ref 35–45)
PH BLDA: 7.47 [PH] (ref 7.35–7.45)
PLATELET # BLD AUTO: 450 K/UL
PMV BLD AUTO: 8.1 FL (ref 6.5–11.5)
PO2 BLDA: 67 MMHG (ref 70–100)
POTASSIUM SERPL-SCNC: 4.4 MMOL/L (ref 3.5–5.1)
PROT SERPL-MCNC: 7.3 G/DL (ref 6.4–8.2)
PROTHROMBIN TIME: 9.9 SEC (ref 9–11.1)
RBC # BLD AUTO: 2.87 M/UL (ref 4.5–5.9)
SAO2% DEVICE SAO2% SENSOR NAME: ABNORMAL
SODIUM SERPL-SCNC: 138 MMOL/L (ref 136–145)
SPECIMEN SITE: ABNORMAL
TROPONIN I SERPL-MCNC: <0.05 NG/ML
WBC # BLD AUTO: 6.5 K/UL (ref 4.4–11.3)

## 2021-03-11 PROCEDURE — 83880 ASSAY OF NATRIURETIC PEPTIDE: CPT

## 2021-03-11 PROCEDURE — 74011250636 HC RX REV CODE- 250/636: Performed by: EMERGENCY MEDICINE

## 2021-03-11 PROCEDURE — 71250 CT THORAX DX C-: CPT

## 2021-03-11 PROCEDURE — 84484 ASSAY OF TROPONIN QUANT: CPT

## 2021-03-11 PROCEDURE — 94640 AIRWAY INHALATION TREATMENT: CPT

## 2021-03-11 PROCEDURE — 71045 X-RAY EXAM CHEST 1 VIEW: CPT

## 2021-03-11 PROCEDURE — 85610 PROTHROMBIN TIME: CPT

## 2021-03-11 PROCEDURE — 96374 THER/PROPH/DIAG INJ IV PUSH: CPT

## 2021-03-11 PROCEDURE — 83735 ASSAY OF MAGNESIUM: CPT

## 2021-03-11 PROCEDURE — 85025 COMPLETE CBC W/AUTO DIFF WBC: CPT

## 2021-03-11 PROCEDURE — 36600 WITHDRAWAL OF ARTERIAL BLOOD: CPT

## 2021-03-11 PROCEDURE — 82803 BLOOD GASES ANY COMBINATION: CPT

## 2021-03-11 PROCEDURE — 80053 COMPREHEN METABOLIC PANEL: CPT

## 2021-03-11 PROCEDURE — 93005 ELECTROCARDIOGRAM TRACING: CPT

## 2021-03-11 PROCEDURE — 36415 COLL VENOUS BLD VENIPUNCTURE: CPT

## 2021-03-11 PROCEDURE — 96375 TX/PRO/DX INJ NEW DRUG ADDON: CPT

## 2021-03-11 PROCEDURE — 99285 EMERGENCY DEPT VISIT HI MDM: CPT

## 2021-03-11 PROCEDURE — 74011250637 HC RX REV CODE- 250/637: Performed by: EMERGENCY MEDICINE

## 2021-03-11 RX ORDER — ALBUTEROL SULFATE 90 UG/1
AEROSOL, METERED RESPIRATORY (INHALATION)
Status: ON HOLD | COMMUNITY
Start: 2021-03-04 | End: 2021-03-22 | Stop reason: SDUPTHER

## 2021-03-11 RX ORDER — ATORVASTATIN CALCIUM 10 MG/1
10 TABLET, FILM COATED ORAL AT BEDTIME
COMMUNITY

## 2021-03-11 RX ORDER — KETOROLAC TROMETHAMINE 30 MG/ML
60 INJECTION, SOLUTION INTRAMUSCULAR; INTRAVENOUS
Status: DISCONTINUED | OUTPATIENT
Start: 2021-03-11 | End: 2021-03-11

## 2021-03-11 RX ORDER — LOSARTAN POTASSIUM 50 MG/1
TABLET ORAL
COMMUNITY
Start: 2020-12-24

## 2021-03-11 RX ORDER — AMOXICILLIN AND CLAVULANATE POTASSIUM 875; 125 MG/1; MG/1
1 TABLET, FILM COATED ORAL
Status: COMPLETED | OUTPATIENT
Start: 2021-03-11 | End: 2021-03-11

## 2021-03-11 RX ORDER — INSULIN LISPRO 100 [IU]/ML
22 INJECTION, SOLUTION INTRAVENOUS; SUBCUTANEOUS
COMMUNITY
Start: 2021-03-04

## 2021-03-11 RX ORDER — ALBUTEROL SULFATE 90 UG/1
8 AEROSOL, METERED RESPIRATORY (INHALATION) ONCE
Status: COMPLETED | OUTPATIENT
Start: 2021-03-11 | End: 2021-03-11

## 2021-03-11 RX ORDER — KETOROLAC TROMETHAMINE 30 MG/ML
30 INJECTION, SOLUTION INTRAMUSCULAR; INTRAVENOUS
Status: COMPLETED | OUTPATIENT
Start: 2021-03-11 | End: 2021-03-11

## 2021-03-11 RX ORDER — INSULIN LISPRO 100 [IU]/ML
INJECTION, SOLUTION INTRAVENOUS; SUBCUTANEOUS
COMMUNITY
Start: 2021-02-05

## 2021-03-11 RX ORDER — INSULIN LISPRO 100 [IU]/ML
INJECTION, SOLUTION INTRAVENOUS; SUBCUTANEOUS
COMMUNITY

## 2021-03-11 RX ORDER — AMOXICILLIN AND CLAVULANATE POTASSIUM 875; 125 MG/1; MG/1
1 TABLET, FILM COATED ORAL 2 TIMES DAILY
Qty: 20 TAB | Refills: 0 | Status: SHIPPED | OUTPATIENT
Start: 2021-03-11 | End: 2021-03-22

## 2021-03-11 RX ORDER — CETIRIZINE HYDROCHLORIDE, PSEUDOEPHEDRINE HYDROCHLORIDE 5; 120 MG/1; MG/1
1 TABLET, FILM COATED, EXTENDED RELEASE ORAL
COMMUNITY
Start: 2021-01-20

## 2021-03-11 RX ORDER — ACETAMINOPHEN 500 MG
TABLET ORAL
COMMUNITY
Start: 2020-12-07

## 2021-03-11 RX ADMIN — METHYLPREDNISOLONE SODIUM SUCCINATE 125 MG: 125 INJECTION, POWDER, FOR SOLUTION INTRAMUSCULAR; INTRAVENOUS at 16:37

## 2021-03-11 RX ADMIN — ALBUTEROL SULFATE 8 PUFF: 108 AEROSOL, METERED RESPIRATORY (INHALATION) at 15:53

## 2021-03-11 RX ADMIN — KETOROLAC TROMETHAMINE 30 MG: 30 INJECTION, SOLUTION INTRAMUSCULAR at 16:36

## 2021-03-11 RX ADMIN — AMOXICILLIN AND CLAVULANATE POTASSIUM 1 TABLET: 875; 125 TABLET, FILM COATED ORAL at 20:43

## 2021-03-11 NOTE — ED TRIAGE NOTES
Pt reports testing positive for for covid 2/9/21. Pt states he was hospitalized about 3 weeks ago with pneumonia. Pt c/o SOB with exertion.

## 2021-03-11 NOTE — ED PROVIDER NOTES
EMERGENCY DEPARTMENT HISTORY AND PHYSICAL EXAM      Date: (Not on file)  Patient Name: Zainab Mendes      History of Presenting Illness     Chief Complaint   Patient presents with    Positive For Covid-19    Shortness of Breath       History Provided By: Patient    HPI: Zainab Mendes, 70 y.o. male with a past medical history significant diabetes, hypertension, myocardial infarction, renal insufficiency, COPD, asthma and Coronary artery disease and COVID-19 on 2/9/2021 presents to the ED with cc of testing positive for COVID-19 virus and complains of continued shortness of breath; patient has nonproductive cough no fever no chills no chest pain; notes of breath is worse on exertion; patient admitted 2/13/2021 and discharge to 17 2021 for COVID-19 pneumonia and sent home on a 10-day course of Augmentin; patient states that he finished possible antibiotics 2 days ago  There are no other complaints, changes, or physical findings at this time. PCP: Carrie Schuster MD    Current Outpatient Medications   Medication Sig Dispense Refill    albuterol (PROVENTIL HFA, VENTOLIN HFA, PROAIR HFA) 90 mcg/actuation inhaler TAKE 2 PUFFS BY MOUTH EVERY 4 HOURS AS NEEDED FOR WHEEZE      apixaban (Eliquis) 2.5 mg tablet Take 2.5 mg by mouth two (2) times a day.  cetirizine-pseudoePHEDrine (ZyrTEC-D) 5-120 mg Tb12 Take 1 Tab by mouth two (2) times daily as needed.  insulin lispro (HUMALOG) 100 unit/mL kwikpen 22 Units by SubCUTAneous route.  losartan (COZAAR) 50 mg tablet TAKE 1 TABLET BY MOUTH EVERY DAY      acetaminophen (TYLENOL) 500 mg tablet TAKE 2 TABLETS BY MOUTH EVERY 6 (SIX) HOURS AS NEEDED FOR PAIN      atorvastatin (LIPITOR) 10 mg tablet Take 10 mg by mouth At bedtime.  HumaLOG KwikPen Insulin 100 unit/mL kwikpen INJECT 26 UNITS UNDER THE SKIN IN THE MORNING AND 22 UNITS AT SUPPER      insulin lispro (HUMALOG) 100 unit/mL injection by SubCUTAneous route.       amLODIPine (NORVASC) 5 mg tablet Take 1 Tab by mouth daily for 30 days. 30 Tab 0    ascorbic acid, vitamin C, (VITAMIN C) 1,000 mg tablet Take 1 Tab by mouth two (2) times a day for 30 days. 60 Tab 0    zinc sulfate (ZINCATE) 220 (50) mg capsule Take 1 Cap by mouth daily for 30 days. 30 Cap 0    omeprazole (PRILOSEC) 40 mg capsule Take 40 mg by mouth daily.  traMADol (ULTRAM) 50 mg tablet Take 50 mg by mouth every six (6) hours as needed for Pain.  insulin glargine (LANTUS) 100 unit/mL injection 60 Units by SubCUTAneous route nightly.  insulin aspart (NOVOLOG) 100 unit/mL injection 26 Units by SubCUTAneous route Every morning.  gabapentin (NEURONTIN) 300 mg capsule Take 300 mg by mouth three (3) times daily.  cholecalciferol (VITAMIN D3) 1,000 unit cap Take 1,000 Units by mouth daily.  aspirin delayed-release 81 mg tablet Take 81 mg by mouth daily.  methadone (DOLOPHINE) 10 mg tablet Take 10 mg by mouth four (4) times daily.  ondansetron (ZOFRAN ODT) 4 mg disintegrating tablet Take 1 Tab by mouth every eight (8) hours as needed for Nausea.  12 Tab 0       Past History     Past Medical History:  Past Medical History:   Diagnosis Date    Asthma     B12 deficiency     CAD (coronary artery disease)     Cataract     Chronic obstructive pulmonary disease (HCC)     CKD (chronic kidney disease)     COVID-19     Depression     Diabetes (HCC)     DJD (degenerative joint disease)     Heart attack ([facility] [address]     Hypertension     Long term prescription opiate use     Neuropathy     Rheumatoid arthritis (HCC)     Ulcer        Past Surgical History:  Past Surgical History:   Procedure Laterality Date    HX ANGIOPLASTY      HX HEENT      HX ORTHOPAEDIC      OH CARDIAC SURG PROCEDURE UNLIST      RCA stent       Family History:  Family History   Problem Relation Age of Onset    Diabetes Mother     Asthma Mother     Diabetes Father        Social History:  Social History     Tobacco Use    Smoking status: Former Smoker    Smokeless tobacco: Former User   Substance Use Topics    Alcohol use: No    Drug use: No       Allergies:  No Known Allergies      Review of Systems     Review of Systems   Constitutional: Negative for chills and fever. HENT: Negative for rhinorrhea and sore throat. Eyes: Negative for pain and visual disturbance. Respiratory: Positive for cough and shortness of breath. Negative for wheezing. Cardiovascular: Negative for chest pain and leg swelling. Gastrointestinal: Negative for abdominal pain. Endocrine: Negative for polydipsia and polyuria. Genitourinary: Negative for dysuria and urgency. Musculoskeletal: Negative for back pain and neck pain. Skin: Negative for color change and pallor. Neurological: Positive for numbness. Negative for facial asymmetry, speech difficulty, weakness and headaches. Psychiatric/Behavioral: Negative. Physical Exam     Physical Exam  Vitals signs and nursing note reviewed. HENT:      Head: Normocephalic and atraumatic. Eyes:      Extraocular Movements: Extraocular movements intact. Pupils: Pupils are equal, round, and reactive to light. Cardiovascular:      Rate and Rhythm: Normal rate and regular rhythm. Pulses: Normal pulses. Heart sounds: Normal heart sounds. Pulmonary:      Effort: Pulmonary effort is normal.      Breath sounds: Examination of the right-upper field reveals wheezing. Examination of the left-upper field reveals wheezing. Wheezing present. Abdominal:      General: Bowel sounds are normal.      Palpations: Abdomen is soft. Musculoskeletal: Normal range of motion. Right lower leg: He exhibits no tenderness. No edema. Left lower leg: He exhibits no tenderness. No edema. Skin:     General: Skin is warm and dry. Capillary Refill: Capillary refill takes less than 2 seconds. Neurological:      General: No focal deficit present.       Mental Status: He is alert and oriented to person, place, and time. Psychiatric:         Mood and Affect: Mood normal.         Behavior: Behavior normal.         Lab and Diagnostic Study Results     Labs -   No results found for this or any previous visit (from the past 12 hour(s)). Radiologic Studies -   [unfilled]  CT Results  (Last 48 hours)    None        CXR Results  (Last 48 hours)    None          Medical Decision Making and ED Course   - I am the first and primary provider for this patient AND AM THE PRIMARY PROVIDER OF RECORD. - I reviewed the vital signs, available nursing notes, past medical history, past surgical history, family history and social history. - Initial assessment performed. The patients presenting problems have been discussed, and the staff are in agreement with the care plan formulated and outlined with them. I have encouraged them to ask questions as they arise throughout their visit. Vital Signs-Reviewed the patient's vital signs. No data found. Records Reviewed: Nursing Notes    The patient presents with redness of breath with a differential diagnosis of CHF, ACS, pneumonia    ED Course:       ED Course as of Mar 11 1956   Thu Mar 11, 2021   1631 EKG sinus rhythm rate 98 AK interval 135 QT interval 430 flattened T waves in 3 aVF V5 V6, left ventricular hypertrophy    [SB]   1920 Call placed to our hospitalist hospitalist recommend transfer to [facility] for continuation of care    [SB]      ED Course User Index  [SB] Jae Engle MD         Provider Notes (Medical Decision Making): MDM           Consultations:       Consultations: - NONE        Procedures and Critical Care       Performed by: Shelbie Patricia MD  PROCEDURES:  Procedures             Shelbie Patricia MD        Disposition     Disposition: Condition stable and improved  DC- Adult Discharges: All of the diagnostic tests were reviewed and questions answered. Diagnosis, care plan and treatment options were discussed. The patient understands the instructions and will follow up as directed. The patients results have been reviewed with them. They have been counseled regarding their diagnosis. The patient verbally convey understanding and agreement of the signs, symptoms, diagnosis, treatment and prognosis and additionally agrees to follow up as recommended with their PCP in 24 - 48 hours. They also agree with the care-plan and convey that all of their questions have been answered. I have also put together some discharge instructions for them that include: 1) educational information regarding their diagnosis, 2) how to care for their diagnosis at home, as well a 3) list of reasons why they would want to return to the ED prior to their follow-up appointment, should their condition change. Remove if not discharged  DISCHARGE PLAN:  1. Current Discharge Medication List      CONTINUE these medications which have NOT CHANGED    Details   albuterol (PROVENTIL HFA, VENTOLIN HFA, PROAIR HFA) 90 mcg/actuation inhaler TAKE 2 PUFFS BY MOUTH EVERY 4 HOURS AS NEEDED FOR WHEEZE      apixaban (Eliquis) 2.5 mg tablet Take 2.5 mg by mouth two (2) times a day. cetirizine-pseudoePHEDrine (ZyrTEC-D) 5-120 mg Tb12 Take 1 Tab by mouth two (2) times daily as needed. !! insulin lispro (HUMALOG) 100 unit/mL kwikpen 22 Units by SubCUTAneous route. losartan (COZAAR) 50 mg tablet TAKE 1 TABLET BY MOUTH EVERY DAY      acetaminophen (TYLENOL) 500 mg tablet TAKE 2 TABLETS BY MOUTH EVERY 6 (SIX) HOURS AS NEEDED FOR PAIN      atorvastatin (LIPITOR) 10 mg tablet Take 10 mg by mouth At bedtime. !! HumaLOG KwikPen Insulin 100 unit/mL kwikpen INJECT 26 UNITS UNDER THE SKIN IN THE MORNING AND 22 UNITS AT SUPPER      insulin lispro (HUMALOG) 100 unit/mL injection by SubCUTAneous route. amLODIPine (NORVASC) 5 mg tablet Take 1 Tab by mouth daily for 30 days.   Qty: 30 Tab, Refills: 0      ascorbic acid, vitamin C, (VITAMIN C) 1,000 mg tablet Take 1 Tab by mouth two (2) times a day for 30 days. Qty: 60 Tab, Refills: 0      zinc sulfate (ZINCATE) 220 (50) mg capsule Take 1 Cap by mouth daily for 30 days. Qty: 30 Cap, Refills: 0      omeprazole (PRILOSEC) 40 mg capsule Take 40 mg by mouth daily. traMADol (ULTRAM) 50 mg tablet Take 50 mg by mouth every six (6) hours as needed for Pain. insulin glargine (LANTUS) 100 unit/mL injection 60 Units by SubCUTAneous route nightly. insulin aspart (NOVOLOG) 100 unit/mL injection 26 Units by SubCUTAneous route Every morning.      gabapentin (NEURONTIN) 300 mg capsule Take 300 mg by mouth three (3) times daily. cholecalciferol (VITAMIN D3) 1,000 unit cap Take 1,000 Units by mouth daily. aspirin delayed-release 81 mg tablet Take 81 mg by mouth daily. methadone (DOLOPHINE) 10 mg tablet Take 10 mg by mouth four (4) times daily. ondansetron (ZOFRAN ODT) 4 mg disintegrating tablet Take 1 Tab by mouth every eight (8) hours as needed for Nausea. Qty: 12 Tab, Refills: 0       !! - Potential duplicate medications found. Please discuss with provider. 2.   Follow-up Information    None       3. Return to ED if worse   4. Current Discharge Medication List          Diagnosis     Clinical Impression: No diagnosis found. Attestations:    Shane Mcintyre MD    Please note that this dictation was completed with [employer], the computer voice recognition software. Quite often unanticipated grammatical, syntax, homophones, and other interpretive errors are inadvertently transcribed by the computer software. Please disregard these errors. Please excuse any errors that have escaped final proofreading. Thank you.

## 2021-03-11 NOTE — ED TRIAGE NOTES
Pt c/o neuropathy pain in bilateral hands and feet. PT states he has been unable to get to his \"Pain dr appointments\".

## 2021-03-12 ENCOUNTER — PATIENT OUTREACH (OUTPATIENT)
Dept: CASE MANAGEMENT | Age: 76
End: 2021-03-12

## 2021-03-12 VITALS
DIASTOLIC BLOOD PRESSURE: 89 MMHG | OXYGEN SATURATION: 96 % | TEMPERATURE: 98.3 F | HEART RATE: 93 BPM | RESPIRATION RATE: 19 BRPM | SYSTOLIC BLOOD PRESSURE: 152 MMHG

## 2021-03-12 LAB
ATRIAL RATE: 90 BPM
CALCULATED P AXIS, ECG09: 45 DEGREES
CALCULATED R AXIS, ECG10: -23 DEGREES
CALCULATED T AXIS, ECG11: 68 DEGREES
DIAGNOSIS, 93000: NORMAL
P-R INTERVAL, ECG05: 135 MS
Q-T INTERVAL, ECG07: 351 MS
QRS DURATION, ECG06: 76 MS
QTC CALCULATION (BEZET), ECG08: 430 MS
VENTRICULAR RATE, ECG03: 90 BPM

## 2021-03-12 NOTE — ED NOTES
[facility] The patient is lying supine on the bed talking to staff. He is alert and oriented X 4.  O2 Sats of 97% on RA. He is on O2 for comfort to help with ease of breathing. The patient denies any new concerns at this time.

## 2021-03-12 NOTE — ACP (ADVANCE CARE PLANNING)
Advance Care Planning:   Does patient have an Advance Directive:  not on file; education provided. Ernestina Villela, daughter is listed as medical agent.

## 2021-03-12 NOTE — PROGRESS NOTES
Patient contacted regarding MANAS-95 diagnosis\". Discussed COVID-19 related testing which was available at this time. Test results were positive. Patient informed of results, if available? yes     Ambulatory Care Manager contacted the patient by telephone to perform post discharge assessment. Call within 2 business days of discharge: Yes Verified name and  with patient as identifiers. Provided introduction to self, and explanation of the CTN/ACM role, and reason for call due to risk factors for infection and/or exposure to COVID-19. Symptoms reviewed with patient who verbalized the following symptoms: cough and shortness of breath      Due to no new or worsening symptoms encounter was not routed to provider for escalation. Discussed follow-up appointments. If no appointment was previously scheduled, appointment scheduling offered:  no Patient stated he will call PCP for follow up appointment. Patients PCP is in [place] [address] does not schedule appointments with his practice. [facility] follow up appointment(s): No future appointments. [facility] follow up appointment(s): none reported   Advance Care Planning:   Does patient have an Advance Directive:  not on file; education provided. Gideon Ying, daughter is listed as medical agent. Patient has following risk factors of: COPD, asthma, diabetes and chronic kidney disease. ACM reviewed discharge instructions, medical action plan and red flags such as increased shortness of breath, increasing fever and signs of decompensation with patient who verbalized understanding. Discussed exposure protocols and quarantine with CDC Guidelines What to do if you are sick with coronavirus disease .  Patient was given an opportunity for questions and concerns. The patient agrees to contact the [facility] exposure line [phone], [facility] department  [address]  [phone] and PCP office for questions related to their healthcare.  ACM provided contact information for future needs. Declined 800#s, he was getting medications at pharmacy- cannot write down numbers. Reviewed and educated patient on any new and changed medications related to discharge diagnosis Patient reports he has obtained discharge medications and is taking all medications as prescribed. Was patient discharged with a pulse oximeter? Patient was given pulse ox on last ER visit in 2/9/21. Discussed and confirmed pulse oximeter discharge instructions and when to notify provider or seek emergency care. Pulse discharge instructions were reviewed. Patient/family/caregiver given information for [facility] and agrees to enroll no  Patient's preferred e-mail: n/a   Patient's preferred phone number: n/a  Patient has this [facility] contact information for questions or concerns. Plan to follow up in 14 days based on severity of symptoms and risk factors. DMB

## 2021-03-18 ENCOUNTER — APPOINTMENT (OUTPATIENT)
Dept: GENERAL RADIOLOGY | Age: 76
DRG: 813 | End: 2021-03-18
Attending: EMERGENCY MEDICINE
Payer: MEDICARE

## 2021-03-18 ENCOUNTER — HOSPITAL ENCOUNTER (INPATIENT)
Age: 76
LOS: 4 days | Discharge: HOME HEALTH | DRG: 813 | End: 2021-03-22
Attending: EMERGENCY MEDICINE | Admitting: HOSPITALIST
Payer: MEDICARE

## 2021-03-18 DIAGNOSIS — J18.9 PNEUMONIA OF BOTH LUNGS DUE TO INFECTIOUS ORGANISM, UNSPECIFIED PART OF LUNG: Primary | ICD-10-CM

## 2021-03-18 DIAGNOSIS — E08.43 DIABETIC AUTONOMIC NEUROPATHY ASSOCIATED WITH DIABETES MELLITUS DUE TO UNDERLYING CONDITION (HCC): ICD-10-CM

## 2021-03-18 DIAGNOSIS — K92.2 GASTROINTESTINAL HEMORRHAGE, UNSPECIFIED GASTROINTESTINAL HEMORRHAGE TYPE: ICD-10-CM

## 2021-03-18 LAB
ALBUMIN SERPL-MCNC: 2.6 G/DL (ref 3.5–5)
ALBUMIN/GLOB SERPL: 0.6 {RATIO} (ref 1.1–2.2)
ALP SERPL-CCNC: 76 U/L (ref 45–117)
ALT SERPL-CCNC: 38 U/L (ref 12–78)
ANION GAP SERPL CALC-SCNC: 9 MMOL/L (ref 5–15)
AST SERPL W P-5'-P-CCNC: 23 U/L (ref 15–37)
BASOPHILS # BLD: 0.1 K/UL (ref 0–0.2)
BASOPHILS NFR BLD: 1 % (ref 0–2.5)
BILIRUB SERPL-MCNC: 0.2 MG/DL (ref 0.2–1)
BNP SERPL-MCNC: 241 PG/ML
BUN SERPL-MCNC: 48 MG/DL (ref 6–20)
BUN/CREAT SERPL: 18 (ref 12–20)
CA-I BLD-MCNC: 8.8 MG/DL (ref 8.5–10.1)
CHLORIDE SERPL-SCNC: 106 MMOL/L (ref 97–108)
CO2 SERPL-SCNC: 26 MMOL/L (ref 21–32)
COVID-19 RAPID TEST, COVR: NOT DETECTED
CREAT SERPL-MCNC: 2.66 MG/DL (ref 0.7–1.3)
EOSINOPHIL # BLD: 0 K/UL (ref 0–0.7)
EOSINOPHIL NFR BLD: 0 % (ref 0.9–2.9)
ERYTHROCYTE [DISTWIDTH] IN BLOOD BY AUTOMATED COUNT: 18 % (ref 11.5–14.5)
GLOBULIN SER CALC-MCNC: 4.4 G/DL (ref 2–4)
GLUCOSE BLD STRIP.AUTO-MCNC: 389 MG/DL (ref 65–100)
GLUCOSE SERPL-MCNC: 198 MG/DL (ref 65–100)
HCT VFR BLD AUTO: 23.4 % (ref 41–53)
HCT VFR BLD AUTO: 24.4 % (ref 41–53)
HGB BLD-MCNC: 7.5 G/DL (ref 13.5–17.5)
HGB BLD-MCNC: 7.9 G/DL (ref 13.5–17.5)
INR PPP: 1 (ref 0.9–1.1)
LYMPHOCYTES # BLD: 0.6 K/UL (ref 1–4.8)
LYMPHOCYTES NFR BLD: 7 % (ref 20.5–51.1)
MAGNESIUM SERPL-MCNC: 2.1 MG/DL (ref 1.6–2.4)
MCH RBC QN AUTO: 31.1 PG (ref 31–34)
MCHC RBC AUTO-ENTMCNC: 32.3 G/DL (ref 31–36)
MCV RBC AUTO: 96.4 FL (ref 80–100)
MONOCYTES # BLD: 0.4 K/UL (ref 0.2–2.4)
MONOCYTES NFR BLD: 4 % (ref 1.7–9.3)
NEUTS SEG # BLD: 8.6 K/UL (ref 1.8–7.7)
NEUTS SEG NFR BLD: 88 % (ref 42–75)
NRBC # BLD: 0 K/UL
NRBC BLD-RTO: 0 PER 100 WBC
PERFORMED BY, TECHID: ABNORMAL
PLATELET # BLD AUTO: 515 K/UL
PMV BLD AUTO: 7.6 FL (ref 6.5–11.5)
POTASSIUM SERPL-SCNC: 4.8 MMOL/L (ref 3.5–5.1)
PROT SERPL-MCNC: 7 G/DL (ref 6.4–8.2)
PROTHROMBIN TIME: 9.6 SEC (ref 9–11.1)
RBC # BLD AUTO: 2.53 M/UL (ref 4.5–5.9)
SODIUM SERPL-SCNC: 141 MMOL/L (ref 136–145)
SPECIMEN SOURCE: NORMAL
TROPONIN I SERPL-MCNC: <0.05 NG/ML
WBC # BLD AUTO: 9.7 K/UL (ref 4.4–11.3)

## 2021-03-18 PROCEDURE — 82962 GLUCOSE BLOOD TEST: CPT

## 2021-03-18 PROCEDURE — 65270000029 HC RM PRIVATE

## 2021-03-18 PROCEDURE — 85025 COMPLETE CBC W/AUTO DIFF WBC: CPT

## 2021-03-18 PROCEDURE — 85610 PROTHROMBIN TIME: CPT

## 2021-03-18 PROCEDURE — 84145 PROCALCITONIN (PCT): CPT

## 2021-03-18 PROCEDURE — 84484 ASSAY OF TROPONIN QUANT: CPT

## 2021-03-18 PROCEDURE — 83880 ASSAY OF NATRIURETIC PEPTIDE: CPT

## 2021-03-18 PROCEDURE — 96374 THER/PROPH/DIAG INJ IV PUSH: CPT

## 2021-03-18 PROCEDURE — 74011000250 HC RX REV CODE- 250: Performed by: EMERGENCY MEDICINE

## 2021-03-18 PROCEDURE — 96375 TX/PRO/DX INJ NEW DRUG ADDON: CPT

## 2021-03-18 PROCEDURE — 74011636637 HC RX REV CODE- 636/637: Performed by: NURSE PRACTITIONER

## 2021-03-18 PROCEDURE — 87635 SARS-COV-2 COVID-19 AMP PRB: CPT

## 2021-03-18 PROCEDURE — 74011250637 HC RX REV CODE- 250/637: Performed by: NURSE PRACTITIONER

## 2021-03-18 PROCEDURE — 94640 AIRWAY INHALATION TREATMENT: CPT

## 2021-03-18 PROCEDURE — C9113 INJ PANTOPRAZOLE SODIUM, VIA: HCPCS | Performed by: NURSE PRACTITIONER

## 2021-03-18 PROCEDURE — 74011250636 HC RX REV CODE- 250/636: Performed by: EMERGENCY MEDICINE

## 2021-03-18 PROCEDURE — 74011000250 HC RX REV CODE- 250: Performed by: NURSE PRACTITIONER

## 2021-03-18 PROCEDURE — 71045 X-RAY EXAM CHEST 1 VIEW: CPT

## 2021-03-18 PROCEDURE — 80053 COMPREHEN METABOLIC PANEL: CPT

## 2021-03-18 PROCEDURE — 85018 HEMOGLOBIN: CPT

## 2021-03-18 PROCEDURE — 99284 EMERGENCY DEPT VISIT MOD MDM: CPT

## 2021-03-18 PROCEDURE — 83735 ASSAY OF MAGNESIUM: CPT

## 2021-03-18 PROCEDURE — 74011250636 HC RX REV CODE- 250/636: Performed by: NURSE PRACTITIONER

## 2021-03-18 RX ORDER — IPRATROPIUM BROMIDE AND ALBUTEROL SULFATE 2.5; .5 MG/3ML; MG/3ML
3 SOLUTION RESPIRATORY (INHALATION)
Status: DISCONTINUED | OUTPATIENT
Start: 2021-03-18 | End: 2021-03-22 | Stop reason: HOSPADM

## 2021-03-18 RX ORDER — MAGNESIUM SULFATE 100 %
4 CRYSTALS MISCELLANEOUS AS NEEDED
Status: DISCONTINUED | OUTPATIENT
Start: 2021-03-18 | End: 2021-03-22 | Stop reason: HOSPADM

## 2021-03-18 RX ORDER — GABAPENTIN 300 MG/1
300 CAPSULE ORAL 3 TIMES DAILY
Status: DISCONTINUED | OUTPATIENT
Start: 2021-03-18 | End: 2021-03-20

## 2021-03-18 RX ORDER — ONDANSETRON 2 MG/ML
4 INJECTION INTRAMUSCULAR; INTRAVENOUS
Status: DISCONTINUED | OUTPATIENT
Start: 2021-03-18 | End: 2021-03-22 | Stop reason: HOSPADM

## 2021-03-18 RX ORDER — ATORVASTATIN CALCIUM 10 MG/1
10 TABLET, FILM COATED ORAL
Status: DISCONTINUED | OUTPATIENT
Start: 2021-03-18 | End: 2021-03-22 | Stop reason: HOSPADM

## 2021-03-18 RX ORDER — ALBUTEROL SULFATE 90 UG/1
2 AEROSOL, METERED RESPIRATORY (INHALATION)
Status: DISCONTINUED | OUTPATIENT
Start: 2021-03-18 | End: 2021-03-19

## 2021-03-18 RX ORDER — AMLODIPINE BESYLATE 5 MG/1
5 TABLET ORAL DAILY
Status: DISCONTINUED | OUTPATIENT
Start: 2021-03-19 | End: 2021-03-22 | Stop reason: HOSPADM

## 2021-03-18 RX ORDER — KETOROLAC TROMETHAMINE 30 MG/ML
15 INJECTION, SOLUTION INTRAMUSCULAR; INTRAVENOUS
Status: COMPLETED | OUTPATIENT
Start: 2021-03-18 | End: 2021-03-18

## 2021-03-18 RX ORDER — ACETAMINOPHEN 325 MG/1
650 TABLET ORAL
Status: DISCONTINUED | OUTPATIENT
Start: 2021-03-18 | End: 2021-03-22 | Stop reason: HOSPADM

## 2021-03-18 RX ORDER — INSULIN LISPRO 100 [IU]/ML
INJECTION, SOLUTION INTRAVENOUS; SUBCUTANEOUS
Status: DISCONTINUED | OUTPATIENT
Start: 2021-03-18 | End: 2021-03-22 | Stop reason: HOSPADM

## 2021-03-18 RX ORDER — INSULIN GLARGINE 100 [IU]/ML
60 INJECTION, SOLUTION SUBCUTANEOUS
Status: DISCONTINUED | OUTPATIENT
Start: 2021-03-18 | End: 2021-03-22 | Stop reason: HOSPADM

## 2021-03-18 RX ORDER — IPRATROPIUM BROMIDE AND ALBUTEROL SULFATE 2.5; .5 MG/3ML; MG/3ML
3 SOLUTION RESPIRATORY (INHALATION)
Status: COMPLETED | OUTPATIENT
Start: 2021-03-18 | End: 2021-03-18

## 2021-03-18 RX ORDER — PREDNISONE 20 MG/1
20 TABLET ORAL
Status: DISCONTINUED | OUTPATIENT
Start: 2021-03-19 | End: 2021-03-20

## 2021-03-18 RX ORDER — CALCIUM CARB/MAGNESIUM CARB 311-232MG
5 TABLET ORAL
Status: DISCONTINUED | OUTPATIENT
Start: 2021-03-18 | End: 2021-03-22 | Stop reason: HOSPADM

## 2021-03-18 RX ORDER — DEXTROSE 50 % IN WATER (D50W) INTRAVENOUS SYRINGE
25-50 AS NEEDED
Status: DISCONTINUED | OUTPATIENT
Start: 2021-03-18 | End: 2021-03-22 | Stop reason: HOSPADM

## 2021-03-18 RX ORDER — IPRATROPIUM BROMIDE AND ALBUTEROL SULFATE 2.5; .5 MG/3ML; MG/3ML
3 SOLUTION RESPIRATORY (INHALATION) 4 TIMES DAILY
Status: DISCONTINUED | OUTPATIENT
Start: 2021-03-18 | End: 2021-03-18

## 2021-03-18 RX ADMIN — IPRATROPIUM BROMIDE AND ALBUTEROL SULFATE 3 ML: .5; 3 SOLUTION RESPIRATORY (INHALATION) at 15:31

## 2021-03-18 RX ADMIN — PANTOPRAZOLE SODIUM 40 MG: 40 INJECTION, POWDER, FOR SOLUTION INTRAVENOUS at 22:25

## 2021-03-18 RX ADMIN — INSULIN GLARGINE 60 UNITS: 100 INJECTION, SOLUTION SUBCUTANEOUS at 22:11

## 2021-03-18 RX ADMIN — KETOROLAC TROMETHAMINE 15 MG: 30 INJECTION, SOLUTION INTRAMUSCULAR at 15:07

## 2021-03-18 RX ADMIN — IPRATROPIUM BROMIDE AND ALBUTEROL SULFATE 3 ML: .5; 3 SOLUTION RESPIRATORY (INHALATION) at 20:11

## 2021-03-18 RX ADMIN — INSULIN LISPRO 10 UNITS: 100 INJECTION, SOLUTION INTRAVENOUS; SUBCUTANEOUS at 22:11

## 2021-03-18 RX ADMIN — ATORVASTATIN CALCIUM 10 MG: 10 TABLET, FILM COATED ORAL at 22:11

## 2021-03-18 RX ADMIN — GABAPENTIN 300 MG: 300 CAPSULE ORAL at 22:10

## 2021-03-18 RX ADMIN — METHYLPREDNISOLONE SODIUM SUCCINATE 125 MG: 125 INJECTION, POWDER, FOR SOLUTION INTRAMUSCULAR; INTRAVENOUS at 15:07

## 2021-03-18 NOTE — ED TRIAGE NOTES
Patient reports testing positive for COVID-19 on Feb 9, 2021. Patient reports being short of breath since. Patient recently discharged from in patient for same; does not feel any better.

## 2021-03-18 NOTE — H&P
Hospitalist           History and Physical Note: 3/18/2021      Subjective:   Patient is a 75 year old male with history of Diabetes, hypertension, COPD and CKD stage III who presents to the emergency department today with complaints of shortness of breath. Of note he was diagnosed with Covid-19 on 02/09/202. He was admitted here at [facility] and treated for Covid-19. After discharge from the hospital, his PCP started him on Eliquis for PE prophylaxis secondary to Covid-19. His stool was positive for occult blood in ER. He has had two other emergency room encounters since discharge for shortness of breath. His hemoglobin noted to have slowly trended down since 02/09/2021. His Hemoglobin today is 7.9 from 11.3 on 02/09/21. Chest xray in ER today shows bilateral opacities not changed from prior. Although he is short of breath his oxygen saturation is 96% on room air.  He will be admitted for further management.     Problem List:  Patient Active Problem List   Diagnosis Code   • COVID-19 U07.1   • Dyspnea R06.00   • COPD (chronic obstructive pulmonary disease) (HCC) J44.9   • Positive D dimer R79.89   • CKD (chronic kidney disease) N18.9   • GI bleed K92.2         Medications reviewed  Current Facility-Administered Medications   Medication Dose Route Frequency   • acetaminophen (TYLENOL) tablet 650 mg  650 mg Oral Q6H PRN   • albuterol (PROVENTIL HFA, VENTOLIN HFA, PROAIR HFA) inhaler 2 Puff  2 Puff Inhalation Q6H PRN   • [START ON 3/19/2021] amLODIPine (NORVASC) tablet 5 mg  5 mg Oral DAILY   • atorvastatin (LIPITOR) tablet 10 mg  10 mg Oral QHS   • gabapentin (NEURONTIN) capsule 300 mg  300 mg Oral TID   • insulin glargine (LANTUS) injection 60 Units  60 Units SubCUTAneous QHS   • glucose chewable tablet 16 g  4 Tab Oral PRN   • dextrose (D50W) injection syrg 12.5-25 g  25-50 mL IntraVENous PRN   • glucagon (GLUCAGEN) injection 1 mg  1 mg IntraMUSCular PRN   • insulin lispro (HUMALOG)  injection   SubCUTAneous AC&HS    ondansetron (ZOFRAN) injection 4 mg  4 mg IntraVENous Q8H PRN    pantoprazole (PROTONIX) 40 mg in 0.9% sodium chloride 10 mL injection  40 mg IntraVENous Q12H    piperacillin-tazobactam (ZOSYN) 3.375 g in 0.9% sodium chloride (MBP/ADV) 100 mL MBP  3.375 g IntraVENous Q8H    albuterol-ipratropium (DUO-NEB) 2.5 MG-0.5 MG/3 ML  3 mL Nebulization QID    [START ON 3/19/2021] predniSONE (DELTASONE) tablet 20 mg  20 mg Oral DAILY WITH BREAKFAST    melatonin (rapid dissolve) tablet 5 mg  5 mg Oral QHS PRN     Current Outpatient Medications   Medication Sig    albuterol (PROVENTIL HFA, VENTOLIN HFA, PROAIR HFA) 90 mcg/actuation inhaler TAKE 2 PUFFS BY MOUTH EVERY 4 HOURS AS NEEDED FOR WHEEZE    apixaban (Eliquis) 2.5 mg tablet Take 2.5 mg by mouth two (2) times a day.  atorvastatin (LIPITOR) 10 mg tablet Take 10 mg by mouth At bedtime.  HumaLOG KwikPen Insulin 100 unit/mL kwikpen INJECT 26 UNITS UNDER THE SKIN IN THE MORNING AND 22 UNITS AT SUPPER    losartan (COZAAR) 50 mg tablet TAKE 1 TABLET BY MOUTH EVERY DAY    amoxicillin-clavulanate (Augmentin) 875-125 mg per tablet Take 1 Tab by mouth two (2) times a day for 10 days.  amLODIPine (NORVASC) 5 mg tablet Take 1 Tab by mouth daily for 30 days.  ascorbic acid, vitamin C, (VITAMIN C) 1,000 mg tablet Take 1 Tab by mouth two (2) times a day for 30 days.  zinc sulfate (ZINCATE) 220 (50) mg capsule Take 1 Cap by mouth daily for 30 days.  insulin aspart (NOVOLOG) 100 unit/mL injection 26 Units by SubCUTAneous route Every morning.  aspirin delayed-release 81 mg tablet Take 81 mg by mouth daily.  ondansetron (ZOFRAN ODT) 4 mg disintegrating tablet Take 1 Tab by mouth every eight (8) hours as needed for Nausea.     acetaminophen (TYLENOL) 500 mg tablet TAKE 2 TABLETS BY MOUTH EVERY 6 (SIX) HOURS AS NEEDED FOR PAIN    cetirizine-pseudoePHEDrine (ZyrTEC-D) 5-120 mg Tb12 Take 1 Tab by mouth two (2) times daily as needed.  insulin lispro (HUMALOG) 100 unit/mL kwikpen 22 Units by SubCUTAneous route.  insulin lispro (HUMALOG) 100 unit/mL injection by SubCUTAneous route.  omeprazole (PRILOSEC) 40 mg capsule Take 40 mg by mouth daily.  traMADol (ULTRAM) 50 mg tablet Take 50 mg by mouth every six (6) hours as needed for Pain.  insulin glargine (LANTUS) 100 unit/mL injection 60 Units by SubCUTAneous route nightly.  gabapentin (NEURONTIN) 300 mg capsule Take 300 mg by mouth three (3) times daily.  cholecalciferol (VITAMIN D3) 1,000 unit cap Take 1,000 Units by mouth daily.  methadone (DOLOPHINE) 10 mg tablet Take 10 mg by mouth four (4) times daily. Review of Systems:   Review of Systems   Constitutional: Negative for chills, diaphoresis, fever, malaise/fatigue and weight loss. HENT: Negative for ear discharge, ear pain, hearing loss, nosebleeds, sinus pain and tinnitus. Respiratory: Positive for shortness of breath. Negative for cough, hemoptysis, sputum production and wheezing. Cardiovascular: Negative for chest pain, palpitations, orthopnea, claudication and leg swelling. Gastrointestinal: Negative for abdominal pain, constipation, diarrhea, heartburn, nausea and vomiting. Genitourinary: Negative for dysuria, flank pain, frequency, hematuria and urgency. Musculoskeletal: Negative for back pain, falls, joint pain, myalgias and neck pain. Neurological: Negative for dizziness, tingling, focal weakness, seizures, weakness and headaches. Psychiatric/Behavioral: Negative for depression, hallucinations, memory loss, substance abuse and suicidal ideas. The patient is not nervous/anxious. Objective:   Physical Exam  Constitutional:       General: He is not in acute distress. Appearance: Normal appearance. He is normal weight. HENT:      Head: Normocephalic and atraumatic. Mouth/Throat:      Mouth: Mucous membranes are moist.      Pharynx: Oropharynx is clear.    Eyes: Pupils: Pupils are equal, round, and reactive to light. Neck:      Musculoskeletal: No neck rigidity. Cardiovascular:      Rate and Rhythm: Normal rate and regular rhythm. Heart sounds: No murmur. No friction rub. No gallop. Pulmonary:      Effort: Pulmonary effort is normal. No respiratory distress. Breath sounds: Wheezing present. No rales. Abdominal:      General: Bowel sounds are normal. There is no distension. Palpations: Abdomen is soft. Tenderness: There is no abdominal tenderness. There is no rebound. Musculoskeletal: Normal range of motion. General: No swelling, tenderness, deformity or signs of injury. Skin:     General: Skin is warm and dry. Coloration: Skin is not jaundiced. Findings: No bruising, erythema or rash. Neurological:      General: No focal deficit present. Mental Status: He is alert and oriented to person, place, and time. Sensory: No sensory deficit. Motor: No weakness. Gait: Gait normal.   Psychiatric:         Mood and Affect: Mood normal.         Behavior: Behavior normal.         Thought Content:  Thought content normal.          Visit Vitals  /76 (BP 1 Location: Right lower arm, BP Patient Position: Supine)   Pulse 97   Temp 98.4 °F (36.9 °C)   Resp 19   SpO2 96%           Data Review:       Recent Days:  Recent Labs     03/18/21  1503   WBC 9.7   HGB 7.9*   HCT 24.4*        Recent Labs     03/18/21  1503      K 4.8      CO2 26   *   BUN 48*   CREA 2.66*   CA 8.8   MG 2.1   ALB 2.6*   TBILI 0.2   ALT 38   INR 1.0       Past Surgical History:   Procedure Laterality Date    HX ANGIOPLASTY      HX HEENT      HX ORTHOPAEDIC      [place] CARDIAC SURG PROCEDURE UNLIST      RCA stent        Family History   Problem Relation Age of Onset    Diabetes Mother     Asthma Mother     Diabetes Father         Past Medical History:   Diagnosis Date    Asthma     B12 deficiency     CAD (coronary artery disease)     Cataract     Chronic obstructive pulmonary disease (HCC)     CKD (chronic kidney disease)     COVID-19     Depression     Diabetes ([facility] [address]     DJD (degenerative joint disease)     Heart attack ([facility] [address]     Hypertension     Long term prescription opiate use     Neuropathy     Rheumatoid arthritis (HCC)     Ulcer         Social History     Tobacco Use    Smoking status: Former Smoker    Smokeless tobacco: Former User   Substance Use Topics    Alcohol use: No    Drug use: No      Assessment/Plan     1. Acute Blood Loss Anemia  Likely secondary to anticoagulant Eliquis  Stool is positive for occult blood  Hemoglobin is 7.9 today from 11.3 on 02/09/21  Stop Eliquis and repeat H&H at 2100   If hemoglobin drops below 7 will transfuse. 2. GI Bleed   Likely secondary to Eliquis  Stop Eliquis and will trend hemoglobin level Q 6 hours  Transfuse if hemoglobin less than 7  Start Protonix 40 mg IV BID  Clear liquid diet and NP past midnight  Consult GI to evaluate for possible endoscopic workup    3. Bilateral Lobe Pneumonia  Likely residual for recent covid-19 infection  Rapid Covid-19 result is negative today  Will start on broad spectrum antiiotic Zosyn    4. COPD exacerbation  He quit smoking 3 months ago  Has some mild scattered wheezes bilaterlly  Start Duonebs and PO prednisone    5. Recent history of Covid-19 infection  Rapid covid-19 test negative today  With dyspnea with start prednisone 20 mg daily    6. Hypertension  Restart Amlodipine   Hold Losartan for now due to poor renal function    7. Diabetes Type 2  A1c was 9.6% on 02/13/21  Start SSI and lantus at bedtime  Accu check ACHS    8. CKD stage III  Creatinine is 2.66, no significant change from prior.   Avoid nephrotoxic medications      CBC, BMP in am  SCDs for DVT prophylaxis  Full code          Salvatore Taylor, NP

## 2021-03-18 NOTE — ED PROVIDER NOTES
EMERGENCY DEPARTMENT HISTORY AND PHYSICAL EXAM      Date: 3/18/2021  Patient Name: Quinn Marie      History of Presenting Illness     Chief Complaint   Patient presents with    Shortness of Breath       History Provided By: Patient    HPI: Quinn Marie, 70 y.o. male with a past medical history significant COVID-19 pneumonia diagnosed  February 9, 2021 presents to the ED with cc of continued shortness of breath difficulty walking long distances; patient missed his primary care follow-up appointment this week because he did not have a ride    There are no other complaints, changes, or physical findings at this time. PCP: Carrie Schuster MD    Current Facility-Administered Medications   Medication Dose Route Frequency Provider Last Rate Last Admin    methylPREDNISolone (PF) (Solu-MEDROL) injection 125 mg  125 mg IntraVENous NOW Hetal Mcdermott MD        albuterol-ipratropium (DUO-NEB) 2.5 MG-0.5 MG/3 ML  3 mL Nebulization NOW Hetal Mcdermott MD        ketorolac (TORADOL) injection 15 mg  15 mg IntraVENous NOW Hetal Mcdermott MD         Current Outpatient Medications   Medication Sig Dispense Refill    albuterol (PROVENTIL HFA, VENTOLIN HFA, PROAIR HFA) 90 mcg/actuation inhaler TAKE 2 PUFFS BY MOUTH EVERY 4 HOURS AS NEEDED FOR WHEEZE      apixaban (Eliquis) 2.5 mg tablet Take 2.5 mg by mouth two (2) times a day.  atorvastatin (LIPITOR) 10 mg tablet Take 10 mg by mouth At bedtime.  HumaLOG KwikPen Insulin 100 unit/mL kwikpen INJECT 26 UNITS UNDER THE SKIN IN THE MORNING AND 22 UNITS AT SUPPER      losartan (COZAAR) 50 mg tablet TAKE 1 TABLET BY MOUTH EVERY DAY      amoxicillin-clavulanate (Augmentin) 875-125 mg per tablet Take 1 Tab by mouth two (2) times a day for 10 days. 20 Tab 0    amLODIPine (NORVASC) 5 mg tablet Take 1 Tab by mouth daily for 30 days.  30 Tab 0    ascorbic acid, vitamin C, (VITAMIN C) 1,000 mg tablet Take 1 Tab by mouth two (2) times a day for 30 days. 60 Tab 0    zinc sulfate (ZINCATE) 220 (50) mg capsule Take 1 Cap by mouth daily for 30 days. 30 Cap 0    insulin aspart (NOVOLOG) 100 unit/mL injection 26 Units by SubCUTAneous route Every morning.  aspirin delayed-release 81 mg tablet Take 81 mg by mouth daily.  ondansetron (ZOFRAN ODT) 4 mg disintegrating tablet Take 1 Tab by mouth every eight (8) hours as needed for Nausea. 12 Tab 0    acetaminophen (TYLENOL) 500 mg tablet TAKE 2 TABLETS BY MOUTH EVERY 6 (SIX) HOURS AS NEEDED FOR PAIN      cetirizine-pseudoePHEDrine (ZyrTEC-D) 5-120 mg Tb12 Take 1 Tab by mouth two (2) times daily as needed.  insulin lispro (HUMALOG) 100 unit/mL kwikpen 22 Units by SubCUTAneous route.  insulin lispro (HUMALOG) 100 unit/mL injection by SubCUTAneous route.  omeprazole (PRILOSEC) 40 mg capsule Take 40 mg by mouth daily.  traMADol (ULTRAM) 50 mg tablet Take 50 mg by mouth every six (6) hours as needed for Pain.  insulin glargine (LANTUS) 100 unit/mL injection 60 Units by SubCUTAneous route nightly.  gabapentin (NEURONTIN) 300 mg capsule Take 300 mg by mouth three (3) times daily.  cholecalciferol (VITAMIN D3) 1,000 unit cap Take 1,000 Units by mouth daily.  methadone (DOLOPHINE) 10 mg tablet Take 10 mg by mouth four (4) times daily.          Past History     Past Medical History:  Past Medical History:   Diagnosis Date    Asthma     B12 deficiency     CAD (coronary artery disease)     Cataract     Chronic obstructive pulmonary disease (HCC)     CKD (chronic kidney disease)     COVID-19     Depression     Diabetes (HCC)     DJD (degenerative joint disease)     [facility] attack ([facility] [address]     Hypertension     Long term prescription opiate use     Neuropathy     Rheumatoid arthritis ([facility] [address]     Ulcer        Past Surgical History:  Past Surgical History:   Procedure Laterality Date    HX ANGIOPLASTY      HX HEENT      HX ORTHOPAEDIC      [place] CARDIAC SURG PROCEDURE UNLIST RCA stent       Family History:  Family History   Problem Relation Age of Onset    Diabetes Mother     Asthma Mother     Diabetes Father        Social History:  Social History     Tobacco Use    Smoking status: Former Smoker    Smokeless tobacco: Former User   Substance Use Topics    Alcohol use: No    Drug use: No       Allergies:  No Known Allergies      Review of Systems     Review of Systems   Constitutional: Negative for chills and fever. HENT: Negative for rhinorrhea and sore throat. Eyes: Negative for pain and visual disturbance. Respiratory: Positive for shortness of breath. Negative for cough. Cardiovascular: Negative for chest pain, palpitations and leg swelling. Gastrointestinal: Negative for abdominal pain and vomiting. Endocrine: Negative for polydipsia and polyuria. Genitourinary: Negative for dysuria and urgency. Musculoskeletal: Negative for back pain and neck pain. Skin: Negative for color change and pallor. Neurological: Negative for weakness and numbness. Psychiatric/Behavioral: Negative. Physical Exam     Physical Exam  Vitals signs and nursing note reviewed. Constitutional:       Appearance: He is well-developed. HENT:      Head: Normocephalic and atraumatic. Eyes:      Extraocular Movements: Extraocular movements intact. Pupils: Pupils are equal, round, and reactive to light. Neck:      Musculoskeletal: Normal range of motion and neck supple. Cardiovascular:      Rate and Rhythm: Normal rate and regular rhythm. Pulmonary:      Effort: Pulmonary effort is normal.      Breath sounds: Normal breath sounds. Abdominal:      General: Bowel sounds are normal.      Palpations: Abdomen is soft. Genitourinary:     Rectum: Guaiac result positive. Musculoskeletal: Normal range of motion. Left lower leg: He exhibits no tenderness. No edema. Skin:     General: Skin is warm and dry.       Capillary Refill: Capillary refill takes less than 2 seconds. Neurological:      General: No focal deficit present. Mental Status: He is alert and oriented to person, place, and time. Psychiatric:         Mood and Affect: Mood normal.         Behavior: Behavior normal.         Lab and Diagnostic Study Results     Labs -   No results found for this or any previous visit (from the past 12 hour(s)). Radiologic Studies -   [unfilled]  CT Results  (Last 48 hours)    None        CXR Results  (Last 48 hours)    None          Medical Decision Making and ED Course   - I am the first and primary provider for this patient AND AM THE PRIMARY PROVIDER OF RECORD. - I reviewed the vital signs, available nursing notes, past medical history, past surgical history, family history and social history. - Initial assessment performed. The patients presenting problems have been discussed, and the staff are in agreement with the care plan formulated and outlined with them. I have encouraged them to ask questions as they arise throughout their visit. Vital Signs-Reviewed the patient's vital signs. Patient Vitals for the past 12 hrs:   Temp Pulse Resp BP SpO2   03/18/21 1417     97 %   03/18/21 1406 98.4 °F (36.9 °C) (!) 101 17 131/78 97 %       Records Reviewed: Nursing Notes    The patient presents with breath with a differential diagnosis of CHF, COPD, pneumonia    ED Course:              Provider Notes (Medical Decision Making): MDM           Consultations:       Consultations: - NONE        Procedures and Critical Care       Performed by: Ciara Woods MD  PROCEDURES:  Procedures               Ciara Woods MD        Disposition     Disposition: Condition stable  Admitted to Floor Medical Floor the case was discussed with the admitting physician nurse practitioner Any Coburn if not discharged  DISCHARGE PLAN:  1.    Current Discharge Medication List      CONTINUE these medications which have NOT CHANGED    Details albuterol (PROVENTIL HFA, VENTOLIN HFA, PROAIR HFA) 90 mcg/actuation inhaler TAKE 2 PUFFS BY MOUTH EVERY 4 HOURS AS NEEDED FOR WHEEZE      apixaban (Eliquis) 2.5 mg tablet Take 2.5 mg by mouth two (2) times a day. atorvastatin (LIPITOR) 10 mg tablet Take 10 mg by mouth At bedtime. !! HumaLOG KwikPen Insulin 100 unit/mL kwikpen INJECT 26 UNITS UNDER THE SKIN IN THE MORNING AND 22 UNITS AT SUPPER      losartan (COZAAR) 50 mg tablet TAKE 1 TABLET BY MOUTH EVERY DAY      amoxicillin-clavulanate (Augmentin) 875-125 mg per tablet Take 1 Tab by mouth two (2) times a day for 10 days. Qty: 20 Tab, Refills: 0      amLODIPine (NORVASC) 5 mg tablet Take 1 Tab by mouth daily for 30 days. Qty: 30 Tab, Refills: 0      ascorbic acid, vitamin C, (VITAMIN C) 1,000 mg tablet Take 1 Tab by mouth two (2) times a day for 30 days. Qty: 60 Tab, Refills: 0      zinc sulfate (ZINCATE) 220 (50) mg capsule Take 1 Cap by mouth daily for 30 days. Qty: 30 Cap, Refills: 0      insulin aspart (NOVOLOG) 100 unit/mL injection 26 Units by SubCUTAneous route Every morning. aspirin delayed-release 81 mg tablet Take 81 mg by mouth daily. ondansetron (ZOFRAN ODT) 4 mg disintegrating tablet Take 1 Tab by mouth every eight (8) hours as needed for Nausea. Qty: 12 Tab, Refills: 0      acetaminophen (TYLENOL) 500 mg tablet TAKE 2 TABLETS BY MOUTH EVERY 6 (SIX) HOURS AS NEEDED FOR PAIN      cetirizine-pseudoePHEDrine (ZyrTEC-D) 5-120 mg Tb12 Take 1 Tab by mouth two (2) times daily as needed. !! insulin lispro (HUMALOG) 100 unit/mL kwikpen 22 Units by SubCUTAneous route. insulin lispro (HUMALOG) 100 unit/mL injection by SubCUTAneous route. omeprazole (PRILOSEC) 40 mg capsule Take 40 mg by mouth daily. traMADol (ULTRAM) 50 mg tablet Take 50 mg by mouth every six (6) hours as needed for Pain.       insulin glargine (LANTUS) 100 unit/mL injection 60 Units by SubCUTAneous route nightly.      gabapentin (NEURONTIN) 300 mg capsule Take 300 mg by mouth three (3) times daily. cholecalciferol (VITAMIN D3) 1,000 unit cap Take 1,000 Units by mouth daily. methadone (DOLOPHINE) 10 mg tablet Take 10 mg by mouth four (4) times daily. !! - Potential duplicate medications found. Please discuss with provider. 2.   Follow-up Information    None       3. Return to ED if worse   4. Current Discharge Medication List          Diagnosis     Clinical Impression: No diagnosis found. Attestations:    Verner Kanner, MD    Please note that this dictation was completed with [employer], the [employer] voice recognition software. Quite often unanticipated grammatical, syntax, homophones, and other interpretive errors are inadvertently transcribed by the computer software. Please disregard these errors. Please excuse any errors that have escaped final proofreading. Thank you.

## 2021-03-19 ENCOUNTER — ANESTHESIA (OUTPATIENT)
Dept: ENDOSCOPY | Age: 76
DRG: 813 | End: 2021-03-19
Payer: MEDICARE

## 2021-03-19 ENCOUNTER — APPOINTMENT (OUTPATIENT)
Dept: VASCULAR SURGERY | Age: 76
DRG: 813 | End: 2021-03-19
Attending: NURSE PRACTITIONER
Payer: MEDICARE

## 2021-03-19 ENCOUNTER — ANESTHESIA EVENT (OUTPATIENT)
Dept: ENDOSCOPY | Age: 76
DRG: 813 | End: 2021-03-19
Payer: MEDICARE

## 2021-03-19 LAB
ANION GAP SERPL CALC-SCNC: 8 MMOL/L (ref 5–15)
BASOPHILS # BLD: 0.1 K/UL (ref 0–0.2)
BASOPHILS NFR BLD: 1 % (ref 0–2.5)
BUN SERPL-MCNC: 53 MG/DL (ref 6–20)
BUN/CREAT SERPL: 21 (ref 12–20)
CA-I BLD-MCNC: 8.5 MG/DL (ref 8.5–10.1)
CHLORIDE SERPL-SCNC: 105 MMOL/L (ref 97–108)
CO2 SERPL-SCNC: 26 MMOL/L (ref 21–32)
CREAT SERPL-MCNC: 2.5 MG/DL (ref 0.7–1.3)
ECHO AO ROOT DIAM: 3.89 CM
ECHO AV MEAN GRADIENT: 3.13 MMHG
ECHO AV MEAN VELOCITY: 82.08 CM/S
ECHO AV PEAK GRADIENT: 6.11 MMHG
ECHO AV PEAK VELOCITY: 123.61 CM/S
ECHO AV VTI: 16.39 CM
ECHO LA VOL 2C: 23.23 ML (ref 18–58)
ECHO LA VOL 4C: 26.86 ML (ref 18–58)
ECHO LA VOL BP: 28.6 ML (ref 18–58)
ECHO LA VOL BP: 28.6 ML (ref 18–58)
ECHO LV EDV A2C: 105.76 ML
ECHO LV EDV A2C: 105.76 ML
ECHO LV EDV BP: 71.54 ML (ref 67–155)
ECHO LV EDV BP: 71.54 ML (ref 67–155)
ECHO LV EJECTION FRACTION A2C: 72 PERCENT
ECHO LV EJECTION FRACTION A2C: 72 PERCENT
ECHO LV EJECTION FRACTION A4C: 50 PERCENT
ECHO LV EJECTION FRACTION A4C: 50 PERCENT
ECHO LV EJECTION FRACTION BIPLANE: 63.8 PERCENT (ref 55–100)
ECHO LV EJECTION FRACTION BIPLANE: 63.8 PERCENT (ref 55–100)
ECHO LV ESV A2C: 15.64 ML
ECHO LV ESV BP: 25.87 ML (ref 22–58)
ECHO LV ESV BP: 25.87 ML (ref 22–58)
ECHO LV INTERNAL DIMENSION DIASTOLIC: 4.77 CM (ref 4.2–5.9)
ECHO LV INTERNAL DIMENSION SYSTOLIC: 4.11 CM
ECHO LV IVSD: 1.2 CM (ref 0.6–1)
ECHO LV MASS 2D: 213.1 G (ref 88–224)
ECHO LV POSTERIOR WALL DIASTOLIC: 1.17 CM (ref 0.6–1)
ECHO LVOT PEAK GRADIENT: 2.72 MMHG
ECHO LVOT PEAK VELOCITY: 82.5 CM/S
ECHO LVOT SV: 39 ML
ECHO LVOT SV: 39 ML
ECHO LVOT VTI: 14.79 CM
ECHO MV A VELOCITY: 109.63 CM/S
ECHO MV AREA PHT: 3.38 CM2
ECHO MV AREA PHT: 3.38 CM2
ECHO MV E DECELERATION TIME (DT): 224.16 MS
ECHO MV E VELOCITY: 77.35 CM/S
ECHO MV E/A RATIO: 0.71
ECHO MV PRESSURE HALF TIME (PHT): 65.01 MS
ECHO RV INTERNAL DIMENSION: 3.54 CM
EOSINOPHIL # BLD: 0 K/UL (ref 0–0.7)
EOSINOPHIL NFR BLD: 0 % (ref 0.9–2.9)
ERYTHROCYTE [DISTWIDTH] IN BLOOD BY AUTOMATED COUNT: 17.7 % (ref 11.5–14.5)
GLUCOSE BLD STRIP.AUTO-MCNC: 133 MG/DL (ref 65–100)
GLUCOSE BLD STRIP.AUTO-MCNC: 137 MG/DL (ref 65–100)
GLUCOSE BLD STRIP.AUTO-MCNC: 143 MG/DL (ref 65–100)
GLUCOSE BLD STRIP.AUTO-MCNC: 408 MG/DL (ref 65–100)
GLUCOSE SERPL-MCNC: 163 MG/DL (ref 65–100)
HCT VFR BLD AUTO: 22.1 % (ref 41–53)
HCT VFR BLD AUTO: 22.4 % (ref 41–53)
HCT VFR BLD AUTO: 24 % (ref 41–53)
HGB BLD-MCNC: 7.2 G/DL (ref 13.5–17.5)
HGB BLD-MCNC: 7.2 G/DL (ref 13.5–17.5)
HGB BLD-MCNC: 7.5 G/DL (ref 13.5–17.5)
IRON SATN MFR SERPL: 8 % (ref 20–50)
IRON SERPL-MCNC: 26 UG/DL (ref 35–150)
LVOT MG: 1.28 MMHG
LYMPHOCYTES # BLD: 0.7 K/UL (ref 1–4.8)
LYMPHOCYTES NFR BLD: 10 % (ref 20.5–51.1)
MCH RBC QN AUTO: 31 PG (ref 31–34)
MCHC RBC AUTO-ENTMCNC: 32.3 G/DL (ref 31–36)
MCV RBC AUTO: 96 FL (ref 80–100)
MONOCYTES # BLD: 0.3 K/UL (ref 0.2–2.4)
MONOCYTES NFR BLD: 5 % (ref 1.7–9.3)
NEUTS SEG # BLD: 6.3 K/UL (ref 1.8–7.7)
NEUTS SEG NFR BLD: 84 % (ref 42–75)
NRBC # BLD: 0.01 K/UL
NRBC BLD-RTO: 0.1 PER 100 WBC
PERFORMED BY, TECHID: ABNORMAL
PLATELET # BLD AUTO: 455 K/UL
PMV BLD AUTO: 7.3 FL (ref 6.5–11.5)
POTASSIUM SERPL-SCNC: 4.8 MMOL/L (ref 3.5–5.1)
PROCALCITONIN SERPL-MCNC: <0.05 NG/ML
RBC # BLD AUTO: 2.34 M/UL (ref 4.5–5.9)
SODIUM SERPL-SCNC: 139 MMOL/L (ref 136–145)
TIBC SERPL-MCNC: 315 UG/DL (ref 250–450)
WBC # BLD AUTO: 7.4 K/UL (ref 4.4–11.3)

## 2021-03-19 PROCEDURE — 74011250637 HC RX REV CODE- 250/637: Performed by: NURSE PRACTITIONER

## 2021-03-19 PROCEDURE — 88305 TISSUE EXAM BY PATHOLOGIST: CPT

## 2021-03-19 PROCEDURE — 74011000258 HC RX REV CODE- 258: Performed by: NURSE ANESTHETIST, CERTIFIED REGISTERED

## 2021-03-19 PROCEDURE — 82962 GLUCOSE BLOOD TEST: CPT

## 2021-03-19 PROCEDURE — 74011636637 HC RX REV CODE- 636/637: Performed by: NURSE PRACTITIONER

## 2021-03-19 PROCEDURE — 85014 HEMATOCRIT: CPT

## 2021-03-19 PROCEDURE — 74011250636 HC RX REV CODE- 250/636: Performed by: NURSE PRACTITIONER

## 2021-03-19 PROCEDURE — 93306 TTE W/DOPPLER COMPLETE: CPT

## 2021-03-19 PROCEDURE — 43239 EGD BIOPSY SINGLE/MULTIPLE: CPT | Performed by: INTERNAL MEDICINE

## 2021-03-19 PROCEDURE — 76060000031 HC ANESTHESIA FIRST 0.5 HR: Performed by: INTERNAL MEDICINE

## 2021-03-19 PROCEDURE — 74011000250 HC RX REV CODE- 250: Performed by: NURSE PRACTITIONER

## 2021-03-19 PROCEDURE — 74011000258 HC RX REV CODE- 258: Performed by: NURSE PRACTITIONER

## 2021-03-19 PROCEDURE — 83540 ASSAY OF IRON: CPT

## 2021-03-19 PROCEDURE — 0DB68ZX EXCISION OF STOMACH, VIA NATURAL OR ARTIFICIAL OPENING ENDOSCOPIC, DIAGNOSTIC: ICD-10-PCS | Performed by: INTERNAL MEDICINE

## 2021-03-19 PROCEDURE — 76040000019: Performed by: INTERNAL MEDICINE

## 2021-03-19 PROCEDURE — 94640 AIRWAY INHALATION TREATMENT: CPT

## 2021-03-19 PROCEDURE — C9113 INJ PANTOPRAZOLE SODIUM, VIA: HCPCS | Performed by: NURSE PRACTITIONER

## 2021-03-19 PROCEDURE — 2709999900 HC NON-CHARGEABLE SUPPLY: Performed by: INTERNAL MEDICINE

## 2021-03-19 PROCEDURE — 74011000250 HC RX REV CODE- 250: Performed by: NURSE ANESTHETIST, CERTIFIED REGISTERED

## 2021-03-19 PROCEDURE — 77030021593 HC FCPS BIOP ENDOSC BSC -A: Performed by: INTERNAL MEDICINE

## 2021-03-19 PROCEDURE — 36415 COLL VENOUS BLD VENIPUNCTURE: CPT

## 2021-03-19 PROCEDURE — 99223 1ST HOSP IP/OBS HIGH 75: CPT | Performed by: INTERNAL MEDICINE

## 2021-03-19 PROCEDURE — 85025 COMPLETE CBC W/AUTO DIFF WBC: CPT

## 2021-03-19 PROCEDURE — 74011250636 HC RX REV CODE- 250/636: Performed by: NURSE ANESTHETIST, CERTIFIED REGISTERED

## 2021-03-19 PROCEDURE — 94760 N-INVAS EAR/PLS OXIMETRY 1: CPT

## 2021-03-19 PROCEDURE — 65270000029 HC RM PRIVATE

## 2021-03-19 PROCEDURE — 80048 BASIC METABOLIC PNL TOTAL CA: CPT

## 2021-03-19 RX ORDER — INSULIN LISPRO 100 [IU]/ML
4 INJECTION, SOLUTION INTRAVENOUS; SUBCUTANEOUS ONCE
Status: COMPLETED | OUTPATIENT
Start: 2021-03-19 | End: 2021-03-19

## 2021-03-19 RX ORDER — PANTOPRAZOLE SODIUM 40 MG/1
40 TABLET, DELAYED RELEASE ORAL
Status: DISCONTINUED | OUTPATIENT
Start: 2021-03-20 | End: 2021-03-22 | Stop reason: HOSPADM

## 2021-03-19 RX ORDER — SODIUM CHLORIDE 9 MG/ML
INJECTION, SOLUTION INTRAVENOUS
Status: DISCONTINUED | OUTPATIENT
Start: 2021-03-19 | End: 2021-03-19 | Stop reason: HOSPADM

## 2021-03-19 RX ORDER — KETAMINE HYDROCHLORIDE 10 MG/ML
INJECTION, SOLUTION INTRAMUSCULAR; INTRAVENOUS AS NEEDED
Status: DISCONTINUED | OUTPATIENT
Start: 2021-03-19 | End: 2021-03-19 | Stop reason: HOSPADM

## 2021-03-19 RX ORDER — ALBUTEROL SULFATE 0.83 MG/ML
2.5 SOLUTION RESPIRATORY (INHALATION)
Status: DISCONTINUED | OUTPATIENT
Start: 2021-03-19 | End: 2021-03-22 | Stop reason: HOSPADM

## 2021-03-19 RX ORDER — PROPOFOL 10 MG/ML
INJECTION, EMULSION INTRAVENOUS AS NEEDED
Status: DISCONTINUED | OUTPATIENT
Start: 2021-03-19 | End: 2021-03-19 | Stop reason: HOSPADM

## 2021-03-19 RX ADMIN — PREDNISONE 20 MG: 20 TABLET ORAL at 08:22

## 2021-03-19 RX ADMIN — SODIUM CHLORIDE: 9 INJECTION INTRAVENOUS at 14:10

## 2021-03-19 RX ADMIN — INSULIN GLARGINE 60 UNITS: 100 INJECTION, SOLUTION SUBCUTANEOUS at 21:07

## 2021-03-19 RX ADMIN — PIPERACILLIN AND TAZOBACTAM 3.38 G: 3; .375 INJECTION, POWDER, LYOPHILIZED, FOR SOLUTION INTRAVENOUS at 15:40

## 2021-03-19 RX ADMIN — ATORVASTATIN CALCIUM 10 MG: 10 TABLET, FILM COATED ORAL at 21:07

## 2021-03-19 RX ADMIN — IPRATROPIUM BROMIDE AND ALBUTEROL SULFATE 3 ML: .5; 3 SOLUTION RESPIRATORY (INHALATION) at 20:43

## 2021-03-19 RX ADMIN — PIPERACILLIN AND TAZOBACTAM 3.38 G: 3; .375 INJECTION, POWDER, LYOPHILIZED, FOR SOLUTION INTRAVENOUS at 08:21

## 2021-03-19 RX ADMIN — IPRATROPIUM BROMIDE AND ALBUTEROL SULFATE 3 ML: .5; 3 SOLUTION RESPIRATORY (INHALATION) at 12:05

## 2021-03-19 RX ADMIN — IPRATROPIUM BROMIDE AND ALBUTEROL SULFATE 3 ML: .5; 3 SOLUTION RESPIRATORY (INHALATION) at 15:41

## 2021-03-19 RX ADMIN — INSULIN LISPRO 10 UNITS: 100 INJECTION, SOLUTION INTRAVENOUS; SUBCUTANEOUS at 21:08

## 2021-03-19 RX ADMIN — INSULIN LISPRO 4 UNITS: 100 INJECTION, SOLUTION INTRAVENOUS; SUBCUTANEOUS at 21:08

## 2021-03-19 RX ADMIN — PROPOFOL 100 MG: 10 INJECTION, EMULSION INTRAVENOUS at 14:17

## 2021-03-19 RX ADMIN — PANTOPRAZOLE SODIUM 40 MG: 40 INJECTION, POWDER, FOR SOLUTION INTRAVENOUS at 08:21

## 2021-03-19 RX ADMIN — GABAPENTIN 300 MG: 300 CAPSULE ORAL at 08:22

## 2021-03-19 RX ADMIN — GABAPENTIN 300 MG: 300 CAPSULE ORAL at 15:43

## 2021-03-19 RX ADMIN — AMLODIPINE BESYLATE 5 MG: 5 TABLET ORAL at 08:22

## 2021-03-19 RX ADMIN — PIPERACILLIN AND TAZOBACTAM 3.38 G: 3; .375 INJECTION, POWDER, LYOPHILIZED, FOR SOLUTION INTRAVENOUS at 00:31

## 2021-03-19 RX ADMIN — INSULIN LISPRO 3 UNITS: 100 INJECTION, SOLUTION INTRAVENOUS; SUBCUTANEOUS at 08:37

## 2021-03-19 RX ADMIN — Medication 30 MG: at 14:12

## 2021-03-19 RX ADMIN — Medication 5 MG: at 21:07

## 2021-03-19 RX ADMIN — IPRATROPIUM BROMIDE AND ALBUTEROL SULFATE 3 ML: .5; 3 SOLUTION RESPIRATORY (INHALATION) at 08:05

## 2021-03-19 RX ADMIN — GABAPENTIN 300 MG: 300 CAPSULE ORAL at 21:07

## 2021-03-19 RX ADMIN — PROPOFOL 100 MG: 10 INJECTION, EMULSION INTRAVENOUS at 14:12

## 2021-03-19 NOTE — PROGRESS NOTES
Consent signed for EGD procedure    4016:  Off unit for EGD    1436: Arrived back on the unit from EGD

## 2021-03-19 NOTE — H&P
Patient: Eryn Nelson MRN: [id number]  SSN: [id number]    YOB: 1945  Age: 70 y.o. Sex: male      History and Physical    Eryn Nelson is a 70 y.o. male having Procedure(s):  ESOPHAGOGASTRODUODENOSCOPY (EGD). Allergies: No Known Allergies     Chief Complaint: GI Bleed    History of Present Illness:see below    Past Medical History:   Diagnosis Date    Asthma     B12 deficiency     CAD (coronary artery disease)     Cataract     Chronic obstructive pulmonary disease (HCC)     CKD (chronic kidney disease)     COVID-19     Depression     Diabetes (HCC)     DJD (degenerative joint disease)     [facility] attack ([facility]     Hypertension     Long term prescription opiate use     Neuropathy     Rheumatoid arthritis ([facility]     Ulcer       Past Surgical History:   Procedure Laterality Date    HX ANGIOPLASTY      HX HEENT      HX ORTHOPAEDIC      RI CARDIAC SURG PROCEDURE UNLIST      RCA stent      Family History   Problem Relation Age of Onset    Diabetes Mother     Asthma Mother     Diabetes Father       Social History     Tobacco Use    Smoking status: Former Smoker    Smokeless tobacco: Former User   Substance Use Topics    Alcohol use: No        Prior to Admission medications    Medication Sig Start Date End Date Taking? Authorizing Provider   albuterol (PROVENTIL HFA, VENTOLIN HFA, PROAIR HFA) 90 mcg/actuation inhaler TAKE 2 PUFFS BY MOUTH EVERY 4 HOURS AS NEEDED FOR WHEEZE 3/4/21  Yes Other, MD Carrie   apixaban (Eliquis) 2.5 mg tablet Take 2.5 mg by mouth two (2) times a day. 3/4/21  Yes Other, MD Carrie   atorvastatin (LIPITOR) 10 mg tablet Take 10 mg by mouth At bedtime.    Yes Other, MD Carrie   HumaLOG KwikPen Insulin 100 unit/mL kwikpen INJECT 26 UNITS UNDER THE SKIN IN THE MORNING AND 22 UNITS AT SUPPER 2/5/21  Yes Other, MD Carrie   losartan (COZAAR) 50 mg tablet TAKE 1 TABLET BY MOUTH EVERY DAY 12/24/20  Yes Other, MD Carrie   amoxicillin-clavulanate (Augmentin) 875-125 mg per tablet Take 1 Tab by mouth two (2) times a day for 10 days. 3/11/21 3/21/21 Yes Hetal Mcdermott MD   amLODIPine (NORVASC) 5 mg tablet Take 1 Tab by mouth daily for 30 days. 2/18/21 3/20/21 Yes Tamala Dakins, MD   ascorbic acid, vitamin C, (VITAMIN C) 1,000 mg tablet Take 1 Tab by mouth two (2) times a day for 30 days. 2/17/21 3/19/21 Yes Tamala Dakins, MD   zinc sulfate (ZINCATE) 220 (50) mg capsule Take 1 Cap by mouth daily for 30 days. 2/18/21 3/20/21 Yes Tamala Dakins, MD   insulin aspart (NOVOLOG) 100 unit/mL injection 26 Units by SubCUTAneous route Every morning. Yes Carrie Schuster MD   aspirin delayed-release 81 mg tablet Take 81 mg by mouth daily. Yes Carrie Schuster MD   ondansetron (ZOFRAN ODT) 4 mg disintegrating tablet Take 1 Tab by mouth every eight (8) hours as needed for Nausea. 7/11/16  Yes Kalina Singleton PA-C   acetaminophen (TYLENOL) 500 mg tablet TAKE 2 TABLETS BY MOUTH EVERY 6 (SIX) HOURS AS NEEDED FOR PAIN 12/7/20   Carrie Schuster MD   cetirizine-pseudoePHEDrine (ZyrTEC-D) 5-120 mg Tb12 Take 1 Tab by mouth two (2) times daily as needed. 1/20/21   Carrie Schuster MD   insulin lispro (HUMALOG) 100 unit/mL kwikpen 22 Units by SubCUTAneous route. 3/4/21   Carrie Schuster MD   insulin lispro (HUMALOG) 100 unit/mL injection by SubCUTAneous route. Landen, MD Carrie   omeprazole (PRILOSEC) 40 mg capsule Take 40 mg by mouth daily. Carrie Schuster MD   traMADol (ULTRAM) 50 mg tablet Take 50 mg by mouth every six (6) hours as needed for Pain. Carrie Schuster MD   insulin glargine (LANTUS) 100 unit/mL injection 60 Units by SubCUTAneous route nightly. Carrie Schuster MD   gabapentin (NEURONTIN) 300 mg capsule Take 300 mg by mouth three (3) times daily. Carrie Schuster MD   cholecalciferol (VITAMIN D3) 1,000 unit cap Take 1,000 Units by mouth daily. Other, MD Carrie   methadone (DOLOPHINE) 10 mg tablet Take 10 mg by mouth four (4) times daily.     Carrie Schuster MD        Visit Vitals  BP 137/78   Pulse (!) 109   Temp 36.8 °C (98.3 °F)   Resp 23   Wt 91.4 kg (201 lb 7 oz)   SpO2 94%   BMI 30.63 kg/m²        Assessment and Plan:   Sarita Trinidad is a 70 y.o. male having Procedure(s):  ESOPHAGOGASTRODUODENOSCOPY (EGD)    Preanesthesia Evaluation     Last edited 03/19/21 1355 by Cece Tomlin CRNA  Date of Service 03/19/21 134  Status: Signed             Relevant Problems   No relevant active problems       Anesthetic History   No history of anesthetic complications  Other anesthesia complications          Review of Systems / Medical History  Patient summary reviewed, nursing notes reviewed and pertinent labs reviewed    Pulmonary  Within defined limits                 Neuro/Psych   Within defined limits           Cardiovascular  Within defined limits                     GI/Hepatic/Renal  Within defined limits              Endo/Other  Within defined limits           Other Findings              Physical Exam    Airway  Mallampati: II    Neck ROM: normal range of motion        Cardiovascular    Rhythm: regular  Rate: normal         Dental    Dentition: Edentulous     Pulmonary  Breath sounds clear to auscultation               Abdominal  Abdominal exam normal       Other Findings            Anesthetic Plan    ASA: 3  Anesthesia type: total IV anesthesia            Anesthetic plan and risks discussed with: Patient               Preanesthesia evaluation performed by Cece Tomlin CRNA            Signed By: Kelly Good CRNA     March 19, 2021

## 2021-03-19 NOTE — PROGRESS NOTES
Progress Note  Date:3/19/2021       Room:214/01  Patient Severiano Pickler     YOB: 1945     Age:75 y.o. Subjective    HPI:  Patient is a 70year old male with history of Diabetes, hypertension, COPD and CKD stage III who presents to the emergency department today with complaints of shortness of breath. Of note he was diagnosed with Covid-19 on 02/09/202. [place] [facility] [place]. After discharge from the hospital, his PCP started him on Eliquis for PE prophylaxis secondary to Covid-19. His stool was positive for occult blood in ER. He has had two other emergency room encounters since discharge for shortness of breath. His hemoglobin noted to have slowly trended down since 02/09/2021. His Hemoglobin today is 7.9 from 11.3 on 02/09/21. Chest xray in ER today shows bilateral opacities not changed from prior. Although he is short of breath his oxygen saturation is 96% on room air. He will be admitted for further management. On follow-up patient seen and evaluated currently is n.p.o. and discussed with Dr. Alecia Cordova and he will with patient as add-on for an EGD. Patient has downward trend and hemoglobin and continues to be low at 7.2. Patient was on Eliquis and stopped 3 days prior. Denies any shortness of breath and no further wheezing noted. Discussed with patient about possible blood transfusion and initially had said no but then said that for me to do what I need to do to get him better. Review of Systems   Constitutional: Negative for chills, diaphoresis, fatigue and fever. HENT: Negative for congestion, ear pain, postnasal drip, sinus pain and sore throat. Eyes: Negative for pain, discharge and redness. Respiratory: Negative for cough, shortness of breath and wheezing. Cardiovascular: Negative for chest pain and palpitations. Gastrointestinal: Negative for abdominal pain, constipation, diarrhea, nausea and vomiting.    Genitourinary: Negative for dysuria, flank pain, frequency and urgency. Musculoskeletal: Negative for arthralgias and myalgias. Neurological: Negative for dizziness, weakness and headaches. Psychiatric/Behavioral: Negative for agitation and hallucinations. The patient is not nervous/anxious. Objective           Vitals Last 24 Hours:  Patient Vitals for the past 24 hrs:   Temp Pulse Resp BP SpO2   03/19/21 0805     95 %   03/19/21 0756 98.8 °F (37.1 °C) 90 18 (!) 143/71 96 %   03/19/21 0430 98.2 °F (36.8 °C) 91 18 (!) 155/76 98 %   03/18/21 2317 98 °F (36.7 °C) 93 18 (!) 156/89 97 %   03/18/21 2013     96 %   03/18/21 1813  95 18 (!) 155/80 96 %   03/18/21 1632  97 19 122/76 96 %   03/18/21 1531     98 %   03/18/21 1501  93 18 120/74 96 %   03/18/21 1417     97 %   03/18/21 1406 98.4 °F (36.9 °C) (!) 101 17 131/78 97 %        I/O (24Hr): Intake/Output Summary (Last 24 hours) at 3/19/2021 0955  Last data filed at 3/19/2021 0432  Gross per 24 hour   Intake    Output 600 ml   Net -600 ml       Physical Exam:  General: Alert, cooperative, no distress, appears stated age. Head:  Normocephalic, without obvious abnormality, atraumatic. Eyes:  Conjunctivae/corneas clear. Pupils equal, round, reactive to light. Extraocular movements intact. Lungs:  Clear to auscultation bilaterally. no wheeze, rales, crackles, rhonchi   Chest wall: No tenderness or deformity. Heart:  Regular rate and rhythm, S1, S2 normal, no murmur, click, rub or gallop. Abdomen:  Soft, non-tender. Bowel sounds normal. No masses,  No organomegaly. Extremities: Extremities normal, atraumatic, no cyanosis or edema. Pulses: 2+ and symmetric all extremities. Skin: Skin color, texture, turgor normal. No rashes or lesions  Neurologic: Awake, Alert, oriented.  No obvious gross sensory or motor deficits      Medications           Current Facility-Administered Medications   Medication Dose Route Frequency    albuterol (PROVENTIL VENTOLIN) nebulizer solution 2.5 mg  2.5 mg Nebulization Q6H PRN    acetaminophen (TYLENOL) tablet 650 mg  650 mg Oral Q6H PRN    amLODIPine (NORVASC) tablet 5 mg  5 mg Oral DAILY    atorvastatin (LIPITOR) tablet 10 mg  10 mg Oral QHS    gabapentin (NEURONTIN) capsule 300 mg  300 mg Oral TID    insulin glargine (LANTUS) injection 60 Units  60 Units SubCUTAneous QHS    glucose chewable tablet 16 g  4 Tab Oral PRN    dextrose (D50W) injection syrg 12.5-25 g  25-50 mL IntraVENous PRN    glucagon (GLUCAGEN) injection 1 mg  1 mg IntraMUSCular PRN    insulin lispro (HUMALOG) injection   SubCUTAneous AC&HS    ondansetron (ZOFRAN) injection 4 mg  4 mg IntraVENous Q8H PRN    pantoprazole (PROTONIX) 40 mg in 0.9% sodium chloride 10 mL injection  40 mg IntraVENous Q12H    piperacillin-tazobactam (ZOSYN) 3.375 g in 0.9% sodium chloride (MBP/ADV) 100 mL MBP  3.375 g IntraVENous Q8H    predniSONE (DELTASONE) tablet 20 mg  20 mg Oral DAILY WITH BREAKFAST    melatonin (rapid dissolve) tablet 5 mg  5 mg Oral QHS PRN    albuterol-ipratropium (DUO-NEB) 2.5 MG-0.5 MG/3 ML  3 mL Nebulization QID RT         Allergies         Patient has no known allergies.        Labs/Imaging/Diagnostics      Labs:  Recent Results (from the past 48 hour(s))   COVID-19 RAPID TEST    Collection Time: 03/18/21  2:30 PM   Result Value Ref Range    Specimen source Nasopharyngeal      COVID-19 rapid test Not Detected Not Detected     CBC WITH AUTOMATED DIFF    Collection Time: 03/18/21  3:03 PM   Result Value Ref Range    WBC 9.7 4.4 - 11.3 K/uL    RBC 2.53 (L) 4.50 - 5.90 M/uL    HGB 7.9 (L) 13.5 - 17.5 g/dL    HCT 24.4 (L) 41 - 53 %    MCV 96.4 80 - 100 FL    MCH 31.1 31 - 34 PG    MCHC 32.3 31.0 - 36.0 g/dL    RDW 18.0 (H) 11.5 - 14.5 %    PLATELET 210 K/uL    MPV 7.6 6.5 - 11.5 FL    NRBC 0.0  WBC    ABSOLUTE NRBC 0.00 K/uL    NEUTROPHILS 88 (H) 42 - 75 %    LYMPHOCYTES 7 (L) 20.5 - 51.1 %    MONOCYTES 4 1.7 - 9.3 %    EOSINOPHILS 0 (L) 0.9 - 2.9 % BASOPHILS 1 0.0 - 2.5 %    ABS. NEUTROPHILS 8.6 (H) 1.8 - 7.7 K/UL    ABS. LYMPHOCYTES 0.6 (L) 1.0 - 4.8 K/UL    ABS. MONOCYTES 0.4 0.2 - 2.4 K/UL    ABS. EOSINOPHILS 0.0 0.0 - 0.7 K/UL    ABS. BASOPHILS 0.1 0.0 - 0.2 K/UL   PROTHROMBIN TIME + INR    Collection Time: 03/18/21  3:03 PM   Result Value Ref Range    Prothrombin time 9.6 9.0 - 11.1 sec    INR 1.0 0.9 - 1.1     TROPONIN I    Collection Time: 03/18/21  3:03 PM   Result Value Ref Range    Troponin-I, Qt. <0.05 <0.05 ng/mL   MAGNESIUM    Collection Time: 03/18/21  3:03 PM   Result Value Ref Range    Magnesium 2.1 1.6 - 2.4 mg/dL   METABOLIC PANEL, COMPREHENSIVE    Collection Time: 03/18/21  3:03 PM   Result Value Ref Range    Sodium 141 136 - 145 mmol/L    Potassium 4.8 3.5 - 5.1 mmol/L    Chloride 106 97 - 108 mmol/L    CO2 26 21 - 32 mmol/L    Anion gap 9 5 - 15 mmol/L    Glucose 198 (H) 65 - 100 mg/dL    BUN 48 (H) 6 - 20 mg/dL    Creatinine 2.66 (H) 0.70 - 1.30 mg/dL    BUN/Creatinine ratio 18 12 - 20      GFR est AA 29 (L) >60 ml/min/1.73m2    GFR est non-AA 24 (L) >60 ml/min/1.73m2    Calcium 8.8 8.5 - 10.1 mg/dL    Bilirubin, total 0.2 0.2 - 1.0 mg/dL    AST (SGOT) 23 15 - 37 U/L    ALT (SGPT) 38 12 - 78 U/L    Alk.  phosphatase 76 45 - 117 U/L    Protein, total 7.0 6.4 - 8.2 g/dL    Albumin 2.6 (L) 3.5 - 5.0 g/dL    Globulin 4.4 (H) 2.0 - 4.0 g/dL    A-G Ratio 0.6 (L) 1.1 - 2.2     BNP    Collection Time: 03/18/21  3:03 PM   Result Value Ref Range    NT pro- (H) <125 pg/mL   GLUCOSE, POC    Collection Time: 03/18/21  9:53 PM   Result Value Ref Range    Glucose (POC) 389 (H) 65 - 100 mg/dL    Performed by KEITH SAHA    HGB & HCT    Collection Time: 03/18/21 11:00 PM   Result Value Ref Range    HGB 7.5 (L) 13.5 - 17.5 g/dL    HCT 23.4 (L) 41 - 53 %   CBC WITH AUTOMATED DIFF    Collection Time: 03/19/21  5:29 AM   Result Value Ref Range    WBC 7.4 4.4 - 11.3 K/uL    RBC 2.34 (L) 4.50 - 5.90 M/uL    HGB 7.2 (L) 13.5 - 17.5 g/dL    HCT 22.4 (L) 41 - 53 %    MCV 96.0 80 - 100 FL    MCH 31.0 31 - 34 PG    MCHC 32.3 31.0 - 36.0 g/dL    RDW 17.7 (H) 11.5 - 14.5 %    PLATELET 996 K/uL    MPV 7.3 6.5 - 11.5 FL    NRBC 0.1  WBC    ABSOLUTE NRBC 0.01 K/uL    NEUTROPHILS 84 (H) 42 - 75 %    LYMPHOCYTES 10 (L) 20.5 - 51.1 %    MONOCYTES 5 1.7 - 9.3 %    EOSINOPHILS 0 (L) 0.9 - 2.9 %    BASOPHILS 1 0.0 - 2.5 %    ABS. NEUTROPHILS 6.3 1.8 - 7.7 K/UL    ABS. LYMPHOCYTES 0.7 (L) 1.0 - 4.8 K/UL    ABS. MONOCYTES 0.3 0.2 - 2.4 K/UL    ABS. EOSINOPHILS 0.0 0.0 - 0.7 K/UL    ABS. BASOPHILS 0.1 0.0 - 0.2 K/UL   METABOLIC PANEL, BASIC    Collection Time: 03/19/21  5:29 AM   Result Value Ref Range    Sodium 139 136 - 145 mmol/L    Potassium 4.8 3.5 - 5.1 mmol/L    Chloride 105 97 - 108 mmol/L    CO2 26 21 - 32 mmol/L    Anion gap 8 5 - 15 mmol/L    Glucose 163 (H) 65 - 100 mg/dL    BUN 53 (H) 6 - 20 mg/dL    Creatinine 2.50 (H) 0.70 - 1.30 mg/dL    BUN/Creatinine ratio 21 (H) 12 - 20      GFR est AA 31 (L) >60 ml/min/1.73m2    GFR est non-AA 25 (L) >60 ml/min/1.73m2    Calcium 8.5 8.5 - 10.1 mg/dL   GLUCOSE, POC    Collection Time: 03/19/21  7:55 AM   Result Value Ref Range    Glucose (POC) 143 (H) 65 - 100 mg/dL    Performed by Tye Earthly         Imaging:  Xr Chest Port    Result Date: 3/18/2021  Overall similar appearance of bilateral opacities.        Assessment//Plan           Problem List:  Hospital Problems  Never Reviewed          Codes Class Noted POA    GI bleed ICD-10-CM: K92.2  ICD-9-CM: 578.9  3/18/2021 Unknown        COPD [facility] ICD-10-CM: J44.9  ICD-9-CM: 739  2/13/2021 Yes        CKD (chronic kidney disease) ICD-10-CM: N18.9  ICD-9-CM: 585.9  2/13/2021 Yes              Acute Blood Loss Anemia  - Likely secondary to anticoagulant Eliquis  - Stool is positive for occult blood  - Hemoglobin is 7.9 today from 11.3 on 02/09/21 but on repeat checks continues to be on downward trend 7.5 -> 7.2   - follow up repeat h/h at 12pm   - If hemoglobin drops below 7 will transfuse and will discuss with the patient once again if transfusion is needed.      GI Bleed   - Likely secondary to Eliquis therapy started by PCP secondary to recent Covid infection and patient stopped taking medication about 2 to 3 days back  - has hx of gastritis / gastric ulcers on EGD done several years back but states is has been stable. - Stop Eliquis and will trend hemoglobin level Q 6 hours  - Transfuse if hemoglobin less than 7  - Start Protonix 40 mg IV BID  Clear liquid diet and NP past midnight  - discussed with Dr. Luis Angel Moscoso and he was able to add patient on today for EGD, follow up results       Bilateral Lobe Pneumonia  - Likely residual for recent covid-19 infection  - Rapid Covid-19 result is negative today  Initially started on broad spectrum antiiotic Zosyn  -Procalcitonin pending and if normal discontinue Zosyn     COPD exacerbation  He quit smoking 3 months ago  Has some mild scattered wheezes bilaterlly on admission  Continue Duonebs and PO prednisone  -Order O2 saturation and keep O2 greater than 92%     Recent history of Covid-19 infection  - Rapid covid-19 test negative today and chest x-ray still shows similar bilateral airspace opacities  - Continue prednisone 20 mg daily     Hypertension  - Restart Amlodipine and losartan since creatinine is at baseline for his chronic kidney disease stage III  - vitals q4hrs      Diabetes Type 2  - A1c was 9.6% on 02/13/21  - Start SSI and lantus at bedtime of 60 units  - Accu check ACHS  - nutrition evaluation     CKD stage III  Creatinine is 2.66, no significant change from prior. Avoid nephrotoxic medications     DVT prophylaxis  Continue to get secondary to blood loss anemia    Full Code     Spent 30 minutes evaluting and coordinating patient care of which >50% was spent coordinating and counseling.        Electronically signed by Yue Pond MD on 3/19/2021 at 9:51 AM

## 2021-03-19 NOTE — OP NOTES
EGD Procedure Note        Patient: Mary Matta MRN: [id number]  SSN: [id number]    YOB: 1945  Age: 70 y.o. Sex: male        Date/Time:  3/19/2021 2:23 PM         IMPRESSION:       1. Antral gastritis  2. Angiodysplasia in the gastric body       RECOMMENDATIONS:    1. Check biopsy results. 2. Start patient on a daily PPI. 3. Patient to remain off all blood thinners and NSAIDs. 4. Have patient follow-up in the office for follow-up visit and possible further work-up. Procedure: Esophagogastroduodenoscopy with cold biopsies    Indication: GI bleeding    Endoscopist:  Patience Aden MD    Referring Provider:   Landen, MD Carrie    History: The history and physical exam were reviewed and updated. Endoscope: GIF H190 Olympus video endoscope    Extent of Exam: Second part of the duodenum    ASA: Grade 2    Anethesia/Sedation:  TIVA    Description of the procedure: The procedure was discussed with the patient including risks, benefits, alternatives including risks of iv sedation, bleeding, perforation and aspiration. A safety timeout was performed. The patient was placed in the left lateral decubitus position. A bite block was placed. The patient was using standard protocol. The patients vital signs were monitored at all times including heart rate/rhythm, blood pressure and oxygen saturation. The endoscope was then passed under direct visualization to the second part of the duodenum. The endoscope was then slowly withdrawn while visualizing the mucosa. In the stomach a retroflexion was performed and gastric fundus and cardia visualized. The patient was then transferred to recovery in stable condition. Findings:   Esophagus: The esophageal mucosa was normal with no ulceration, mass or stricture. There was no evidence of Concepcion's esophagus or reflux esophagitis  Stomach: The gastric mucosa was mildly formation in the gastric antrum.   Multiple biopsies were taken in that area. In the gastric body we saw her angiodysplasia which was nonbleeding at the time of examination. .   Duodenum: The duodenum mucosa was normal with no ulceration, mass, stricture and no evidence of villous atrophy. Therapies: None    Specimens:   ID Type Source Tests Collected by Time Destination   1 : gastric antrum mucosa Preservative Stomach, Antrum  Alen Dunne MD 3/19/2021 1417 Pathology              EBL: Minimal    Complications:   None; patient tolerated the procedure well.      Implants: None    Discharge disposition:  Out of the recovery area when discharge criteria met         Brett Rehman MD  March 19, 2021  2:23 PM

## 2021-03-19 NOTE — ANESTHESIA POSTPROCEDURE EVALUATION
Procedure(s):  ESOPHAGOGASTRODUODENOSCOPY (EGD).     total IV anesthesia    Anesthesia Post Evaluation      Multimodal analgesia: multimodal analgesia not used between 6 hours prior to anesthesia start to PACU discharge  Patient location during evaluation: PACU  Patient participation: complete - patient participated  Pain management: adequate  Anesthetic complications: no  Cardiovascular status: acceptable  Respiratory status: acceptable  Hydration status: acceptable  Post anesthesia nausea and vomiting:  none  Final Post Anesthesia Temperature Assessment:  Normothermia (36.0-37.5 degrees C)      INITIAL Post-op Vital signs:   Vitals Value Taken Time   /77 03/19/21 1429   Temp     Pulse 90 03/19/21 1429   Resp 22 03/19/21 1429   SpO2 99 % 03/19/21 1429

## 2021-03-19 NOTE — PROGRESS NOTES
Problem: Falls - Risk of  Goal: *Absence of Falls  Description: Document Clydene Bone Fall Risk and appropriate interventions in the flowsheet. Outcome: Progressing Towards Goal  Note: Fall Risk Interventions:            Medication Interventions: Teach patient to arise slowly                   Problem: Patient Education: Go to Patient Education Activity  Goal: Patient/Family Education  Outcome: Progressing Towards Goal     Problem: Fluid Volume - Risk of, Imbalanced  Goal: *Balanced intake and output  Outcome: Progressing Towards Goal     Problem: Pressure Injury - Risk of  Goal: *Prevention of pressure injury  Description: Document Austen Scale and appropriate interventions in the flowsheet.   Outcome: Progressing Towards Goal  Note: Pressure Injury Interventions:  Sensory Interventions: Assess changes in LOC         Activity Interventions: PT/OT evaluation    Mobility Interventions: PT/OT evaluation    Nutrition Interventions: Document food/fluid/supplement intake                     Problem: Patient Education: Go to Patient Education Activity  Goal: Patient/Family Education  Outcome: Progressing Towards Goal

## 2021-03-19 NOTE — PROGRESS NOTES
Problem: Falls - Risk of  Goal: *Absence of Falls  Description: Document Carline Carter Fall Risk and appropriate interventions in the flowsheet.   Outcome: Progressing Towards Goal  Note: Fall Risk Interventions:            Medication Interventions: Teach patient to arise slowly                   Problem: Patient Education: Go to Patient Education Activity  Goal: Patient/Family Education  Outcome: Progressing Towards Goal     Problem: Fluid Volume - Risk of, Imbalanced  Goal: *Balanced intake and output  Outcome: Progressing Towards Goal

## 2021-03-19 NOTE — ANESTHESIA PREPROCEDURE EVALUATION
Relevant Problems   No relevant active problems       Anesthetic History   No history of anesthetic complications  Other anesthesia complications          Review of Systems / Medical History  Patient summary reviewed, nursing notes reviewed and pertinent labs reviewed    Pulmonary  Within defined limits                 Neuro/Psych   Within defined limits           Cardiovascular  Within defined limits                     GI/Hepatic/Renal  Within defined limits              Endo/Other  Within defined limits           Other Findings              Physical Exam    Airway  Mallampati: II    Neck ROM: normal range of motion        Cardiovascular    Rhythm: regular  Rate: normal         Dental    Dentition: Edentulous     Pulmonary  Breath sounds clear to auscultation               Abdominal  Abdominal exam normal       Other Findings            Anesthetic Plan    ASA: 3  Anesthesia type: total IV anesthesia            Anesthetic plan and risks discussed with: Patient

## 2021-03-20 LAB
ANION GAP SERPL CALC-SCNC: 10 MMOL/L (ref 5–15)
BASOPHILS # BLD: 0 K/UL (ref 0–0.2)
BASOPHILS NFR BLD: 1 % (ref 0–2.5)
BUN SERPL-MCNC: 65 MG/DL (ref 6–20)
BUN/CREAT SERPL: 24 (ref 12–20)
CA-I BLD-MCNC: 8.4 MG/DL (ref 8.5–10.1)
CHLORIDE SERPL-SCNC: 104 MMOL/L (ref 97–108)
CO2 SERPL-SCNC: 26 MMOL/L (ref 21–32)
CREAT SERPL-MCNC: 2.67 MG/DL (ref 0.7–1.3)
EOSINOPHIL # BLD: 0 K/UL (ref 0–0.7)
EOSINOPHIL NFR BLD: 0 % (ref 0.9–2.9)
ERYTHROCYTE [DISTWIDTH] IN BLOOD BY AUTOMATED COUNT: 17.8 % (ref 11.5–14.5)
GLUCOSE BLD STRIP.AUTO-MCNC: 236 MG/DL (ref 65–100)
GLUCOSE BLD STRIP.AUTO-MCNC: 339 MG/DL (ref 65–100)
GLUCOSE BLD STRIP.AUTO-MCNC: 404 MG/DL (ref 65–100)
GLUCOSE BLD STRIP.AUTO-MCNC: 66 MG/DL (ref 65–100)
GLUCOSE BLD STRIP.AUTO-MCNC: 80 MG/DL (ref 65–100)
GLUCOSE SERPL-MCNC: 132 MG/DL (ref 65–100)
HCT VFR BLD AUTO: 20.7 % (ref 41–53)
HCT VFR BLD AUTO: 22.5 % (ref 41–53)
HGB BLD-MCNC: 6.8 G/DL (ref 13.5–17.5)
HGB BLD-MCNC: 7.1 G/DL (ref 13.5–17.5)
LYMPHOCYTES # BLD: 1.4 K/UL (ref 1–4.8)
LYMPHOCYTES NFR BLD: 16 % (ref 20.5–51.1)
MCH RBC QN AUTO: 31.3 PG (ref 31–34)
MCHC RBC AUTO-ENTMCNC: 32.8 G/DL (ref 31–36)
MCV RBC AUTO: 95.4 FL (ref 80–100)
MONOCYTES # BLD: 1.1 K/UL (ref 0.2–2.4)
MONOCYTES NFR BLD: 12 % (ref 1.7–9.3)
NEUTS SEG # BLD: 6.4 K/UL (ref 1.8–7.7)
NEUTS SEG NFR BLD: 71 % (ref 42–75)
NRBC # BLD: 0.02 K/UL
NRBC BLD-RTO: 0.2 PER 100 WBC
PERFORMED BY, TECHID: ABNORMAL
PERFORMED BY, TECHID: NORMAL
PERFORMED BY, TECHID: NORMAL
PLATELET # BLD AUTO: 477 K/UL
PMV BLD AUTO: 7.5 FL (ref 6.5–11.5)
POTASSIUM SERPL-SCNC: 4.5 MMOL/L (ref 3.5–5.1)
RBC # BLD AUTO: 2.17 M/UL (ref 4.5–5.9)
SODIUM SERPL-SCNC: 140 MMOL/L (ref 136–145)
WBC # BLD AUTO: 8.9 K/UL (ref 4.4–11.3)

## 2021-03-20 PROCEDURE — 74011000250 HC RX REV CODE- 250: Performed by: NURSE PRACTITIONER

## 2021-03-20 PROCEDURE — 74011250637 HC RX REV CODE- 250/637: Performed by: NURSE PRACTITIONER

## 2021-03-20 PROCEDURE — 86900 BLOOD TYPING SEROLOGIC ABO: CPT

## 2021-03-20 PROCEDURE — 30233N1 TRANSFUSION OF NONAUTOLOGOUS RED BLOOD CELLS INTO PERIPHERAL VEIN, PERCUTANEOUS APPROACH: ICD-10-PCS | Performed by: INTERNAL MEDICINE

## 2021-03-20 PROCEDURE — 74011250637 HC RX REV CODE- 250/637: Performed by: HOSPITALIST

## 2021-03-20 PROCEDURE — 82962 GLUCOSE BLOOD TEST: CPT

## 2021-03-20 PROCEDURE — 86923 COMPATIBILITY TEST ELECTRIC: CPT

## 2021-03-20 PROCEDURE — P9016 RBC LEUKOCYTES REDUCED: HCPCS

## 2021-03-20 PROCEDURE — 36430 TRANSFUSION BLD/BLD COMPNT: CPT

## 2021-03-20 PROCEDURE — 74011250636 HC RX REV CODE- 250/636: Performed by: NURSE PRACTITIONER

## 2021-03-20 PROCEDURE — 74011250636 HC RX REV CODE- 250/636: Performed by: HOSPITALIST

## 2021-03-20 PROCEDURE — 74011636637 HC RX REV CODE- 636/637: Performed by: NURSE PRACTITIONER

## 2021-03-20 PROCEDURE — 74011000258 HC RX REV CODE- 258: Performed by: NURSE PRACTITIONER

## 2021-03-20 PROCEDURE — 85025 COMPLETE CBC W/AUTO DIFF WBC: CPT

## 2021-03-20 PROCEDURE — 80048 BASIC METABOLIC PNL TOTAL CA: CPT

## 2021-03-20 PROCEDURE — 85018 HEMOGLOBIN: CPT

## 2021-03-20 PROCEDURE — 65270000029 HC RM PRIVATE

## 2021-03-20 PROCEDURE — 36415 COLL VENOUS BLD VENIPUNCTURE: CPT

## 2021-03-20 PROCEDURE — 94640 AIRWAY INHALATION TREATMENT: CPT

## 2021-03-20 RX ORDER — LANOLIN ALCOHOL/MO/W.PET/CERES
1 CREAM (GRAM) TOPICAL 2 TIMES DAILY WITH MEALS
Status: DISCONTINUED | OUTPATIENT
Start: 2021-03-20 | End: 2021-03-22 | Stop reason: HOSPADM

## 2021-03-20 RX ORDER — FUROSEMIDE 10 MG/ML
20 INJECTION INTRAMUSCULAR; INTRAVENOUS ONCE
Status: COMPLETED | OUTPATIENT
Start: 2021-03-20 | End: 2021-03-20

## 2021-03-20 RX ORDER — GABAPENTIN 400 MG/1
400 CAPSULE ORAL 3 TIMES DAILY
Status: DISCONTINUED | OUTPATIENT
Start: 2021-03-20 | End: 2021-03-22 | Stop reason: HOSPADM

## 2021-03-20 RX ORDER — SODIUM CHLORIDE 9 MG/ML
250 INJECTION, SOLUTION INTRAVENOUS AS NEEDED
Status: DISCONTINUED | OUTPATIENT
Start: 2021-03-20 | End: 2021-03-22 | Stop reason: HOSPADM

## 2021-03-20 RX ADMIN — ACETAMINOPHEN 650 MG: 325 TABLET ORAL at 00:32

## 2021-03-20 RX ADMIN — INSULIN LISPRO 4 UNITS: 100 INJECTION, SOLUTION INTRAVENOUS; SUBCUTANEOUS at 16:10

## 2021-03-20 RX ADMIN — METHYLPREDNISOLONE SODIUM SUCCINATE 60 MG: 125 INJECTION, POWDER, FOR SOLUTION INTRAMUSCULAR; INTRAVENOUS at 12:54

## 2021-03-20 RX ADMIN — GABAPENTIN 400 MG: 400 CAPSULE ORAL at 21:27

## 2021-03-20 RX ADMIN — IPRATROPIUM BROMIDE AND ALBUTEROL SULFATE 3 ML: .5; 3 SOLUTION RESPIRATORY (INHALATION) at 08:21

## 2021-03-20 RX ADMIN — PANTOPRAZOLE SODIUM 40 MG: 40 TABLET, DELAYED RELEASE ORAL at 07:31

## 2021-03-20 RX ADMIN — INSULIN GLARGINE 60 UNITS: 100 INJECTION, SOLUTION SUBCUTANEOUS at 21:26

## 2021-03-20 RX ADMIN — IPRATROPIUM BROMIDE AND ALBUTEROL SULFATE 3 ML: .5; 3 SOLUTION RESPIRATORY (INHALATION) at 15:29

## 2021-03-20 RX ADMIN — FERROUS SULFATE TAB 325 MG (65 MG ELEMENTAL FE) 325 MG: 325 (65 FE) TAB at 16:01

## 2021-03-20 RX ADMIN — IPRATROPIUM BROMIDE AND ALBUTEROL SULFATE 3 ML: .5; 3 SOLUTION RESPIRATORY (INHALATION) at 11:42

## 2021-03-20 RX ADMIN — IPRATROPIUM BROMIDE AND ALBUTEROL SULFATE 3 ML: .5; 3 SOLUTION RESPIRATORY (INHALATION) at 19:07

## 2021-03-20 RX ADMIN — ATORVASTATIN CALCIUM 10 MG: 10 TABLET, FILM COATED ORAL at 21:27

## 2021-03-20 RX ADMIN — METHYLPREDNISOLONE SODIUM SUCCINATE 60 MG: 125 INJECTION, POWDER, FOR SOLUTION INTRAMUSCULAR; INTRAVENOUS at 20:17

## 2021-03-20 RX ADMIN — FERROUS SULFATE TAB 325 MG (65 MG ELEMENTAL FE) 325 MG: 325 (65 FE) TAB at 08:23

## 2021-03-20 RX ADMIN — AMLODIPINE BESYLATE 5 MG: 5 TABLET ORAL at 08:23

## 2021-03-20 RX ADMIN — FUROSEMIDE 20 MG: 10 INJECTION, SOLUTION INTRAMUSCULAR; INTRAVENOUS at 14:41

## 2021-03-20 RX ADMIN — Medication 16 G: at 07:31

## 2021-03-20 RX ADMIN — Medication 5 MG: at 21:27

## 2021-03-20 RX ADMIN — PIPERACILLIN AND TAZOBACTAM 3.38 G: 3; .375 INJECTION, POWDER, LYOPHILIZED, FOR SOLUTION INTRAVENOUS at 00:33

## 2021-03-20 RX ADMIN — GABAPENTIN 400 MG: 400 CAPSULE ORAL at 08:23

## 2021-03-20 RX ADMIN — GABAPENTIN 400 MG: 400 CAPSULE ORAL at 16:01

## 2021-03-20 RX ADMIN — INSULIN LISPRO 10 UNITS: 100 INJECTION, SOLUTION INTRAVENOUS; SUBCUTANEOUS at 21:26

## 2021-03-20 RX ADMIN — INSULIN LISPRO 10 UNITS: 100 INJECTION, SOLUTION INTRAVENOUS; SUBCUTANEOUS at 11:31

## 2021-03-20 NOTE — ROUTINE PROCESS
56: Dr. Greyson Esparza is called concerning hgb. Type and screen are ordered and to transfuse one unit of packed red blood cells. 0600: Lab is on unit to draw blood. 6510: Consent for blood transfusion is signed by patient. Patient is hesitant but after education on risks is willing to receive blood. Patient is agitated at this time but cooperative. Consent is placed on physical chart.

## 2021-03-20 NOTE — PROGRESS NOTES
Progress Note  Date:3/20/2021       Room:[facility]/01  Patient Name:Dennis Jessica     YOB: 1945     Age:75 y.o. Subjective    HPI:  Patient is a 70year old male with history of Diabetes, hypertension, COPD and CKD stage III who presents to the emergency department today with complaints of shortness of breath. Of note he was diagnosed with Covid-19 on 02/09/202. He was admitted here at [facility] and treated for Covid-19. After discharge from the hospital, his PCP started him on Eliquis for PE prophylaxis secondary to Covid-19. His stool was positive for occult blood in ER. He has had two other emergency room encounters since discharge for shortness of breath. His hemoglobin noted to have slowly trended down since 02/09/2021. His Hemoglobin today is 7.9 from 11.3 on 02/09/21. Chest xray in ER today shows bilateral opacities not changed from prior. Although he is short of breath his oxygen saturation is 96% on room air. He will be admitted for further management. On follow-up patient seen and evaluated, sitting on side of bed no acute distress. Does get sob at times. Afebrile. Hg low again this AM but on repeat at 7.1 but discussed with patient and he is ok with transfusion of one unit of PRBC. EGD done on 3/19 shows antral gastritis but no active bleeding. Review of Systems   Constitutional: Negative for chills, diaphoresis, fatigue and fever. HENT: Negative for congestion, ear pain, postnasal drip, sinus pain and sore throat. Eyes: Negative for pain, discharge and redness. Respiratory: Negative for cough, shortness of breath and wheezing. Cardiovascular: Negative for chest pain and palpitations. Gastrointestinal: Negative for abdominal pain, constipation, diarrhea, nausea and vomiting. Genitourinary: Negative for dysuria, flank pain, frequency and urgency. Musculoskeletal: Negative for arthralgias and myalgias. Neurological: Negative for dizziness, weakness and headaches. Psychiatric/Behavioral: Negative for agitation and hallucinations. The patient is not nervous/anxious. Objective           Vitals Last 24 Hours:  Patient Vitals for the past 24 hrs:   Temp Pulse Resp BP SpO2   03/20/21 1115 98.1 °F (36.7 °C) 87 18 130/65 97 %   03/20/21 1107 98.1 °F (36.7 °C) 83 18 125/66 98 %   03/20/21 0824     93 %   03/20/21 0716 97.5 °F (36.4 °C) 92 20 (!) 143/73 92 %   03/20/21 0400 98.5 °F (36.9 °C) 89 20 137/78 95 %   03/20/21 0000 98 °F (36.7 °C) 98 20 128/79 96 %   03/19/21 2043     95 %   03/19/21 1920 97.7 °F (36.5 °C) 97 18 129/79 97 %   03/19/21 1541     98 %   03/19/21 1444  93 12 121/70 94 %   03/19/21 1429  90 22 124/77 99 %   03/19/21 1205     97 %   03/19/21 1152 98.3 °F (36.8 °C) (!) 109 23 137/78 94 %        I/O (24Hr): Intake/Output Summary (Last 24 hours) at 3/20/2021 1129  Last data filed at 3/20/2021 1124  Gross per 24 hour   Intake 2200 ml   Output 1976 ml   Net 224 ml       Physical Exam:  General: Alert, cooperative, no distress, appears stated age. Head:  Normocephalic, without obvious abnormality, atraumatic. Eyes:  Conjunctivae/corneas clear. Pupils equal, round, reactive to light. Extraocular movements intact. Lungs: Bilateral air entry left basilar wheeze noted  Chest wall: No tenderness or deformity. Heart:  Regular rate and rhythm, S1, S2 normal, no murmur, click, rub or gallop. Abdomen:  Soft, non-tender. Bowel sounds normal. No masses,  No organomegaly. Extremities: Extremities normal, atraumatic, no cyanosis or edema. Pulses: 2+ and symmetric all extremities. Skin: Skin color, texture, turgor normal. No rashes or lesions  Neurologic: Awake, Alert, oriented.  No obvious gross sensory or motor deficits      Medications           Current Facility-Administered Medications   Medication Dose Route Frequency    0.9% sodium chloride infusion 250 mL  250 mL IntraVENous PRN    ferrous sulfate tablet 325 mg  1 Tab Oral BID WITH MEALS  gabapentin (NEURONTIN) capsule 400 mg  400 mg Oral TID    furosemide (LASIX) injection 20 mg  20 mg IntraVENous ONCE    methylPREDNISolone (PF) (Solu-MEDROL) injection 60 mg  60 mg IntraVENous Q12H    albuterol (PROVENTIL VENTOLIN) nebulizer solution 2.5 mg  2.5 mg Nebulization Q6H PRN    pantoprazole (PROTONIX) tablet 40 mg  40 mg Oral ACB    acetaminophen (TYLENOL) tablet 650 mg  650 mg Oral Q6H PRN    amLODIPine (NORVASC) tablet 5 mg  5 mg Oral DAILY    atorvastatin (LIPITOR) tablet 10 mg  10 mg Oral QHS    insulin glargine (LANTUS) injection 60 Units  60 Units SubCUTAneous QHS    glucose chewable tablet 16 g  4 Tab Oral PRN    dextrose (D50W) injection syrg 12.5-25 g  25-50 mL IntraVENous PRN    glucagon (GLUCAGEN) injection 1 mg  1 mg IntraMUSCular PRN    insulin lispro (HUMALOG) injection   SubCUTAneous AC&HS    ondansetron (ZOFRAN) injection 4 mg  4 mg IntraVENous Q8H PRN    melatonin (rapid dissolve) tablet 5 mg  5 mg Oral QHS PRN    albuterol-ipratropium (DUO-NEB) 2.5 MG-0.5 MG/3 ML  3 mL Nebulization QID RT         Allergies         Patient has no known allergies. Labs/Imaging/Diagnostics      Labs:  Recent Results (from the past 48 hour(s))   COVID-19 RAPID TEST    Collection Time: 03/18/21  2:30 PM   Result Value Ref Range    Specimen source Nasopharyngeal      COVID-19 rapid test Not Detected Not Detected     CBC WITH AUTOMATED DIFF    Collection Time: 03/18/21  3:03 PM   Result Value Ref Range    WBC 9.7 4.4 - 11.3 K/uL    RBC 2.53 (L) 4.50 - 5.90 M/uL    HGB 7.9 (L) 13.5 - 17.5 g/dL    HCT 24.4 (L) 41 - 53 %    MCV 96.4 80 - 100 FL    MCH 31.1 31 - 34 PG    MCHC 32.3 31.0 - 36.0 g/dL    RDW 18.0 (H) 11.5 - 14.5 %    PLATELET 894 K/uL    MPV 7.6 6.5 - 11.5 FL    NRBC 0.0  WBC    ABSOLUTE NRBC 0.00 K/uL    NEUTROPHILS 88 (H) 42 - 75 %    LYMPHOCYTES 7 (L) 20.5 - 51.1 %    MONOCYTES 4 1.7 - 9.3 %    EOSINOPHILS 0 (L) 0.9 - 2.9 %    BASOPHILS 1 0.0 - 2.5 %    ABS. NEUTROPHILS 8.6 (H) 1.8 - 7.7 K/UL    ABS. LYMPHOCYTES 0.6 (L) 1.0 - 4.8 K/UL    ABS. MONOCYTES 0.4 0.2 - 2.4 K/UL    ABS. EOSINOPHILS 0.0 0.0 - 0.7 K/UL    ABS. BASOPHILS 0.1 0.0 - 0.2 K/UL   PROTHROMBIN TIME + INR    Collection Time: 03/18/21  3:03 PM   Result Value Ref Range    Prothrombin time 9.6 9.0 - 11.1 sec    INR 1.0 0.9 - 1.1     TROPONIN I    Collection Time: 03/18/21  3:03 PM   Result Value Ref Range    Troponin-I, Qt. <0.05 <0.05 ng/mL   MAGNESIUM    Collection Time: 03/18/21  3:03 PM   Result Value Ref Range    Magnesium 2.1 1.6 - 2.4 mg/dL   METABOLIC PANEL, COMPREHENSIVE    Collection Time: 03/18/21  3:03 PM   Result Value Ref Range    Sodium 141 136 - 145 mmol/L    Potassium 4.8 3.5 - 5.1 mmol/L    Chloride 106 97 - 108 mmol/L    CO2 26 21 - 32 mmol/L    Anion gap 9 5 - 15 mmol/L    Glucose 198 (H) 65 - 100 mg/dL    BUN 48 (H) 6 - 20 mg/dL    Creatinine 2.66 (H) 0.70 - 1.30 mg/dL    BUN/Creatinine ratio 18 12 - 20      GFR est AA 29 (L) >60 ml/min/1.73m2    GFR est non-AA 24 (L) >60 ml/min/1.73m2    Calcium 8.8 8.5 - 10.1 mg/dL    Bilirubin, total 0.2 0.2 - 1.0 mg/dL    AST (SGOT) 23 15 - 37 U/L    ALT (SGPT) 38 12 - 78 U/L    Alk.  phosphatase 76 45 - 117 U/L    Protein, total 7.0 6.4 - 8.2 g/dL    Albumin 2.6 (L) 3.5 - 5.0 g/dL    Globulin 4.4 (H) 2.0 - 4.0 g/dL    A-G Ratio 0.6 (L) 1.1 - 2.2     BNP    Collection Time: 03/18/21  3:03 PM   Result Value Ref Range    NT pro- (H) <125 pg/mL   GLUCOSE, POC    Collection Time: 03/18/21  9:53 PM   Result Value Ref Range    Glucose (POC) 389 (H) 65 - 100 mg/dL    Performed by KEITH SAHA    HGB & HCT    Collection Time: 03/18/21 11:00 PM   Result Value Ref Range    HGB 7.5 (L) 13.5 - 17.5 g/dL    HCT 23.4 (L) 41 - 53 %   PROCALCITONIN    Collection Time: 03/18/21 11:00 PM   Result Value Ref Range    Procalcitonin <0.05 ng/mL   CBC WITH AUTOMATED DIFF    Collection Time: 03/19/21  5:29 AM   Result Value Ref Range    WBC 7.4 4.4 - 11.3 K/uL    RBC 2.34 (L) 4.50 - 5.90 M/uL    HGB 7.2 (L) 13.5 - 17.5 g/dL    HCT 22.4 (L) 41 - 53 %    MCV 96.0 80 - 100 FL    MCH 31.0 31 - 34 PG    MCHC 32.3 31.0 - 36.0 g/dL    RDW 17.7 (H) 11.5 - 14.5 %    PLATELET 104 K/uL    MPV 7.3 6.5 - 11.5 FL    NRBC 0.1  WBC    ABSOLUTE NRBC 0.01 K/uL    NEUTROPHILS 84 (H) 42 - 75 %    LYMPHOCYTES 10 (L) 20.5 - 51.1 %    MONOCYTES 5 1.7 - 9.3 %    EOSINOPHILS 0 (L) 0.9 - 2.9 %    BASOPHILS 1 0.0 - 2.5 %    ABS. NEUTROPHILS 6.3 1.8 - 7.7 K/UL    ABS. LYMPHOCYTES 0.7 (L) 1.0 - 4.8 K/UL    ABS. MONOCYTES 0.3 0.2 - 2.4 K/UL    ABS. EOSINOPHILS 0.0 0.0 - 0.7 K/UL    ABS.  BASOPHILS 0.1 0.0 - 0.2 K/UL   METABOLIC PANEL, BASIC    Collection Time: 03/19/21  5:29 AM   Result Value Ref Range    Sodium 139 136 - 145 mmol/L    Potassium 4.8 3.5 - 5.1 mmol/L    Chloride 105 97 - 108 mmol/L    CO2 26 21 - 32 mmol/L    Anion gap 8 5 - 15 mmol/L    Glucose 163 (H) 65 - 100 mg/dL    BUN 53 (H) 6 - 20 mg/dL    Creatinine 2.50 (H) 0.70 - 1.30 mg/dL    BUN/Creatinine ratio 21 (H) 12 - 20      GFR est AA 31 (L) >60 ml/min/1.73m2    GFR est non-AA 25 (L) >60 ml/min/1.73m2    Calcium 8.5 8.5 - 10.1 mg/dL   IRON PROFILE    Collection Time: 03/19/21  5:29 AM   Result Value Ref Range    Iron 26 (L) 35 - 150 ug/dL    TIBC 315 250 - 450 ug/dL    Iron % saturation 8 (L) 20 - 50 %   GLUCOSE, POC    Collection Time: 03/19/21  7:55 AM   Result Value Ref Range    Glucose (POC) 143 (H) 65 - 100 mg/dL    Performed by Chicho Kate    ECHO ADULT COMPLETE    Collection Time: 03/19/21 10:31 AM   Result Value Ref Range    LV ED Vol A2C 105.76 mL    LV ED Vol A2C 105.76 mL    IVSd 1.20 (A) 0.60 - 1.00 cm    LVIDd 4.77 4.20 - 5.90 cm    LVIDs 4.11 cm    LVPWd 1.17 (A) 0.60 - 1.00 cm    LVOT SV 39.0 mL    LVOT SV 39.0 mL    BP EF 63.8 55.0 - 100.0 percent    BP EF 63.8 55.0 - 100.0 percent    LV Ejection Fraction MOD 2C 72 percent    LV Ejection Fraction MOD 2C 72 percent    LV Ejection Fraction MOD 4C 50 percent    LV Ejection Fraction MOD 4C 50 percent    LV ED Vol BP 71.54 67.0 - 155.0 mL    LV ED Vol BP 71.54 67.0 - 155.0 mL    LV ES Vol A2C 15.64 mL    LV ES Vol BP 25.87 22.0 - 58.0 mL    LV ES Vol BP 25.87 22.0 - 58.0 mL    LVOT Peak Gradient 2.72 mmHg    Left Ventricular Outflow Tract Mean Gradient 1.28 mmHg    LVOT Peak Velocity 82.50 cm/s    LVOT VTI 14.79 cm    RVIDd 3.54 cm    LA Volume 28.60 18.0 - 58.0 mL    LA Volume 28.60 18.0 - 58.0 mL    LA Vol 2C 23.23 18.00 - 58.00 mL    LA Vol 4C 26.86 18.00 - 58.00 mL    AoV PG 6.11 mmHg    Aortic Valve Systolic Mean Gradient 6.60 mmHg    Aortic Valve Systolic Peak Velocity 850.34 cm/s    Aortic valve mean velocity 82.08 cm/s    AoV VTI 16.39 cm    MV A Octavio 109.63 cm/s    Mitral Valve E Wave Deceleration Time 224.16 ms    MV E Octavio 77.35 cm/s    MV E/A 0.71     Mitral Valve Pressure Half-time 65.01 ms    MVA (PHT) 3.38 cm2    MVA (PHT) 3.38 cm2    Ao Root D 3.89 cm    LV Mass .1 88.0 - 224.0 g   GLUCOSE, POC    Collection Time: 03/19/21 11:42 AM   Result Value Ref Range    Glucose (POC) 137 (H) 65 - 100 mg/dL    Performed by [employer]    HGB & HCT    Collection Time: 03/19/21 11:58 AM   Result Value Ref Range    HGB 7.2 (L) 13.5 - 17.5 g/dL    HCT 22.1 (L) 41 - 53 %   GLUCOSE, POC    Collection Time: 03/19/21  3:50 PM   Result Value Ref Range    Glucose (POC) 133 (H) 65 - 100 mg/dL    Performed by [employer]    HGB & HCT    Collection Time: 03/19/21  6:55 PM   Result Value Ref Range    HGB 7.5 (L) 13.5 - 17.5 g/dL    HCT 24.0 (L) 41 - 53 %   GLUCOSE, POC    Collection Time: 03/19/21  8:31 PM   Result Value Ref Range    Glucose (POC) 408 (H) 65 - 100 mg/dL    Performed by Eduardo LAKHANI    CBC WITH AUTOMATED DIFF    Collection Time: 03/20/21  5:22 AM   Result Value Ref Range    WBC 8.9 4.4 - 11.3 K/uL    RBC 2.17 (L) 4.50 - 5.90 M/uL    HGB 6.8 (L) 13.5 - 17.5 g/dL    HCT 20.7 (L) 41 - 53 %    MCV 95.4 80 - 100 FL    MCH 31.3 31 - 34 PG    MCHC 32.8 31.0 - 36.0 g/dL RDW 17.8 (H) 11.5 - 14.5 %    PLATELET 528 K/uL    MPV 7.5 6.5 - 11.5 FL    NRBC 0.2  WBC    ABSOLUTE NRBC 0.02 K/uL    NEUTROPHILS 71 42 - 75 %    LYMPHOCYTES 16 (L) 20.5 - 51.1 %    MONOCYTES 12 (H) 1.7 - 9.3 %    EOSINOPHILS 0 (L) 0.9 - 2.9 %    BASOPHILS 1 0.0 - 2.5 %    ABS. NEUTROPHILS 6.4 1.8 - 7.7 K/UL    ABS. LYMPHOCYTES 1.4 1.0 - 4.8 K/UL    ABS. MONOCYTES 1.1 0.2 - 2.4 K/UL    ABS. EOSINOPHILS 0.0 0.0 - 0.7 K/UL    ABS.  BASOPHILS 0.0 0.0 - 0.2 K/UL   METABOLIC PANEL, BASIC    Collection Time: 03/20/21  5:22 AM   Result Value Ref Range    Sodium 140 136 - 145 mmol/L    Potassium 4.5 3.5 - 5.1 mmol/L    Chloride 104 97 - 108 mmol/L    CO2 26 21 - 32 mmol/L    Anion gap 10 5 - 15 mmol/L    Glucose 132 (H) 65 - 100 mg/dL    BUN 65 (H) 6 - 20 mg/dL    Creatinine 2.67 (H) 0.70 - 1.30 mg/dL    BUN/Creatinine ratio 24 (H) 12 - 20      GFR est AA 29 (L) >60 ml/min/1.73m2    GFR est non-AA 24 (L) >60 ml/min/1.73m2    Calcium 8.4 (L) 8.5 - 10.1 mg/dL   TYPE & SCREEN    Collection Time: 03/20/21  6:05 AM   Result Value Ref Range    Crossmatch Expiration 03/23/2021,2359     ABO/Rh(D) O Positive     Antibody screen Negative     Unit number [id number]     Blood component type RC LR     Unit division 00     Status of unit Issued     [address] to transfuse     Crossmatch result Compatible    GLUCOSE, POC    Collection Time: 03/20/21  7:27 AM   Result Value Ref Range    Glucose (POC) 66 65 - 100 mg/dL    Performed by [address], POC    Collection Time: 03/20/21  7:52 AM   Result Value Ref Range    Glucose (POC) 80 65 - 100 mg/dL    Performed by [facility]    HGB & HCT    Collection Time: 03/20/21  9:35 AM   Result Value Ref Range    HGB 7.1 (L) 13.5 - 17.5 g/dL    HCT 22.5 (L) 41 - 53 %   GLUCOSE, POC    Collection Time: 03/20/21 11:27 AM   Result Value Ref Range    Glucose (POC) 339 (H) 65 - 100 mg/dL    Performed by [facility]es[facility]         Imaging:  Xr Chest Port    Result Date: 3/18/2021  Overall similar appearance of bilateral opacities. Assessment//Plan           Problem List:  Hospital Problems  Never Reviewed          Codes Class Noted POA    * (Principal) GI bleed ICD-10-CM: K92.2  ICD-9-CM: 578.9  3/18/2021 Unknown        COPD (chronic obstructive pulmonary disease) ([facility] [address] ICD-10-CM: J44.9  ICD-9-CM: 496  2/13/2021 Yes        CKD (chronic kidney disease) ICD-10-CM: N18.9  ICD-9-CM: 585.9  2/13/2021 Yes              Acute Blood Loss Anemia  - Likely secondary to anticoagulant Eliquis  - Stool is positive for occult blood  - Hemoglobin is 7.9 today from 11.3 on 02/09/21 but on repeat checks continues to be on downward trend 7.5 -> 7.2 ->7.2 ->7.5 ->6.8 - >7.1  -With hemoglobin continues on downward trend slightly we will go ahead and transfuse 1 unit of packed red blood cells  -Iron level was noted to be low at 26 so ferrous sulfate 305 mg oral twice daily with meals started  -We will repeat CBC in a.m. GI Bleed   - Likely secondary to Eliquis therapy started by PCP secondary to recent Covid infection and patient stopped taking medication about 2 to 3 days back  - has hx of gastritis / gastric ulcers on EGD done several years back but states is has been stable.    - Stop Eliquis and will trend hemoglobin level Q 6 hours  - Start Protonix 40 mg IV BID changed to 40 mg oral daily on 3/20  -Status post EGD by Dr. Josesito Ram which resulted in antral gastritis and angiodysplasia in the gastric body but no active bleeding noted  -Continue monitor H&H closely      Bilateral Lobe Pneumonia  - Likely residual for recent covid-19 infection  - Rapid Covid-19 result is negative   - Initially started on broad spectrum antiiotic Zosyn but procalcitonin was in normal range so we will discontinue Zosyn     COPD exacerbation  He quit smoking 3 months ago  Has some mild scattered wheezes bilaterlly on admission  Continue Duonebs and PO prednisone initially but changed to IV Solu-Medrol 60 mg every 12 hours on 3/20  -Order O2 saturation and keep O2 greater than 92%     Recent history of Covid-19 infection  - Rapid covid-19 test negative today and chest x-ray still shows similar bilateral airspace opacities     Hypertension  - Restart Amlodipine and losartan since creatinine is at baseline for his chronic kidney disease stage III  -Patient on 3/20 receiving 1 unit of blood transfusion so we will give Lasix 20 mg IV x1  - vitals q4hrs      Diabetes Type 2  - A1c was 9.6% on 02/13/21  - Start SSI and lantus at bedtime of 60 units  - Accu check ACHS  - nutrition evaluation     Diabetic neuropathy  -Patient is on 300 mg oral 3 times daily and has been on that dose and patient continues to complain of neuropathic pain so on 3/20 increased dosing to 400 mg oral 3 times daily   - monitor closely and hold for sedation    CKD stage III  - Creatinine is 2.66, no significant change from prior.  - Avoid nephrotoxic medications     DVT prophylaxis  Contraindicated secondary to blood loss anemia    Full Code   Patient's daughter is designated as his main decision-maker    Attempted to call patient's daughter Yasemin Guzman at [phone] but unable to reach at this time    Spent 30 minutes evaluting and coordinating patient care of which >50% was spent coordinating and counseling.        Electronically signed by Quinn Leach MD on 3/20/2021 at 9:51 AM

## 2021-03-20 NOTE — CONSULTS
Gastroenterology Consult      Patient: Amelia Horta MRN: [id number]  SSN: [id number]    YOB: 1945  Age: 70 y.o. Sex: male        Assessment:     1.  GI bleeding. 2.  Acute blood loss anemia  3. Bilateral pneumonia  4. Recent COVID-19 infection   5. Chronic kidney disease stage III  6. Diabetes mellitus type II    Plan:     1. Closely monitor H&H and stools of blood. 2.  Transfuse if needed to maintain hematocrit of 25-30.   3.  Daily PPI therapy. 4.  Agree with stopping the Eliquis. 5.  Patient has been scheduled for an upper endoscopy today to further evaluate for cause of the bleeding. If none found will consider obtaining a colonoscopy to look at the lower digestive tract. This can be done as outpatient if his H&H remained stable. 6.  Advised to avoid use of NSAIDs. Subjective:     Reason for consultation: GI Bleeding    History: 77-year-old male with history of hypertension, diabetes mellitus type 2, COPD, chronic kidney disease stage III, and recent COVID-19 infection admitted with heme-positive stools and a drop in H&H. Was diagnosed with COVID-19 infection on 2/9/2021 was admitted for treatment. After discharge from the hospital the patient was started on Eliquis for PE prophylaxis. Had been noted that his blood count was gradually decreasing down to hemoglobin of 7.9 from 11.3. No nausea or vomiting. No abdominal pain. He states he was started on the blood thinner proximately 1 week ago and it was stopped approximately 3 days ago. His last colonoscopy was 2 to 3 years ago and [facility]ograms [place] and he is unsure of the results but believes it was unremarkable. He has a history of \"stomach problems\" 20 or 30 years ago.     Hospital Problems  Never Reviewed          Codes Class Noted POA    GI bleed ICD-10-CM: K92.2  ICD-9-CM: 578.9  3/18/2021 Unknown        COPD (chronic obstructive pulmonary disease) ([facility] ICD-10-CM: J44.9  ICD-9-CM: 779  2/13/2021 Yes CKD (chronic kidney disease) ICD-10-CM: N18.9  ICD-9-CM: 585.9  2/13/2021 Yes            Past Medical History:   Diagnosis Date    Asthma     B12 deficiency     CAD (coronary artery disease)     Cataract     Chronic obstructive pulmonary disease (HCC)     CKD (chronic kidney disease)     COVID-19     Depression     Diabetes (HCC)     DJD (degenerative joint disease)     Heart attack ([facility]     Hypertension     Long term prescription opiate use     Neuropathy     Rheumatoid arthritis ([facility]     Ulcer      Past Surgical History:   Procedure Laterality Date    HX ANGIOPLASTY      HX HEENT      HX ORTHOPAEDIC      [place] CARDIAC SURG PROCEDURE UNLIST      RCA stent      Family History   Problem Relation Age of Onset    Diabetes Mother     Asthma Mother     Diabetes Father      Social History     Tobacco Use    Smoking status: Former Smoker    Smokeless tobacco: Former User   Substance Use Topics    Alcohol use: No      Current Facility-Administered Medications   Medication Dose Route Frequency Provider Last Rate Last Admin    albuterol (PROVENTIL VENTOLIN) nebulizer solution 2.5 mg  2.5 mg Nebulization Q6H PRN Claudia Weinstein MD        [START ON 3/20/2021] pantoprazole (PROTONIX) tablet 40 mg  40 mg Oral ACB Thiago Weinstein MD        acetaminophen (TYLENOL) tablet 650 mg  650 mg Oral Q6H PRN David Borden NP        amLODIPine (NORVASC) tablet 5 mg  5 mg Oral DAILY David Borden NP   5 mg at 03/19/21 8179    atorvastatin (LIPITOR) tablet 10 mg  10 mg Oral QHS MaurisiobotDavid, NP   10 mg at 03/19/21 2107    gabapentin (NEURONTIN) capsule 300 mg  300 mg Oral TID David Borden NP   300 mg at 03/19/21 2107    insulin glargine (LANTUS) injection 60 Units  60 Units SubCUTAneous QHS David Borden NP   60 Units at 03/19/21 2107    glucose chewable tablet 16 g  4 Tab Oral PRN David Borden NP        dextrose (D50W) injection syrg 12.5-25 g  25-50 mL IntraVENous PRN Lola Lillian, NP  glucagon (GLUCAGEN) injection 1 mg  1 mg IntraMUSCular PRN Cj Newman NP        insulin lispro (HUMALOG) injection   SubCUTAneous AC&HS Cj Newman NP   10 Units at 03/19/21 2108    ondansetron (ZOFRAN) injection 4 mg  4 mg IntraVENous Q8H PRN David Borden NP        piperacillin-tazobactam (ZOSYN) 3.375 g in 0.9% sodium chloride (MBP/ADV) 100 mL MBP  3.375 g IntraVENous Q8H LauritDavid, NP 25 mL/hr at 03/19/21 1540 3.375 g at 03/19/21 1540    predniSONE (DELTASONE) tablet 20 mg  20 mg Oral DAILY WITH BREAKFAST David Borden NP   20 mg at 03/19/21 2676    melatonin (rapid dissolve) tablet 5 mg  5 mg Oral QHS PRN David Borden NP   5 mg at 03/19/21 2107    albuterol-ipratropium (DUO-NEB) 2.5 MG-0.5 MG/3 ML  3 mL Nebulization QID RT David Borden NP   3 mL at 03/19/21 2043        No Known Allergies    Review of Systems:  Constitutional: No recent weight change, fever,fatigue, or loss of appetite. ENT/Mouth: No hearing loss, nose bleeds, sore throat, voice change, hoarseness, or difficulties with chewing and swallowing. Cardiovascular: No chest pain, palpitations, swelling of feet/ankles/hands. Respiratory: No chronic or frequent coughs, spitting up blood, (+)shortness of breath, asthma, or wheezing. Gastrointestinal: No abdominal pain, heartburn, nausea, vomiting, constipation, frequent diarrhea, rectal bleeding, (+) blood in stool. Genitourinary: No frequent urination, burning or painful urination, blood in urine. Musculoskeletal:  No joint pain, stiffness/swelling, weakness of muscles, muscle pain/cramp, or back pain. Integument:  No rash/itching, change in skin color. Neurological: No dizziness/vertigo, loss of consciousness, numbness/tingling sensation, tremors, weakness in limbs, difficulty with balance, frequent or recurring headaches, memory loss or confusion. Psychiatric:  No nervousness, depression, hallucinations, paranoia or suspiciousness.   Endocrine: No excessive thirst or urination, heat or cold intolerance. Hematologic/Lymphatic: No bleeding/bruising tendency, phlebitis, or past transfusion. Objective:     Vitals:    03/19/21 1444 03/19/21 1541 03/19/21 1920 03/19/21 2043   BP: 121/70  129/79    Pulse: 93  97    Resp: 12  18    Temp:   97.7 °F (36.5 °C)    SpO2: 94% 98% 97% 95%   Weight:            Physical Exam:  General: Alert, cooperative, no distress. Head:  Normocephalic, without obvious abnormality, atraumatic. Eyes:  Conjunctivae/corneas clear. Pupils equal, round, reactive to light. Extraocular movements intact. Lungs: Scattered rhonchi over both lung fields, decreased breath sounds in bases. Chest wall: No tenderness or deformity. Heart:  Regular rate and rhythm, S1, S2 normal, no murmur, click, rub, or gallop. Abdomen:  Soft, tenderness in epigastric region, no rebound. Bowel sounds normal. No masses. No organomegaly. Extremities: Extremities normal, atraumatic, no cyanosis or edema. Pulses: 2+ and symmetric all extremities. Skin: Skin color, texture, turgor normal. No rashes or lesions. Lymph nodes: Cervical, supraclavicular, and axillary nodes normal.  Neurologic: CNII-XII intact. Normal strength, sensation, and reflexes throughout. CBC w/Diff Recent Labs     03/19/21  1855 03/19/21  1158 03/19/21  0529 03/18/21  1503 03/18/21  1503   WBC  --   --  7.4  --  9.7   RBC  --   --  2.34*  --  2.53*   HGB 7.5* 7.2* 7.2*   < > 7.9*   HCT 24.0* 22.1* 22.4*   < > 24.4*   PLT  --   --  455  --  515   GRANS  --   --  84*  --  88*   LYMPH  --   --  10*  --  7*   EOS  --   --  0*  --  0*    < > = values in this interval not displayed.         Chemistry Recent Labs     03/19/21  0529 03/18/21  1503   * 198*    141   K 4.8 4.8    106   CO2 26 26   BUN 53* 48*   CREA 2.50* 2.66*   CA 8.5 8.8   MG  --  2.1   AGAP 8 9   BUCR 21* 18   AP  --  76   TP  --  7.0   ALB  --  2.6*   GLOB  --  4.4*   AGRAT  --  0.6*        Lactic Acid No results found for: LAC  No results for input(s): LAC in the last 72 hours. Micro  No results for input(s): SDES, CULT in the last 72 hours. No results for input(s): CULT in the last 72 hours. Liver Enzymes Protein, total   Date Value Ref Range Status   03/18/2021 7.0 6.4 - 8.2 g/dL Final     Albumin   Date Value Ref Range Status   03/18/2021 2.6 (L) 3.5 - 5.0 g/dL Final     Globulin   Date Value Ref Range Status   03/18/2021 4.4 (H) 2.0 - 4.0 g/dL Final     A-G Ratio   Date Value Ref Range Status   03/18/2021 0.6 (L) 1.1 - 2.2   Final     Alk. phosphatase   Date Value Ref Range Status   03/18/2021 76 45 - 117 U/L Final     Recent Labs     03/18/21  1503   TP 7.0   ALB 2.6*   GLOB 4.4*   AGRAT 0.6*   AP 76          Cardiac Enzymes No results found for: CPK, CK, CKMMB, CKMB, RCK3, CKMBT, CKNDX, CKND1, JOAQUIN, TROPT, TROIQ, GABI, TROPT, TNIPOC, BNP, BNPP     BNP No results found for: BNP, BNPP, XBNPT     Coagulation Recent Labs     03/18/21  1503   PTP 9.6   INR 1.0         Thyroid  No results found for: T4, T3U, TSH, TSHEXT       Lipid Panel No results found for: CHOL, CHOLPOCT, CHOLX, CHLST, CHOLV, 954012, HDL, HDLP, LDL, LDLC, DLDLP, 072342, VLDLC, VLDL, TGLX, TRIGL, TRIGP, TGLPOCT, CHHD, CHHDX     Urinalysis Lab Results   Component Value Date/Time    Color Yellow/Straw 02/09/2021 02:45 PM    Appearance Clear 02/09/2021 02:45 PM    Specific gravity 1.025 02/09/2021 02:45 PM    pH (UA) 5.5 02/09/2021 02:45 PM    Protein >300 (A) 02/09/2021 02:45 PM    Glucose >1,000 (A) 02/09/2021 02:45 PM    Ketone Negative 02/09/2021 02:45 PM    Bilirubin Negative 02/09/2021 02:45 PM    Urobilinogen 0.2 02/09/2021 02:45 PM    Nitrites Negative 02/09/2021 02:45 PM    Leukocyte Esterase Negative 02/09/2021 02:45 PM    Bacteria Negative 02/09/2021 02:45 PM    WBC 5-10 02/09/2021 02:45 PM    RBC 5-10 02/09/2021 02:45 PM        XR (Most Recent).  CXR reviewed by me and compared with previous CXR Results from [facility] [place] Encounter encounter on 03/18/21   XR CHEST PORT    Narrative Examination: XR CHEST PORT     History: sob    Comparison: Chest radiograph 3/11/2021, CT chest 3/11/2021    FINDINGS:    Single frontal portable view of the chest. Overall similar appearance of patchy  bilateral groundglass and airspace opacities, right greater than left. No  pneumothorax or significant pleural effusion. The cardiac silhouette is mildly  prominent, unchanged. Atherosclerosis of the thoracic aorta. Multiple remote  healed left rib fractures. Cardiac monitoring leads overlie the chest.      Impression Overall similar appearance of bilateral opacities. CT (Most Recent) Results from Hospital Encounter encounter on 03/11/21   CT CHEST WO CONT    Narrative Exam: CT CHEST WO CONT    TECHNIQUE: Multiple transaxial CT images of the chest were obtained without  contrast. Coronal and sagittal reformatted images were provided. MIP images were  also performed. Dose reduction: All CT scans at this facility are performed using dose reduction  optimization techniques as appropriate to a performed exam including the  following: Automated exposure control, adjustments of the mA and/or kV according  to patient size, or use of iterative reconstruction technique. COMPARISON: Chest radiograph performed the same day, chest radiograph 2/9/2021,  2/13/2021    HISTORY: COVID-19 positive worsening of shortness of breath    FINDINGS:    Extensive coronary artery atherosclerotic calcification. Atherosclerosis of the  thoracic aorta. Heart size is normal without significant pericardial effusion. The visualized portions of the thyroid appear unremarkable. Shotty subcentimeter  mediastinal lymph nodes. The intrathoracic esophagus is decompressed, limiting  further evaluation. Central airways appear patent. There are patchy areas of groundglass  opacification that is peripheral and apical predominant. There is bronchiectasis  as well in the upper lobes.  Some of the opacities have central cystic regions in  the right lower lobe (axial series 3, image 60) for example No pneumothorax or  significant pleural effusion. Visualized upper abdominal contents appear unremarkable. Favored remote healed rib fractures bilaterally. Multilevel degenerative change  within the spine. No acute or aggressive osseous abnormalities are evident. No  suspicious superficial soft tissue abnormalities. Impression 1. Apical and peripheral predominant groundglass opacities, nonspecific, but  could be compatible with provided clinical history of viral pneumonia. Other  infectious or inflammatory processes could appear similar by imaging. 2. Apical predominant bronchiectasis. 3. Atherosclerotic vascular disease. 4. See above for additional comments and chronic findings.               Diana Sultana MD  3/19/2021  11:01 PM.

## 2021-03-20 NOTE — PROGRESS NOTES
Problem: Falls - Risk of  Goal: *Absence of Falls  Description: Document Cedrick Her Fall Risk and appropriate interventions in the flowsheet. Outcome: Progressing Towards Goal  Note: Fall Risk Interventions:            Medication Interventions: Patient to call before getting OOB, Teach patient to arise slowly         History of Falls Interventions: Room close to nurse's station, Evaluate medications/consider consulting pharmacy, Door open when patient unattended         Problem: Patient Education: Go to Patient Education Activity  Goal: Patient/Family Education  Outcome: Progressing Towards Goal     Problem: Fluid Volume - Risk of, Imbalanced  Goal: *Balanced intake and output  Outcome: Progressing Towards Goal     Problem: Pressure Injury - Risk of  Goal: *Prevention of pressure injury  Description: Document Austen Scale and appropriate interventions in the flowsheet. Outcome: Progressing Towards Goal  Note: Pressure Injury Interventions:  Sensory Interventions: Assess changes in LOC, Keep linens dry and wrinkle-free, Minimize linen layers, Turn and reposition approx. every two hours (pillows and wedges if needed)         Activity Interventions: PT/OT evaluation    Mobility Interventions: PT/OT evaluation, Turn and reposition approx.  every two hours(pillow and wedges)    Nutrition Interventions: Document food/fluid/supplement intake, Offer support with meals,snacks and hydration                     Problem: Patient Education: Go to Patient Education Activity  Goal: Patient/Family Education  Outcome: Progressing Towards Goal

## 2021-03-21 ENCOUNTER — APPOINTMENT (OUTPATIENT)
Dept: GENERAL RADIOLOGY | Age: 76
DRG: 813 | End: 2021-03-21
Attending: HOSPITALIST
Payer: MEDICARE

## 2021-03-21 LAB
ANION GAP SERPL CALC-SCNC: 11 MMOL/L (ref 5–15)
BASOPHILS # BLD: 0 K/UL (ref 0–0.2)
BASOPHILS NFR BLD: 0 % (ref 0–2.5)
BUN SERPL-MCNC: 60 MG/DL (ref 6–20)
BUN/CREAT SERPL: 25 (ref 12–20)
CA-I BLD-MCNC: 8.6 MG/DL (ref 8.5–10.1)
CHLORIDE SERPL-SCNC: 104 MMOL/L (ref 97–108)
CO2 SERPL-SCNC: 23 MMOL/L (ref 21–32)
CREAT SERPL-MCNC: 2.42 MG/DL (ref 0.7–1.3)
EOSINOPHIL # BLD: 0 K/UL (ref 0–0.7)
EOSINOPHIL NFR BLD: 0 % (ref 0.9–2.9)
ERYTHROCYTE [DISTWIDTH] IN BLOOD BY AUTOMATED COUNT: 19 % (ref 11.5–14.5)
GLUCOSE BLD STRIP.AUTO-MCNC: 271 MG/DL (ref 65–100)
GLUCOSE BLD STRIP.AUTO-MCNC: 334 MG/DL (ref 65–100)
GLUCOSE BLD STRIP.AUTO-MCNC: 356 MG/DL (ref 65–100)
GLUCOSE BLD STRIP.AUTO-MCNC: 433 MG/DL (ref 65–100)
GLUCOSE BLD STRIP.AUTO-MCNC: 507 MG/DL (ref 65–100)
GLUCOSE BLD STRIP.AUTO-MCNC: 95 MG/DL (ref 65–100)
GLUCOSE SERPL-MCNC: 105 MG/DL (ref 65–100)
HCT VFR BLD AUTO: 27.2 % (ref 41–53)
HGB BLD-MCNC: 8.9 G/DL (ref 13.5–17.5)
LYMPHOCYTES # BLD: 0.8 K/UL (ref 1–4.8)
LYMPHOCYTES NFR BLD: 7 % (ref 20.5–51.1)
MCH RBC QN AUTO: 30 PG (ref 31–34)
MCHC RBC AUTO-ENTMCNC: 32.8 G/DL (ref 31–36)
MCV RBC AUTO: 91.6 FL (ref 80–100)
MONOCYTES # BLD: 0.6 K/UL (ref 0.2–2.4)
MONOCYTES NFR BLD: 6 % (ref 1.7–9.3)
NEUTS SEG # BLD: 8.9 K/UL (ref 1.8–7.7)
NEUTS SEG NFR BLD: 87 % (ref 42–75)
NRBC # BLD: 0.02 K/UL
NRBC BLD-RTO: 0.2 PER 100 WBC
PERFORMED BY, TECHID: ABNORMAL
PERFORMED BY, TECHID: NORMAL
PLATELET # BLD AUTO: 428 K/UL
PMV BLD AUTO: 7.6 FL (ref 6.5–11.5)
POTASSIUM SERPL-SCNC: 4.6 MMOL/L (ref 3.5–5.1)
RBC # BLD AUTO: 2.98 M/UL (ref 4.5–5.9)
SODIUM SERPL-SCNC: 138 MMOL/L (ref 136–145)
WBC # BLD AUTO: 10.4 K/UL (ref 4.4–11.3)

## 2021-03-21 PROCEDURE — 65270000029 HC RM PRIVATE

## 2021-03-21 PROCEDURE — 74011250637 HC RX REV CODE- 250/637: Performed by: HOSPITALIST

## 2021-03-21 PROCEDURE — 82962 GLUCOSE BLOOD TEST: CPT

## 2021-03-21 PROCEDURE — 74011250636 HC RX REV CODE- 250/636: Performed by: HOSPITALIST

## 2021-03-21 PROCEDURE — 71046 X-RAY EXAM CHEST 2 VIEWS: CPT

## 2021-03-21 PROCEDURE — 74011636637 HC RX REV CODE- 636/637: Performed by: NURSE PRACTITIONER

## 2021-03-21 PROCEDURE — 80048 BASIC METABOLIC PNL TOTAL CA: CPT

## 2021-03-21 PROCEDURE — 74011250637 HC RX REV CODE- 250/637: Performed by: NURSE PRACTITIONER

## 2021-03-21 PROCEDURE — 74011250636 HC RX REV CODE- 250/636: Performed by: EMERGENCY MEDICINE

## 2021-03-21 PROCEDURE — 85025 COMPLETE CBC W/AUTO DIFF WBC: CPT

## 2021-03-21 PROCEDURE — 74011000250 HC RX REV CODE- 250: Performed by: NURSE PRACTITIONER

## 2021-03-21 PROCEDURE — 94640 AIRWAY INHALATION TREATMENT: CPT

## 2021-03-21 PROCEDURE — 36415 COLL VENOUS BLD VENIPUNCTURE: CPT

## 2021-03-21 PROCEDURE — 74011636637 HC RX REV CODE- 636/637: Performed by: EMERGENCY MEDICINE

## 2021-03-21 RX ORDER — INSULIN LISPRO 100 [IU]/ML
18 INJECTION, SOLUTION INTRAVENOUS; SUBCUTANEOUS ONCE
Status: CANCELLED | OUTPATIENT
Start: 2021-03-21 | End: 2021-03-21

## 2021-03-21 RX ORDER — INSULIN LISPRO 100 [IU]/ML
18 INJECTION, SOLUTION INTRAVENOUS; SUBCUTANEOUS ONCE
Status: DISPENSED | OUTPATIENT
Start: 2021-03-21 | End: 2021-03-21

## 2021-03-21 RX ORDER — SODIUM CHLORIDE 9 MG/ML
500 INJECTION, SOLUTION INTRAVENOUS ONCE
Status: CANCELLED | OUTPATIENT
Start: 2021-03-21 | End: 2021-03-21

## 2021-03-21 RX ORDER — INSULIN LISPRO 100 [IU]/ML
15 INJECTION, SOLUTION INTRAVENOUS; SUBCUTANEOUS ONCE
Status: COMPLETED | OUTPATIENT
Start: 2021-03-21 | End: 2021-03-21

## 2021-03-21 RX ORDER — CETIRIZINE HCL 10 MG
10 TABLET ORAL DAILY
Status: DISCONTINUED | OUTPATIENT
Start: 2021-03-21 | End: 2021-03-22 | Stop reason: HOSPADM

## 2021-03-21 RX ADMIN — GABAPENTIN 400 MG: 400 CAPSULE ORAL at 08:14

## 2021-03-21 RX ADMIN — SODIUM CHLORIDE 500 ML: 9 INJECTION, SOLUTION INTRAVENOUS at 02:00

## 2021-03-21 RX ADMIN — AMLODIPINE BESYLATE 5 MG: 5 TABLET ORAL at 08:14

## 2021-03-21 RX ADMIN — ACETAMINOPHEN 650 MG: 325 TABLET ORAL at 11:09

## 2021-03-21 RX ADMIN — INSULIN LISPRO 15 UNITS: 100 INJECTION, SOLUTION INTRAVENOUS; SUBCUTANEOUS at 21:39

## 2021-03-21 RX ADMIN — INSULIN GLARGINE 60 UNITS: 100 INJECTION, SOLUTION SUBCUTANEOUS at 21:40

## 2021-03-21 RX ADMIN — INSULIN LISPRO 18 UNITS: 100 INJECTION, SOLUTION INTRAVENOUS; SUBCUTANEOUS at 00:53

## 2021-03-21 RX ADMIN — IPRATROPIUM BROMIDE AND ALBUTEROL SULFATE 3 ML: .5; 3 SOLUTION RESPIRATORY (INHALATION) at 07:52

## 2021-03-21 RX ADMIN — IPRATROPIUM BROMIDE AND ALBUTEROL SULFATE 3 ML: .5; 3 SOLUTION RESPIRATORY (INHALATION) at 15:30

## 2021-03-21 RX ADMIN — GABAPENTIN 400 MG: 400 CAPSULE ORAL at 21:40

## 2021-03-21 RX ADMIN — ATORVASTATIN CALCIUM 10 MG: 10 TABLET, FILM COATED ORAL at 21:40

## 2021-03-21 RX ADMIN — INSULIN LISPRO 7 UNITS: 100 INJECTION, SOLUTION INTRAVENOUS; SUBCUTANEOUS at 12:21

## 2021-03-21 RX ADMIN — METHYLPREDNISOLONE SODIUM SUCCINATE 60 MG: 125 INJECTION, POWDER, FOR SOLUTION INTRAMUSCULAR; INTRAVENOUS at 08:17

## 2021-03-21 RX ADMIN — CETIRIZINE HYDROCHLORIDE 10 MG: 10 TABLET, FILM COATED ORAL at 16:24

## 2021-03-21 RX ADMIN — PANTOPRAZOLE SODIUM 40 MG: 40 TABLET, DELAYED RELEASE ORAL at 08:14

## 2021-03-21 RX ADMIN — GABAPENTIN 400 MG: 400 CAPSULE ORAL at 16:11

## 2021-03-21 RX ADMIN — INSULIN LISPRO 15 UNITS: 100 INJECTION, SOLUTION INTRAVENOUS; SUBCUTANEOUS at 03:52

## 2021-03-21 RX ADMIN — METHYLPREDNISOLONE SODIUM SUCCINATE 40 MG: 125 INJECTION, POWDER, FOR SOLUTION INTRAMUSCULAR; INTRAVENOUS at 21:39

## 2021-03-21 RX ADMIN — IPRATROPIUM BROMIDE AND ALBUTEROL SULFATE 3 ML: .5; 3 SOLUTION RESPIRATORY (INHALATION) at 11:29

## 2021-03-21 RX ADMIN — FERROUS SULFATE TAB 325 MG (65 MG ELEMENTAL FE) 325 MG: 325 (65 FE) TAB at 16:11

## 2021-03-21 RX ADMIN — FERROUS SULFATE TAB 325 MG (65 MG ELEMENTAL FE) 325 MG: 325 (65 FE) TAB at 08:14

## 2021-03-21 RX ADMIN — INSULIN LISPRO 10 UNITS: 100 INJECTION, SOLUTION INTRAVENOUS; SUBCUTANEOUS at 16:11

## 2021-03-21 RX ADMIN — IPRATROPIUM BROMIDE AND ALBUTEROL SULFATE 3 ML: .5; 3 SOLUTION RESPIRATORY (INHALATION) at 19:27

## 2021-03-21 NOTE — PROGRESS NOTES
Patient with + earlier tonight but no additional treatment, now 507.   Ordered 500cc NS and 18 units lispro with 2 hours recheck    0336 BS down to 356, ordered an additional 15 units lispro

## 2021-03-21 NOTE — PROGRESS NOTES
Progress Note  Date:3/21/2021       Room:214/01  Patient Name:Dennis England Born     YOB: 1945     Age:75 y.o. Subjective    HPI:  Patient is a 70year old male with history of Diabetes, hypertension, COPD and CKD stage III who presents to the emergency department today with complaints of shortness of breath. Of note he was diagnosed with Covid-19 on 02/09/202. He was admitted here at [facility] and treated for Covid-19. After discharge from the hospital, his PCP started him on Eliquis for PE prophylaxis secondary to Covid-19. His stool was positive for occult blood in ER. He has had two other emergency room encounters since discharge for shortness of breath. His hemoglobin noted to have slowly trended down since 02/09/2021. His Hemoglobin today is 7.9 from 11.3 on 02/09/21. Chest xray in ER today shows bilateral opacities not changed from prior. Although he is short of breath his oxygen saturation is 96% on room air. He will be admitted for further management. On follow-up patient seen and evaluated, sitting on side of bed no acute distress. And stated that this morning she had wheezing which improved with nebulizer therapy also was noted to be 90% on room air but on recheck was at 94% repeat H&H stable 8.9 after 1 unit packed blood cells. Repeat chest x-ray shows similar Covid findings. Overnight it was noted that patient glucose increased to 507 and was given regular insulin and repeat this morning was 89    Review of Systems   Constitutional: Negative for chills, diaphoresis, fatigue and fever. HENT: Negative for congestion, ear pain, postnasal drip, sinus pain and sore throat. Eyes: Negative for pain, discharge and redness. Respiratory: Negative for cough, shortness of breath and wheezing. Cardiovascular: Negative for chest pain and palpitations. Gastrointestinal: Negative for abdominal pain, constipation, diarrhea, nausea and vomiting.    Genitourinary: Negative for dysuria, flank pain, frequency and urgency. Musculoskeletal: Negative for arthralgias and myalgias. Neurological: Negative for dizziness, weakness and headaches. Psychiatric/Behavioral: Negative for agitation and hallucinations. The patient is not nervous/anxious. Objective           Vitals Last 24 Hours:  Patient Vitals for the past 24 hrs:   Temp Pulse Resp BP SpO2   03/21/21 1221 98.3 °F (36.8 °C) 97 18 (!) 141/76 95 %   03/21/21 1129     95 %   03/21/21 0752     90 %   03/21/21 0734 98.4 °F (36.9 °C) 86 20 (!) 140/74 96 %   03/21/21 0400 98.1 °F (36.7 °C) 79 20 137/78 94 %   03/21/21 0028 98.1 °F (36.7 °C) 87 20 128/79 95 %   03/20/21 1907     93 %   03/20/21 1906 98.4 °F (36.9 °C) 87 20 134/78 97 %   03/20/21 1554 97.3 °F (36.3 °C) 86 20 111/67 96 %   03/20/21 1415 98.2 °F (36.8 °C) 70 16 107/67 98 %   03/20/21 1315 98.2 °F (36.8 °C) 67 18 99/67         I/O (24Hr): Intake/Output Summary (Last 24 hours) at 3/21/2021 1305  Last data filed at 3/21/2021 1303  Gross per 24 hour   Intake 2510 ml   Output 2450 ml   Net 60 ml       Physical Exam:  General: Alert, cooperative, no distress, appears stated age. Head:  Normocephalic, without obvious abnormality, atraumatic. Eyes:  Conjunctivae/corneas clear. Pupils equal, round, reactive to light. Extraocular movements intact. Lungs: Bilateral air entry left basilar wheeze noted  Chest wall: No tenderness or deformity. Heart:  Regular rate and rhythm, S1, S2 normal, no murmur, click, rub or gallop. Abdomen:  Soft, non-tender. Bowel sounds normal. No masses,  No organomegaly. Extremities: Extremities normal, atraumatic, no cyanosis or edema. Pulses: 2+ and symmetric all extremities. Skin: Skin color, texture, turgor normal. No rashes or lesions  Neurologic: Awake, Alert, oriented.  No obvious gross sensory or motor deficits      Medications           Current Facility-Administered Medications   Medication Dose Route Frequency    insulin lispro (HUMALOG) injection 18 Units  18 Units SubCUTAneous ONCE    methylPREDNISolone (PF) (Solu-MEDROL) injection 40 mg  40 mg IntraVENous Q12H    0.9% sodium chloride infusion 250 mL  250 mL IntraVENous PRN    ferrous sulfate tablet 325 mg  1 Tab Oral BID WITH MEALS    gabapentin (NEURONTIN) capsule 400 mg  400 mg Oral TID    albuterol (PROVENTIL VENTOLIN) nebulizer solution 2.5 mg  2.5 mg Nebulization Q6H PRN    pantoprazole (PROTONIX) tablet 40 mg  40 mg Oral ACB    acetaminophen (TYLENOL) tablet 650 mg  650 mg Oral Q6H PRN    amLODIPine (NORVASC) tablet 5 mg  5 mg Oral DAILY    atorvastatin (LIPITOR) tablet 10 mg  10 mg Oral QHS    insulin glargine (LANTUS) injection 60 Units  60 Units SubCUTAneous QHS    glucose chewable tablet 16 g  4 Tab Oral PRN    dextrose (D50W) injection syrg 12.5-25 g  25-50 mL IntraVENous PRN    glucagon (GLUCAGEN) injection 1 mg  1 mg IntraMUSCular PRN    insulin lispro (HUMALOG) injection   SubCUTAneous AC&HS    ondansetron (ZOFRAN) injection 4 mg  4 mg IntraVENous Q8H PRN    melatonin (rapid dissolve) tablet 5 mg  5 mg Oral QHS PRN    albuterol-ipratropium (DUO-NEB) 2.5 MG-0.5 MG/3 ML  3 mL Nebulization QID RT         Allergies         Patient has no known allergies.        Labs/Imaging/Diagnostics      Labs:  Recent Results (from the past 48 hour(s))   GLUCOSE, POC    Collection Time: 03/19/21  3:50 PM   Result Value Ref Range    Glucose (POC) 133 (H) 65 - 100 mg/dL    Performed by Christine Shay    HGB & HCT    Collection Time: 03/19/21  6:55 PM   Result Value Ref Range    HGB 7.5 (L) 13.5 - 17.5 g/dL    HCT 24.0 (L) 41 - 53 %   GLUCOSE, POC    Collection Time: 03/19/21  8:31 PM   Result Value Ref Range    Glucose (POC) 408 (H) 65 - 100 mg/dL    Performed by Paula LAKHANI    CBC WITH AUTOMATED DIFF    Collection Time: 03/20/21  5:22 AM   Result Value Ref Range    WBC 8.9 4.4 - 11.3 K/uL    RBC 2.17 (L) 4.50 - 5.90 M/uL    HGB 6.8 (L) 13.5 - 17.5 g/dL    HCT 20.7 (L) 41 - 53 %    MCV 95.4 80 - 100 FL    MCH 31.3 31 - 34 PG    MCHC 32.8 31.0 - 36.0 g/dL    RDW 17.8 (H) 11.5 - 14.5 %    PLATELET 392 K/uL    MPV 7.5 6.5 - 11.5 FL    NRBC 0.2  WBC    ABSOLUTE NRBC 0.02 K/uL    NEUTROPHILS 71 42 - 75 %    LYMPHOCYTES 16 (L) 20.5 - 51.1 %    MONOCYTES 12 (H) 1.7 - 9.3 %    EOSINOPHILS 0 (L) 0.9 - 2.9 %    BASOPHILS 1 0.0 - 2.5 %    ABS. NEUTROPHILS 6.4 1.8 - 7.7 K/UL    ABS. LYMPHOCYTES 1.4 1.0 - 4.8 K/UL    ABS. MONOCYTES 1.1 0.2 - 2.4 K/UL    ABS. EOSINOPHILS 0.0 0.0 - 0.7 K/UL    ABS.  BASOPHILS 0.0 0.0 - 0.2 K/UL   METABOLIC PANEL, BASIC    Collection Time: 03/20/21  5:22 AM   Result Value Ref Range    Sodium 140 136 - 145 mmol/L    Potassium 4.5 3.5 - 5.1 mmol/L    Chloride 104 97 - 108 mmol/L    CO2 26 21 - 32 mmol/L    Anion gap 10 5 - 15 mmol/L    Glucose 132 (H) 65 - 100 mg/dL    BUN 65 (H) 6 - 20 mg/dL    Creatinine 2.67 (H) 0.70 - 1.30 mg/dL    BUN/Creatinine ratio 24 (H) 12 - 20      GFR est AA 29 (L) >60 ml/min/1.73m2    GFR est non-AA 24 (L) >60 ml/min/1.73m2    Calcium 8.4 (L) 8.5 - 10.1 mg/dL   TYPE & SCREEN    Collection Time: 03/20/21  6:05 AM   Result Value Ref Range    Crossmatch Expiration 03/23/2021,2359     ABO/Rh(D) O Positive     Antibody screen Negative     Unit number [id number]     Blood component type RC LR     Unit division 00     Status of unit [address] to transfuse     Crossmatch result Compatible    GLUCOSE, POC    Collection Time: 03/20/21  7:27 AM   Result Value Ref Range    Glucose (POC) 66 65 - 100 mg/dL    Performed by Antoinette Eric, POC    Collection Time: 03/20/21  7:52 AM   Result Value Ref Range    Glucose (POC) 80 65 - 100 mg/dL    Performed by Ami Hernandez    HGB & HCT    Collection Time: 03/20/21  9:35 AM   Result Value Ref Range    HGB 7.1 (L) 13.5 - 17.5 g/dL    HCT 22.5 (L) 41 - 53 %   GLUCOSE, POC    Collection Time: 03/20/21 11:27 AM   Result Value Ref Range    Glucose (POC) 339 (H) 65 - 100 mg/dL Performed by [address] [facility], POC    Collection Time: 03/20/21  4:02 PM   Result Value Ref Range    Glucose (POC) 236 (H) 65 - 100 mg/dL    Performed by [address], POC    Collection Time: 03/20/21  7:04 PM   Result Value Ref Range    Glucose (POC) 404 (H) 65 - 100 mg/dL    Performed by [address] [place] [address], POC    Collection Time: 03/21/21 12:28 AM   Result Value Ref Range    Glucose (POC) 507 (H) 65 - 100 mg/dL    Performed by [address] [place] [address], POC    Collection Time: 03/21/21  3:18 AM   Result Value Ref Range    Glucose (POC) 356 (H) 65 - 100 mg/dL    Performed by Liliana LAKHANI    CBC WITH AUTOMATED DIFF    Collection Time: 03/21/21  5:49 AM   Result Value Ref Range    WBC 10.4 4.4 - 11.3 K/uL    RBC 2.98 (L) 4.50 - 5.90 M/uL    HGB 8.9 (L) 13.5 - 17.5 g/dL    HCT 27.2 (L) 41 - 53 %    MCV 91.6 80 - 100 FL    MCH 30.0 (L) 31 - 34 PG    MCHC 32.8 31.0 - 36.0 g/dL    RDW 19.0 (H) 11.5 - 14.5 %    PLATELET 638 K/uL    MPV 7.6 6.5 - 11.5 FL    NRBC 0.2  WBC    ABSOLUTE NRBC 0.02 K/uL    NEUTROPHILS 87 (H) 42 - 75 %    LYMPHOCYTES 7 (L) 20.5 - 51.1 %    MONOCYTES 6 1.7 - 9.3 %    EOSINOPHILS 0 (L) 0.9 - 2.9 %    BASOPHILS 0 0.0 - 2.5 %    ABS. NEUTROPHILS 8.9 (H) 1.8 - 7.7 K/UL    ABS. LYMPHOCYTES 0.8 (L) 1.0 - 4.8 K/UL    ABS. MONOCYTES 0.6 0.2 - 2.4 K/UL    ABS. EOSINOPHILS 0.0 0.0 - 0.7 K/UL    ABS.  BASOPHILS 0.0 0.0 - 0.2 K/UL   METABOLIC PANEL, BASIC    Collection Time: 03/21/21  5:49 AM   Result Value Ref Range    Sodium 138 136 - 145 mmol/L    Potassium 4.6 3.5 - 5.1 mmol/L    Chloride 104 97 - 108 mmol/L    CO2 23 21 - 32 mmol/L    Anion gap 11 5 - 15 mmol/L    Glucose 105 (H) 65 - 100 mg/dL    BUN 60 (H) 6 - 20 mg/dL    Creatinine 2.42 (H) 0.70 - 1.30 mg/dL    BUN/Creatinine ratio 25 (H) 12 - 20      GFR est AA 32 (L) >60 ml/min/1.73m2    GFR est non-AA 26 (L) >60 ml/min/1.73m2    Calcium 8.6 8.5 - 10.1 mg/dL   GLUCOSE, POC    Collection Time: 03/21/21  7:31 AM   Result Value Ref Range    Glucose (POC) 95 65 - 100 mg/dL    Performed by [facility], POC    Collection Time: 03/21/21 12:17 PM   Result Value Ref Range    Glucose (POC) 271 (H) 65 - 100 mg/dL    Performed by Berto Long         Imaging:  Xr Chest Pa Lat    Result Date: 3/21/2021  Patchy bilateral airspace disease consistent with multilobar pneumonia. Xr Chest Port    Result Date: 3/18/2021  Overall similar appearance of bilateral opacities. Assessment//Plan           Problem List:  Hospital Problems  Never Reviewed          Codes Class Noted POA    * (Principal) GI bleed ICD-10-CM: K92.2  ICD-9-CM: 578.9  3/18/2021 Unknown        COPD (chronic obstructive pulmonary disease) ([facility] [address] ICD-10-CM: J44.9  ICD-9-CM: 496  2/13/2021 Yes        CKD (chronic kidney disease) ICD-10-CM: N18.9  ICD-9-CM: 585.9  2/13/2021 Yes              Acute Blood Loss Anemia  - Likely secondary to anticoagulant Eliquis  - Stool is positive for occult blood  - Hemoglobin is 7.9 today from 11.3 on 02/09/21 but on repeat checks continues to be on downward trend 7.5 -> 7.2 ->7.2 ->7.5 ->6.8 - >7.1  -With hemoglobin continues on downward trend slightly we will go ahead and transfuse 1 unit of packed red blood cells  -Iron level was noted to be low at 26 so ferrous sulfate 325 mg oral twice daily with meals started  -Repeat CBC on 3/21 is 8.9      GI Bleed   - Likely secondary to Eliquis therapy started by PCP secondary to recent Covid infection and patient stopped taking medication about 2 to 3 days back  - has hx of gastritis / gastric ulcers on EGD done several years back but states is has been stable.    - Stop Eliquis and will trend hemoglobin level Q 6 hours  - Start Protonix 40 mg IV BID changed to 40 mg oral daily on 3/20  -Status post EGD by Dr. Umang Craft which resulted in antral gastritis and angiodysplasia in the gastric body but no active bleeding noted  -Continue monitor H&H closely      Bilateral Lobe Pneumonia  - Likely residual for recent covid-19 infection  - Rapid Covid-19 result is negative   - Initially started on broad spectrum antiiotic Zosyn but procalcitonin was in normal range so we will discontinue Zosyn  -Repeat chest x-ray on 3/21 PA lateral shows similar findings     COPD exacerbation  He quit smoking 3 months ago  Has some mild scattered wheezes bilaterlly on admission  Continue Duonebs and PO prednisone initially but changed to IV Solu-Medrol 60 mg every 12 hours on 3/20 and decreased to 40 mg IV every 12 hours on 3/21  -Order O2 saturation and keep O2 greater than 92  -Patient appears to have improved symptoms with nebulizer therapies and may need nebulizer on discharge home     Recent history of Covid-19 infection  - Rapid covid-19 test negative today and chest x-ray still shows similar bilateral airspace opacities     Hypertension  - Restart Amlodipine and losartan since creatinine is at baseline for his chronic kidney disease stage III  -Patient on 3/20 receiving 1 unit of blood transfusion so we will give Lasix 20 mg IV x1  - vitals q4hrs      Diabetes Type 2  - A1c was 9.6% on 02/13/21  - Start SSI and lantus at bedtime of 60 units  - Accu check ACHS  - nutrition evaluation   -3/21: Overnight patient's glucose increased to 500 range was given high dose of regular insulin and repeat early this a.m. is a 89.   And repeat was at 271 prior to lunch    Diabetic neuropathy  -Patient is on 300 mg oral 3 times daily and has been on that dose and patient continues to complain of neuropathic pain so on 3/20 increased dosing to 400 mg oral 3 times daily   - monitor closely and hold for sedation    CKD stage III  - Creatinine is 2.66, no significant change from prior.  - Avoid nephrotoxic medications     DVT prophylaxis  Contraindicated secondary to blood loss anemia    Full Code   Patient's daughter is designated as his main decision-maker    Attempted to call patient's daughter Cindy Sanchez at [phone] but unable to reach at this time    Spent 30 minutes evaluting and coordinating patient care of which >50% was spent coordinating and counseling.        Electronically signed by Richard Swenson MD on 3/21/2021 at 9:51 AM

## 2021-03-21 NOTE — PROGRESS NOTES
Problem: Falls - Risk of  Goal: *Absence of Falls  Description: Document Carmenmio Cunningham Fall Risk and appropriate interventions in the flowsheet. Outcome: Progressing Towards Goal  Note: Fall Risk Interventions:            Medication Interventions: Evaluate medications/consider consulting pharmacy         History of Falls Interventions: Room close to nurse's station         Problem: Patient Education: Go to Patient Education Activity  Goal: Patient/Family Education  Outcome: Progressing Towards Goal     Problem: Fluid Volume - Risk of, Imbalanced  Goal: *Balanced intake and output  Outcome: Progressing Towards Goal     Problem: Pressure Injury - Risk of  Goal: *Prevention of pressure injury  Description: Document Austen Scale and appropriate interventions in the flowsheet.   Outcome: Progressing Towards Goal  Note: Pressure Injury Interventions:  Sensory Interventions: Minimize linen layers, Keep linens dry and wrinkle-free         Activity Interventions: PT/OT evaluation    Mobility Interventions: PT/OT evaluation    Nutrition Interventions: Document food/fluid/supplement intake                     Problem: Patient Education: Go to Patient Education Activity  Goal: Patient/Family Education  Outcome: Progressing Towards Goal

## 2021-03-22 VITALS
WEIGHT: 201.44 LBS | HEART RATE: 85 BPM | RESPIRATION RATE: 20 BRPM | SYSTOLIC BLOOD PRESSURE: 134 MMHG | DIASTOLIC BLOOD PRESSURE: 73 MMHG | OXYGEN SATURATION: 95 % | BODY MASS INDEX: 30.63 KG/M2 | TEMPERATURE: 98.2 F

## 2021-03-22 LAB
ANION GAP SERPL CALC-SCNC: 13 MMOL/L (ref 5–15)
BASOPHILS # BLD: 0 K/UL (ref 0–0.2)
BASOPHILS NFR BLD: 0 % (ref 0–2.5)
BUN SERPL-MCNC: 66 MG/DL (ref 6–20)
BUN/CREAT SERPL: 25 (ref 12–20)
CA-I BLD-MCNC: 8.7 MG/DL (ref 8.5–10.1)
CHLORIDE SERPL-SCNC: 100 MMOL/L (ref 97–108)
CO2 SERPL-SCNC: 23 MMOL/L (ref 21–32)
CREAT SERPL-MCNC: 2.64 MG/DL (ref 0.7–1.3)
EOSINOPHIL # BLD: 0 K/UL (ref 0–0.7)
EOSINOPHIL NFR BLD: 0 % (ref 0.9–2.9)
ERYTHROCYTE [DISTWIDTH] IN BLOOD BY AUTOMATED COUNT: 19.4 % (ref 11.5–14.5)
GLUCOSE BLD STRIP.AUTO-MCNC: 293 MG/DL (ref 65–100)
GLUCOSE BLD STRIP.AUTO-MCNC: 296 MG/DL (ref 65–100)
GLUCOSE SERPL-MCNC: 287 MG/DL (ref 65–100)
HCT VFR BLD AUTO: 28.1 % (ref 41–53)
HGB BLD-MCNC: 8.9 G/DL (ref 13.5–17.5)
LYMPHOCYTES # BLD: 1.6 K/UL (ref 1–4.8)
LYMPHOCYTES NFR BLD: 11 % (ref 20.5–51.1)
MCH RBC QN AUTO: 29.7 PG (ref 31–34)
MCHC RBC AUTO-ENTMCNC: 31.8 G/DL (ref 31–36)
MCV RBC AUTO: 93.6 FL (ref 80–100)
MONOCYTES # BLD: 1.4 K/UL (ref 0.2–2.4)
MONOCYTES NFR BLD: 10 % (ref 1.7–9.3)
NEUTS SEG # BLD: 11.8 K/UL (ref 1.8–7.7)
NEUTS SEG NFR BLD: 79 % (ref 42–75)
NRBC # BLD: 0.03 K/UL
NRBC BLD-RTO: 0.2 PER 100 WBC
PERFORMED BY, TECHID: ABNORMAL
PERFORMED BY, TECHID: ABNORMAL
PLATELET # BLD AUTO: 479 K/UL
PMV BLD AUTO: 8.1 FL (ref 6.5–11.5)
POTASSIUM SERPL-SCNC: 4.5 MMOL/L (ref 3.5–5.1)
RBC # BLD AUTO: 3.01 M/UL (ref 4.5–5.9)
SODIUM SERPL-SCNC: 136 MMOL/L (ref 136–145)
WBC # BLD AUTO: 14.9 K/UL (ref 4.4–11.3)

## 2021-03-22 PROCEDURE — 74011250636 HC RX REV CODE- 250/636: Performed by: HOSPITALIST

## 2021-03-22 PROCEDURE — 74011250637 HC RX REV CODE- 250/637: Performed by: HOSPITALIST

## 2021-03-22 PROCEDURE — 74011636637 HC RX REV CODE- 636/637: Performed by: NURSE PRACTITIONER

## 2021-03-22 PROCEDURE — 80048 BASIC METABOLIC PNL TOTAL CA: CPT

## 2021-03-22 PROCEDURE — 74011000250 HC RX REV CODE- 250: Performed by: NURSE PRACTITIONER

## 2021-03-22 PROCEDURE — 36415 COLL VENOUS BLD VENIPUNCTURE: CPT

## 2021-03-22 PROCEDURE — 74011250637 HC RX REV CODE- 250/637: Performed by: NURSE PRACTITIONER

## 2021-03-22 PROCEDURE — 94640 AIRWAY INHALATION TREATMENT: CPT

## 2021-03-22 PROCEDURE — 85025 COMPLETE CBC W/AUTO DIFF WBC: CPT

## 2021-03-22 PROCEDURE — 82962 GLUCOSE BLOOD TEST: CPT

## 2021-03-22 RX ORDER — AMLODIPINE BESYLATE 5 MG/1
5 TABLET ORAL DAILY
Qty: 30 TAB | Refills: 0 | Status: SHIPPED | OUTPATIENT
Start: 2021-03-22 | End: 2021-04-21

## 2021-03-22 RX ORDER — PREDNISONE 10 MG/1
TABLET ORAL
Qty: 14 TAB | Refills: 0 | Status: SHIPPED | OUTPATIENT
Start: 2021-03-22 | End: 2021-03-28

## 2021-03-22 RX ORDER — ALBUTEROL SULFATE 90 UG/1
2 AEROSOL, METERED RESPIRATORY (INHALATION)
Qty: 1 INHALER | Refills: 1 | Status: SHIPPED | OUTPATIENT
Start: 2021-03-22

## 2021-03-22 RX ORDER — MOMETASONE FUROATE AND FORMOTEROL FUMARATE DIHYDRATE 200; 5 UG/1; UG/1
2 AEROSOL RESPIRATORY (INHALATION) 2 TIMES DAILY
Qty: 1 INHALER | Refills: 0 | Status: SHIPPED | OUTPATIENT
Start: 2021-03-22

## 2021-03-22 RX ORDER — LANOLIN ALCOHOL/MO/W.PET/CERES
325 CREAM (GRAM) TOPICAL 2 TIMES DAILY WITH MEALS
Qty: 60 TAB | Refills: 0 | Status: SHIPPED | OUTPATIENT
Start: 2021-03-22

## 2021-03-22 RX ORDER — CETIRIZINE HCL 10 MG
10 TABLET ORAL DAILY
Qty: 30 TAB | Refills: 0 | Status: SHIPPED | OUTPATIENT
Start: 2021-03-23

## 2021-03-22 RX ORDER — GABAPENTIN 400 MG/1
400 CAPSULE ORAL 3 TIMES DAILY
Qty: 90 CAP | Refills: 0 | Status: SHIPPED | OUTPATIENT
Start: 2021-03-22

## 2021-03-22 RX ADMIN — METHYLPREDNISOLONE SODIUM SUCCINATE 40 MG: 125 INJECTION, POWDER, FOR SOLUTION INTRAMUSCULAR; INTRAVENOUS at 08:31

## 2021-03-22 RX ADMIN — CETIRIZINE HYDROCHLORIDE 10 MG: 10 TABLET, FILM COATED ORAL at 08:31

## 2021-03-22 RX ADMIN — GABAPENTIN 400 MG: 400 CAPSULE ORAL at 08:31

## 2021-03-22 RX ADMIN — PANTOPRAZOLE SODIUM 40 MG: 40 TABLET, DELAYED RELEASE ORAL at 08:31

## 2021-03-22 RX ADMIN — INSULIN LISPRO 7 UNITS: 100 INJECTION, SOLUTION INTRAVENOUS; SUBCUTANEOUS at 08:30

## 2021-03-22 RX ADMIN — IPRATROPIUM BROMIDE AND ALBUTEROL SULFATE 3 ML: .5; 3 SOLUTION RESPIRATORY (INHALATION) at 11:45

## 2021-03-22 RX ADMIN — INSULIN LISPRO 7 UNITS: 100 INJECTION, SOLUTION INTRAVENOUS; SUBCUTANEOUS at 12:19

## 2021-03-22 RX ADMIN — IPRATROPIUM BROMIDE AND ALBUTEROL SULFATE 3 ML: .5; 3 SOLUTION RESPIRATORY (INHALATION) at 08:04

## 2021-03-22 RX ADMIN — AMLODIPINE BESYLATE 5 MG: 5 TABLET ORAL at 08:31

## 2021-03-22 RX ADMIN — FERROUS SULFATE TAB 325 MG (65 MG ELEMENTAL FE) 325 MG: 325 (65 FE) TAB at 08:31

## 2021-03-22 RX ADMIN — ACETAMINOPHEN 650 MG: 325 TABLET ORAL at 03:00

## 2021-03-22 NOTE — PROGRESS NOTES
Problem: Falls - Risk of  Goal: *Absence of Falls  Description: Document Cedrick Her Fall Risk and appropriate interventions in the flowsheet. 3/22/2021 1203 by Felipe Dasilva RN  Outcome: Resolved/Met  Note: Fall Risk Interventions:            Medication Interventions: Teach patient to arise slowly         History of Falls Interventions: Room close to nurse's station, Assess for delayed presentation/identification of injury for 48 hrs (comment for end date)      3/22/2021 0745 by Felipe Dasilva RN  Outcome: Progressing Towards Goal  Note: Fall Risk Interventions:            Medication Interventions: Teach patient to arise slowly         History of Falls Interventions: Room close to nurse's station         Problem: Patient Education: Go to Patient Education Activity  Goal: Patient/Family Education  3/22/2021 1203 by Felipe Dasilva RN  Outcome: Resolved/Met  3/22/2021 0745 by Felipe Dasilva RN  Outcome: Progressing Towards Goal     Problem: Fluid Volume - Risk of, Imbalanced  Goal: *Balanced intake and output  3/22/2021 1203 by Felipe Dasilva RN  Outcome: Resolved/Met  3/22/2021 0745 by Felipe Dasilva RN  Outcome: Progressing Towards Goal     Problem: Pressure Injury - Risk of  Goal: *Prevention of pressure injury  Description: Document Austen Scale and appropriate interventions in the flowsheet.   3/22/2021 1203 by Felipe Dasilva RN  Outcome: Resolved/Met  Note: Pressure Injury Interventions:  Sensory Interventions: Minimize linen layers         Activity Interventions: PT/OT evaluation    Mobility Interventions: PT/OT evaluation    Nutrition Interventions: Document food/fluid/supplement intake, Discuss nutritional consult with provider                  3/22/2021 0745 by Felipe Dasilva RN  Outcome: Progressing Towards Goal  Note: Pressure Injury Interventions:  Sensory Interventions: Minimize linen layers         Activity Interventions: PT/OT evaluation    Mobility Interventions: PT/OT evaluation    Nutrition Interventions: Document food/fluid/supplement intake, Discuss nutritional consult with provider                     Problem: Patient Education: Go to Patient Education Activity  Goal: Patient/Family Education  3/22/2021 1203 by Diane Dan RN  Outcome: Resolved/Met  3/22/2021 0745 by Diane Dan, RN  Outcome: Progressing Towards Goal

## 2021-03-22 NOTE — DISCHARGE INSTRUCTIONS
No pending test on time of discharge  If recurrence of any symptoms come back to emergency room for reevaluation

## 2021-03-22 NOTE — PROGRESS NOTES
Visit attempted for patient in 2 acute care [place] [id number] during rounds. Per current isolation protocol in effect, I attempted telechaplaincy via inpatient telephone but Mr. Kiley Duggan did not answer the call. I provided silent prayer. Chaplains will follow up if further referrals are requested.     Chaplain Carmine Torres M.Div.

## 2021-03-22 NOTE — PROGRESS NOTES
.Nutrition Education     · Provided Handouts for Patient reviewed information and called for questions  · Educated on Consistent Carb diet   · Nutrition Diagnosis: Inconsistent cho r/t no prior nutrition education and portion sizes aeb elevated A1C of 9.6   · Nutrition Intervention why a consistent diabetic is important, what are chos, what equals to a cho choice r/t each food group, who many cho choices are needed per meal, tips and provided an example one day menu. · Written educational materials provided from the [facility]. · Refer to Patient Education activity for more details.

## 2021-03-22 NOTE — DISCHARGE SUMMARY
Physician Discharge Summary       Patient: Gabe Kerr MRN: [id number]     YOB: 1945  Age: 70 y.o. Sex: male    PCP: Landen, MD Carrie    Allergies: Patient has no known allergies. Admit date: 3/18/2021  Admitting Provider: Bonny Renee MD    Discharge date: 3/22/2021  Discharging Provider: Bonny Renee MD    * Admission Diagnoses:   GI bleed [K92.2]      * Discharge Diagnoses:    Hospital Problems as of 3/22/2021 Never Reviewed          Codes Class Noted - Resolved POA    * (Principal) GI bleed ICD-10-CM: K92.2  ICD-9-CM: 578.9  3/18/2021 - Present Unknown        COPD (chronic obstructive pulmonary disease) ([facility] ICD-10-CM: J44.9  ICD-9-CM: 496  2/13/2021 - Present Yes        CKD (chronic kidney disease) ICD-10-CM: N18.9  ICD-9-CM: 585.9  2/13/2021 - Present Yes                * Hospital Course:   HPI:  Patient is a 70year old male with history of Diabetes, hypertension, COPD and CKD stage III who presents to the emergency department today with complaints of shortness of breath. Of note he was diagnosed with Covid-19 on 02/09/202. He was admitted here at [address] [place] [address] and treated for Covid-19. After discharge from the hospital, his PCP started him on Eliquis for PE prophylaxis secondary to Covid-19. His stool was positive for occult blood in ER. He has had two other emergency room encounters since discharge for shortness of breath. His hemoglobin noted to have slowly trended down since 02/09/2021. His Hemoglobin today is 7.9 from 11.3 on 02/09/21. Chest xray in ER today shows bilateral opacities not changed from prior. Although he is short of breath his oxygen saturation is 96% on room air. Mei Smoker will be admitted for further management    Acute Blood Loss Anemia  - Likely secondary to anticoagulant Eliquis  - Stool is positive for occult blood  - Hemoglobin is 7.9 today from 11.3 on 02/09/21 but on repeat checks continues to be on downward trend 7.5 -> 7.2 ->7.2 ->7.5 ->6. 8 - >7. 1  -With hemoglobin continues on downward trend slightly we will go ahead and transfuse 1 unit of packed red blood cells  -Iron level was noted to be low at 26 so ferrous sulfate 325 mg oral twice daily with meals started  -Repeat CBC on 3/21 is 8.9 on recheck on 3/22 stable at 8.9        GI Bleed   - Likely secondary to Eliquis therapy started by PCP secondary to recent Covid infection and patient stopped taking medication about 2 to 3 days back  - has hx of gastritis / gastric ulcers on EGD done several years back but states is has been stable.    - Stop Eliquis and will trend hemoglobin level Q 6 hours  - Start Protonix 40 mg IV BID changed to 40 mg oral daily on 3/20  -Status post EGD by Dr. Jordan Sanders which resulted in antral gastritis and angiodysplasia in the gastric body but no active bleeding noted  -Patient will continue on his home Prilosec 40 mg daily follow close with Dr. Jordan Sanders should stay away from NSAIDs as well as discontinue Eliquis therapy        Bilateral Lobe Pneumonia  - Likely residual for recent covid-19 infection  - Rapid Covid-19 result is negative   - Initially started on broad spectrum antiiotic Zosyn but procalcitonin was in normal range so we will discontinue Zosyn  -Repeat chest x-ray on 3/21 PA lateral shows similar findings     COPD exacerbation  He quit smoking 3 months ago  Has some mild scattered wheezes bilaterlly on admission  Continue Duonebs and PO prednisone initially but changed to IV Solu-Medrol 60 mg every 12 hours on 3/20 and decreased to 40 mg IV every 12 hours on 3/21 and on discharge will continue prednisone 10 mg tablets to take 40 mg daily for 2 days then 20 mg daily for 2 days then 10 mg daily for 2 days and then discontinue patient should also continue on albuterol inhaler every 4 hours as needed states that he is on a steroid inhaler as well Spiriva and will have him continue Spiriva and will start patient on Dulera twice daily  Patient oxygen levels stable on room air does have daily a.m. wheezing which improves with inhaler/nebulizer therapy     Recent history of Covid-19 infection  - Rapid covid-19 test negative today and chest x-ray still shows similar bilateral airspace opacities     Hypertension  - Restart Amlodipine and losartan since creatinine is at baseline for his chronic kidney disease stage III  -Patient on 3/20 receiving 1 unit of blood transfusion so we will give Lasix 20 mg IV x1  -Labile blood pressure 137/71 with a heart rate of 84 on day of discharge     Diabetes Type 2  - A1c was 9.6% on 02/13/21  - Start SSI and lantus at bedtime of 60 units  - Accu check ACHS  - nutrition evaluation   -3/21: Overnight patient's glucose increased to 500 range was given high dose of regular insulin and repeat early this a.m. is a 89. And repeat was at 271 prior to lunch  -Patient glucose levels uncontrolled continues to ask for double portions and increase her sugars up to 501 point patient educated about diabetic control and his neuropathy and glucose levels this morning is at 296 should improve with continued tapering of prednisone but also home health should educate on diabetic diet and monitor Accu-Cheks closely     Diabetic neuropathy  -Patient is on 300 mg oral 3 times daily and has been on that dose and patient continues to complain of neuropathic pain so on 3/20 increased dosing to 400 mg oral 3 times daily   - monitor closely and hold for sedation     CKD stage III  - Creatinine is 2.66, no significant change from prior.  - Avoid nephrotoxic medications     * Procedures:   Procedure(s):  ESOPHAGOGASTRODUODENOSCOPY (EGD)     IMPRESSION:         1. Antral gastritis  2. Angiodysplasia in the gastric body                   RECOMMENDATIONS:     1. Check biopsy results. 2. Start patient on a daily PPI. 3. Patient to remain off all blood thinners and NSAIDs. 4. Have patient follow-up in the office for follow-up visit and possible further work-up.     Consults: GI  Assessment:      1.  GI bleeding. 2.  Acute blood loss anemia  3. Bilateral pneumonia  4. Recent COVID-19 infection   5. Chronic kidney disease stage III  6. Diabetes mellitus type II     Plan:      1.  Closely monitor H&H and stools of blood. 2.  Transfuse if needed to maintain hematocrit of 25-30.   3.  Daily PPI therapy. 4.  Agree with stopping the Eliquis. 5.  Patient has been scheduled for an upper endoscopy today to further evaluate for cause of the bleeding. If none found will consider obtaining a colonoscopy to look at the lower digestive tract. This can be done as outpatient if his H&H remained stable. 6.  Advised to avoid use of NSAIDs. Vitals Last 24 Hours:  Patient Vitals for the past 24 hrs:   Temp Pulse Resp BP SpO2   03/22/21 0739 98.1 °F (36.7 °C) 84 20 137/71 (!) 6 %   03/22/21 0320 98.5 °F (36.9 °C) 98 20 (!) 157/78 97 %   03/22/21 0015 98.1 °F (36.7 °C) 98 20 (!) 148/78 95 %   03/21/21 1948 98.1 °F (36.7 °C) (!) 101 20 (!) 174/95 95 %   03/21/21 1927     95 %   03/21/21 1926     95 %   03/21/21 1541 98.2 °F (36.8 °C) 96 18 129/69 95 %   03/21/21 1530     96 %   03/21/21 1221 98.3 °F (36.8 °C) 97 18 (!) 141/76 95 %   03/21/21 1129     95 %        Discharge Exam:  General: Alert, cooperative, no distress, appears stated age. Head:  Normocephalic, without obvious abnormality, atraumatic. Eyes:  Conjunctivae/corneas clear. Pupils equal, round, reactive to light. Extraocular movements intact. Lungs:  Clear to auscultation bilaterally. no wheeze, rales, crackles, rhonchi   Chest wall: No tenderness or deformity. Heart:  Regular rate and rhythm, S1, S2 normal, no murmur, click, rub or gallop. Abdomen:  Soft, non-tender. Bowel sounds normal. No masses,  No organomegaly. Extremities: Extremities normal, atraumatic, no cyanosis or edema. Pulses: 2+ and symmetric all extremities.   Skin: Skin color, texture, turgor normal. No rashes or lesions  Neurologic: Awake, Alert, oriented. No obvious gross sensory or motor deficits    Labs:  Recent Results (from the past 24 hour(s))   GLUCOSE, POC    Collection Time: 03/21/21 12:17 PM   Result Value Ref Range    Glucose (POC) 271 (H) 65 - 100 mg/dL    Performed by Carmita Hernandes    GLUCOSE, POC    Collection Time: 03/21/21  3:43 PM   Result Value Ref Range    Glucose (POC) 334 (H) 65 - 100 mg/dL    Performed by [address] [place] [address], POC    Collection Time: 03/21/21  9:02 PM   Result Value Ref Range    Glucose (POC) 433 (H) 65 - 100 mg/dL    Performed by Ruth Ann LAKHANI    CBC WITH AUTOMATED DIFF    Collection Time: 03/22/21  5:54 AM   Result Value Ref Range    WBC 14.9 (H) 4.4 - 11.3 K/uL    RBC 3.01 (L) 4.50 - 5.90 M/uL    HGB 8.9 (L) 13.5 - 17.5 g/dL    HCT 28.1 (L) 41 - 53 %    MCV 93.6 80 - 100 FL    MCH 29.7 (L) 31 - 34 PG    MCHC 31.8 31.0 - 36.0 g/dL    RDW 19.4 (H) 11.5 - 14.5 %    PLATELET 464 K/uL    MPV 8.1 6.5 - 11.5 FL    NRBC 0.2  WBC    ABSOLUTE NRBC 0.03 K/uL    NEUTROPHILS 79 (H) 42 - 75 %    LYMPHOCYTES 11 (L) 20.5 - 51.1 %    MONOCYTES 10 (H) 1.7 - 9.3 %    EOSINOPHILS 0 (L) 0.9 - 2.9 %    BASOPHILS 0 0.0 - 2.5 %    ABS. NEUTROPHILS 11.8 (H) 1.8 - 7.7 K/UL    ABS. LYMPHOCYTES 1.6 1.0 - 4.8 K/UL    ABS. MONOCYTES 1.4 0.2 - 2.4 K/UL    ABS. EOSINOPHILS 0.0 0.0 - 0.7 K/UL    ABS.  BASOPHILS 0.0 0.0 - 0.2 K/UL   METABOLIC PANEL, BASIC    Collection Time: 03/22/21  5:54 AM   Result Value Ref Range    Sodium 136 136 - 145 mmol/L    Potassium 4.5 3.5 - 5.1 mmol/L    Chloride 100 97 - 108 mmol/L    CO2 23 21 - 32 mmol/L    Anion gap 13 5 - 15 mmol/L    Glucose 287 (H) 65 - 100 mg/dL    BUN 66 (H) 6 - 20 mg/dL    Creatinine 2.64 (H) 0.70 - 1.30 mg/dL    BUN/Creatinine ratio 25 (H) 12 - 20      GFR est AA 29 (L) >60 ml/min/1.73m2    GFR est non-AA 24 (L) >60 ml/min/1.73m2    Calcium 8.7 8.5 - 10.1 mg/dL   GLUCOSE, POC    Collection Time: 03/22/21  7:47 AM   Result Value Ref Range    Glucose (POC) 296 (H) 65 - 100 mg/dL    Performed by King Shrestha          Imaging:  Xr Chest Pa Lat    Result Date: 3/21/2021  Patchy bilateral airspace disease consistent with multilobar pneumonia. Xr Chest Port    Result Date: 3/18/2021  Overall similar appearance of bilateral opacities. * Discharge Condition: improved  * Disposition: Home w/Family and HH PT, OT, RN      Discharge Medications:  Current Discharge Medication List      START taking these medications    Details   cetirizine (ZYRTEC) 10 mg tablet Take 1 Tab by mouth daily. Qty: 30 Tab, Refills: 0      ferrous sulfate 325 mg (65 mg iron) tablet Take 1 Tab by mouth two (2) times daily (with meals). Qty: 60 Tab, Refills: 0      predniSONE (DELTASONE) 10 mg tablet Take 40 mg by mouth daily (with breakfast) for 2 days, THEN 20 mg daily (with breakfast) for 2 days, THEN 10 mg daily (with breakfast) for 2 days. Qty: 14 Tab, Refills: 0         CONTINUE these medications which have CHANGED    Details   albuterol (PROVENTIL HFA, VENTOLIN HFA, PROAIR HFA) 90 mcg/actuation inhaler Take 2 Puffs by inhalation every four (4) hours as needed for Wheezing. Qty: 1 Inhaler, Refills: 1      amLODIPine (NORVASC) 5 mg tablet Take 1 Tab by mouth daily for 30 days. Qty: 30 Tab, Refills: 0      gabapentin (NEURONTIN) 400 mg capsule Take 1 Cap by mouth three (3) times daily. Max Daily Amount: 1,200 mg. Qty: 90 Cap, Refills: 0    Associated Diagnoses: Diabetic autonomic neuropathy associated with diabetes mellitus due to underlying condition ([facility]         CONTINUE these medications which have NOT CHANGED    Details   atorvastatin (LIPITOR) 10 mg tablet Take 10 mg by mouth At bedtime. !! HumaLOG KwikPen Insulin 100 unit/mL kwikpen INJECT 26 UNITS UNDER THE SKIN IN THE MORNING AND 22 UNITS AT SUPPER      losartan (COZAAR) 50 mg tablet TAKE 1 TABLET BY MOUTH EVERY DAY      insulin aspart (NOVOLOG) 100 unit/mL injection 26 Units by SubCUTAneous route Every morning.       aspirin delayed-release 81 mg tablet Take 81 mg by mouth daily. ondansetron (ZOFRAN ODT) 4 mg disintegrating tablet Take 1 Tab by mouth every eight (8) hours as needed for Nausea. Qty: 12 Tab, Refills: 0      acetaminophen (TYLENOL) 500 mg tablet TAKE 2 TABLETS BY MOUTH EVERY 6 (SIX) HOURS AS NEEDED FOR PAIN      cetirizine-pseudoePHEDrine (ZyrTEC-D) 5-120 mg Tb12 Take 1 Tab by mouth two (2) times daily as needed. !! insulin lispro (HUMALOG) 100 unit/mL kwikpen 22 Units by SubCUTAneous route. insulin lispro (HUMALOG) 100 unit/mL injection by SubCUTAneous route. omeprazole (PRILOSEC) 40 mg capsule Take 40 mg by mouth daily. traMADol (ULTRAM) 50 mg tablet Take 50 mg by mouth every six (6) hours as needed for Pain. insulin glargine (LANTUS) 100 unit/mL injection 60 Units by SubCUTAneous route nightly. cholecalciferol (VITAMIN D3) 1,000 unit cap Take 1,000 Units by mouth daily. !! - Potential duplicate medications found. Please discuss with provider. STOP taking these medications       apixaban (Eliquis) 2.5 mg tablet Comments:   Reason for Stopping:         amoxicillin-clavulanate (Augmentin) 875-125 mg per tablet Comments:   Reason for Stopping:         ascorbic acid, vitamin C, (VITAMIN C) 1,000 mg tablet Comments:   Reason for Stopping:         zinc sulfate (ZINCATE) 220 (50) mg capsule Comments:   Reason for Stopping:         methadone (DOLOPHINE) 10 mg tablet Comments:   Reason for Stopping:                 * Follow-up Care/Patient Instructions:   Activity: Activity as tolerated  Diet: Cardiac Diet and Diabetic Diet      Follow-up Information     Follow up With Specialties Details Why Contact Info    Dr. Jordan Smith  On 3/25/2021 10:00 Keren Leung [place]    Alen Dunne MD Gastroenterology On 4/27/2021 @ 2:45 [address]  [phone]      Carrie Schuster MD    Patient can only remember the practice name and not the physician Spent 35 minutes evaluting and coordinating patient care of which >50% was spent coordinating and counseling.        Signed:  Herve Sims MD  3/22/2021  9:53 AM

## 2021-03-22 NOTE — PROGRESS NOTES
Problem: Falls - Risk of  Goal: *Absence of Falls  Description: Document Carline Carter Fall Risk and appropriate interventions in the flowsheet. Outcome: Progressing Towards Goal  Note: Fall Risk Interventions:            Medication Interventions: Teach patient to arise slowly         History of Falls Interventions: Room close to nurse's station         Problem: Patient Education: Go to Patient Education Activity  Goal: Patient/Family Education  Outcome: Progressing Towards Goal     Problem: Fluid Volume - Risk of, Imbalanced  Goal: *Balanced intake and output  Outcome: Progressing Towards Goal     Problem: Pressure Injury - Risk of  Goal: *Prevention of pressure injury  Description: Document Austen Scale and appropriate interventions in the flowsheet.   Outcome: Progressing Towards Goal  Note: Pressure Injury Interventions:  Sensory Interventions: Minimize linen layers         Activity Interventions: PT/OT evaluation    Mobility Interventions: PT/OT evaluation    Nutrition Interventions: Document food/fluid/supplement intake, Discuss nutritional consult with provider                     Problem: Patient Education: Go to Patient Education Activity  Goal: Patient/Family Education  Outcome: Progressing Towards Goal

## 2021-03-23 ENCOUNTER — TELEPHONE (OUTPATIENT)
Dept: GASTROENTEROLOGY | Age: 76
End: 2021-03-23

## 2021-03-23 NOTE — LETTER
3/31/2021 4:38 PM 
 
Mr. Maylin Murillo 
[address] [address] We have made several attempts to reach you by phone, about test results. Please contact our office at [phone]. Thank you, we look forward to hearing from you. Sincerely Sincerely, Cornel Gerard MD

## 2021-03-24 LAB
ABO + RH BLD: NORMAL
BLD PROD TYP BPU: NORMAL
BLOOD GROUP ANTIBODIES SERPL: NEGATIVE
BPU ID: NORMAL
CROSSMATCH RESULT,%XM: NORMAL
SPECIMEN EXP DATE BLD: NORMAL
STATUS OF UNIT,%ST: NORMAL
TRANSFUSION STATUS PATIENT QL: NORMAL
UNIT DIVISION, %UDIV: 0

## 2021-03-26 ENCOUNTER — PATIENT OUTREACH (OUTPATIENT)
Dept: CASE MANAGEMENT | Age: 76
End: 2021-03-26

## 2021-03-26 NOTE — PROGRESS NOTES
3/26/21 Per chart review, patient has returned to hospital on 3/18-3/22/21, this Covid HONEY episode is resolved. No further ACM contact is scheduled at this time.  PAMB

## 2021-05-10 ENCOUNTER — HOSPITAL ENCOUNTER (EMERGENCY)
Age: 76
Discharge: HOME | End: 2021-05-11
Attending: EMERGENCY MEDICINE
Payer: MEDICARE

## 2021-05-10 VITALS
OXYGEN SATURATION: 100 % | BODY MASS INDEX: 31.83 KG/M2 | WEIGHT: 210 LBS | HEART RATE: 94 BPM | SYSTOLIC BLOOD PRESSURE: 146 MMHG | RESPIRATION RATE: 20 BRPM | HEIGHT: 68 IN | TEMPERATURE: 98.4 F | DIASTOLIC BLOOD PRESSURE: 86 MMHG

## 2021-05-10 DIAGNOSIS — G89.4 CHRONIC PAIN SYNDROME: Primary | ICD-10-CM

## 2021-05-10 PROCEDURE — 96372 THER/PROPH/DIAG INJ SC/IM: CPT

## 2021-05-10 PROCEDURE — 74011250636 HC RX REV CODE- 250/636: Performed by: EMERGENCY MEDICINE

## 2021-05-10 PROCEDURE — 74011250637 HC RX REV CODE- 250/637: Performed by: EMERGENCY MEDICINE

## 2021-05-10 PROCEDURE — 99282 EMERGENCY DEPT VISIT SF MDM: CPT

## 2021-05-10 RX ORDER — OXYCODONE AND ACETAMINOPHEN 5; 325 MG/1; MG/1
1 TABLET ORAL
Status: COMPLETED | OUTPATIENT
Start: 2021-05-10 | End: 2021-05-10

## 2021-05-10 RX ORDER — KETOROLAC TROMETHAMINE 30 MG/ML
30 INJECTION, SOLUTION INTRAMUSCULAR; INTRAVENOUS
Status: COMPLETED | OUTPATIENT
Start: 2021-05-10 | End: 2021-05-10

## 2021-05-10 RX ADMIN — KETOROLAC TROMETHAMINE 30 MG: 30 INJECTION, SOLUTION INTRAMUSCULAR at 22:40

## 2021-05-10 RX ADMIN — OXYCODONE AND ACETAMINOPHEN 1 TABLET: 5; 325 TABLET ORAL at 22:40

## 2021-05-11 NOTE — ED TRIAGE NOTES
Pt is in complaining of neuropathy pain in his bilateral feet, hands, and the right side of his neck. Pt states he has been dealing with this pain for a year and has been trying to get into a pain specialist but \"its been 5 months now and I'm still waiting\". Pt states that the pain has progressed to his left knee and left side of his neck for 2 days, the knee pain is making it hard for him to walk.

## 2021-05-11 NOTE — ED NOTES
Pt verbalized undrestanding of med and f/u instructions. Has home health appt tomorrow, will follow up with pcp if unable to resolve pain management issue with home nurse.

## 2021-05-11 NOTE — ED PROVIDER NOTES
Patient presents with complaint of diffuse pain for 1 year. C/O pain in his feet, hands and right side of his neck. In past 2 days with increased pain in left knee and right side of neck. No recent trauma. Pain is worse with walking , movement or palpation. Duration:  Increased over past 2 days. Intensity: severe    Modified by: walking , movement or palpation.                 Past Medical History:   Diagnosis Date    Asthma     B12 deficiency     CAD (coronary artery disease)     Cataract     Chronic obstructive pulmonary disease (HCC)     CKD (chronic kidney disease)     COVID-19     Depression     Diabetes (HCC)     DJD (degenerative joint disease)     [facility]     Hypertension     Long term prescription opiate use     Neuropathy     Rheumatoid arthritis (HCC)     Ulcer        Past Surgical History:   Procedure Laterality Date    HX ANGIOPLASTY      HX HEENT      HX ORTHOPAEDIC      NC CARDIAC SURG PROCEDURE UNLIST      RCA stent         Family History:   Problem Relation Age of Onset    Diabetes Mother     Asthma Mother     Diabetes Father        Social History     Socioeconomic History    Marital status: SINGLE     Spouse name: Not on file    Number of children: Not on file    Years of education: Not on file    Highest education level: Not on file   Occupational History    Not on file   Social Needs    Financial resource strain: Not on file    Food insecurity     Worry: Not on file     Inability: Not on file    Transportation needs     Medical: Not on file     Non-medical: Not on file   Tobacco Use    Smoking status: Former Smoker    Smokeless tobacco: Former User   Substance and Sexual Activity    Alcohol use: No    Drug use: No    Sexual activity: Not on file   Lifestyle    Physical activity     Days per week: Not on file     Minutes per session: Not on file    Stress: Not on file   Relationships    Social connections     Talks on phone: Not on file Gets together: Not on file     Attends [employer] service: Not on file     Active member of club or organization: Not on file     Attends meetings of clubs or organizations: Not on file     Relationship status: Not on file    Intimate partner violence     Fear of current or ex partner: Not on file     Emotionally abused: Not on file     Physically abused: Not on file     Forced sexual activity: Not on file   Other Topics Concern    Not on file   Social History Narrative    Not on file         ALLERGIES: Patient has no known allergies. Review of Systems   Constitutional: Negative. HENT: Negative. Eyes: Negative. Respiratory: Negative. Cardiovascular: Negative. Endocrine: Negative. Genitourinary: Negative. Musculoskeletal: Positive for arthralgias, back pain and myalgias. Skin: Negative. Allergic/Immunologic: Negative. Neurological: Positive for numbness. Tingling   Hematological: Negative. Psychiatric/Behavioral: Negative. Vitals:    05/10/21 2155 05/10/21 2159   BP:  (!) 146/86   Pulse: 94    Resp: 20    Temp: 98.4 °F (36.9 °C)    SpO2: 100%    Weight: 95.3 kg (210 lb)    Height: 5' 8\" (1.727 m)             Physical Exam  Vitals signs and nursing note reviewed. HENT:      Head: Normocephalic and atraumatic. Neck:      Musculoskeletal: Normal range of motion and neck supple. Cardiovascular:      Rate and Rhythm: Normal rate and regular rhythm. Pulses: Normal pulses. Heart sounds: Normal heart sounds. Pulmonary:      Effort: Pulmonary effort is normal.      Breath sounds: Normal breath sounds. Abdominal:      General: Abdomen is flat. Bowel sounds are normal.      Palpations: Abdomen is soft. Musculoskeletal: Normal range of motion. Skin:     General: Skin is warm and dry. Neurological:      General: No focal deficit present. Mental Status: He is oriented to person, place, and time. Mental status is at baseline.       Gait: Gait abnormal. Psychiatric:         Mood and Affect: Mood normal.         Behavior: Behavior normal.          MDM       Procedures

## 2021-05-23 ENCOUNTER — HOSPITAL ENCOUNTER (EMERGENCY)
Age: 76
Discharge: HOME | End: 2021-05-23
Attending: EMERGENCY MEDICINE
Payer: MEDICARE

## 2021-05-23 ENCOUNTER — APPOINTMENT (OUTPATIENT)
Dept: CT IMAGING | Age: 76
End: 2021-05-23
Attending: EMERGENCY MEDICINE
Payer: MEDICARE

## 2021-05-23 VITALS
HEIGHT: 68 IN | WEIGHT: 215 LBS | SYSTOLIC BLOOD PRESSURE: 122 MMHG | BODY MASS INDEX: 32.58 KG/M2 | DIASTOLIC BLOOD PRESSURE: 87 MMHG | TEMPERATURE: 97.3 F | HEART RATE: 98 BPM | RESPIRATION RATE: 19 BRPM | OXYGEN SATURATION: 95 %

## 2021-05-23 DIAGNOSIS — R10.84 ABDOMINAL PAIN, GENERALIZED: Primary | ICD-10-CM

## 2021-05-23 DIAGNOSIS — N18.9 CHRONIC RENAL IMPAIRMENT, UNSPECIFIED CKD STAGE: ICD-10-CM

## 2021-05-23 LAB
ALBUMIN SERPL-MCNC: 3.2 G/DL (ref 3.5–5)
ALBUMIN/GLOB SERPL: 0.8 {RATIO} (ref 1.1–2.2)
ALP SERPL-CCNC: 79 U/L (ref 45–117)
ALT SERPL-CCNC: 23 U/L (ref 12–78)
ANION GAP SERPL CALC-SCNC: 9 MMOL/L (ref 5–15)
AST SERPL W P-5'-P-CCNC: 13 U/L (ref 15–37)
BASOPHILS # BLD: 0.1 K/UL (ref 0–0.2)
BASOPHILS NFR BLD: 1 % (ref 0–2.5)
BILIRUB SERPL-MCNC: 0.3 MG/DL (ref 0.2–1)
BUN SERPL-MCNC: 32 MG/DL (ref 6–20)
BUN/CREAT SERPL: 16 (ref 12–20)
CA-I BLD-MCNC: 9.2 MG/DL (ref 8.5–10.1)
CHLORIDE SERPL-SCNC: 104 MMOL/L (ref 97–108)
CO2 SERPL-SCNC: 25 MMOL/L (ref 21–32)
CREAT SERPL-MCNC: 2.06 MG/DL (ref 0.7–1.3)
EOSINOPHIL # BLD: 0.1 K/UL (ref 0–0.7)
EOSINOPHIL NFR BLD: 1 % (ref 0.9–2.9)
ERYTHROCYTE [DISTWIDTH] IN BLOOD BY AUTOMATED COUNT: 15.6 % (ref 11.5–14.5)
GLOBULIN SER CALC-MCNC: 4.1 G/DL (ref 2–4)
GLUCOSE SERPL-MCNC: 264 MG/DL (ref 65–100)
HCT VFR BLD AUTO: 38.9 % (ref 41–53)
HGB BLD-MCNC: 13.1 G/DL (ref 13.5–17.5)
LIPASE SERPL-CCNC: 35 U/L (ref 73–393)
LYMPHOCYTES # BLD: 1.3 K/UL (ref 1–4.8)
LYMPHOCYTES NFR BLD: 20 % (ref 20.5–51.1)
MCH RBC QN AUTO: 32.4 PG (ref 31–34)
MCHC RBC AUTO-ENTMCNC: 33.7 G/DL (ref 31–36)
MCV RBC AUTO: 96.1 FL (ref 80–100)
MONOCYTES # BLD: 0.5 K/UL (ref 0.2–2.4)
MONOCYTES NFR BLD: 7 % (ref 1.7–9.3)
NEUTS SEG # BLD: 4.5 K/UL (ref 1.8–7.7)
NEUTS SEG NFR BLD: 70 % (ref 42–75)
NRBC # BLD: 0.01 K/UL
NRBC BLD-RTO: 0.2 PER 100 WBC
PLATELET # BLD AUTO: 274 K/UL (ref 150–400)
PMV BLD AUTO: 8.8 FL (ref 6.5–11.5)
POTASSIUM SERPL-SCNC: 4.3 MMOL/L (ref 3.5–5.1)
PROT SERPL-MCNC: 7.3 G/DL (ref 6.4–8.2)
RBC # BLD AUTO: 4.04 M/UL (ref 4.5–5.9)
SODIUM SERPL-SCNC: 138 MMOL/L (ref 136–145)
WBC # BLD AUTO: 6.5 K/UL (ref 4.4–11.3)

## 2021-05-23 PROCEDURE — 74176 CT ABD & PELVIS W/O CONTRAST: CPT

## 2021-05-23 PROCEDURE — 83690 ASSAY OF LIPASE: CPT

## 2021-05-23 PROCEDURE — 99283 EMERGENCY DEPT VISIT LOW MDM: CPT

## 2021-05-23 PROCEDURE — 74011250637 HC RX REV CODE- 250/637: Performed by: EMERGENCY MEDICINE

## 2021-05-23 PROCEDURE — 80053 COMPREHEN METABOLIC PANEL: CPT

## 2021-05-23 PROCEDURE — 85025 COMPLETE CBC W/AUTO DIFF WBC: CPT

## 2021-05-23 RX ORDER — ACETAMINOPHEN 325 MG/1
650 TABLET ORAL
Status: COMPLETED | OUTPATIENT
Start: 2021-05-23 | End: 2021-05-23

## 2021-05-23 RX ADMIN — ACETAMINOPHEN 650 MG: 325 TABLET ORAL at 17:13

## 2021-05-23 NOTE — ED TRIAGE NOTES
Pt reports stomach pain that began 5-. Pt states he has hx of gastric ulcers and believes it may be caused by that. Pt rates pain at 9/10. Pt denies drinking alcohol or eating heavy meals.

## 2021-05-23 NOTE — ED PROVIDER NOTES
HPI   Patient reports generalized abdominal pain over the last 24 to 36 hours. It is described as a dull and aching and fullness. He reports vomiting twice yesterday but did not vomit today and took p.o. this morning. He also reports he has not had a bowel movement yet today. Patient reports a history of peptic ulcer disease. He denies melena and GI bleeding. Patient also reports ongoing chronic bilateral hand and foot pain due to neuropathy. Denies recent travel, sick contacts, new medicines.   Past Medical History:   Diagnosis Date    Asthma     B12 deficiency     CAD (coronary artery disease)     Cataract     Chronic obstructive pulmonary disease (HCC)     CKD (chronic kidney disease)     COVID-19     Depression     Diabetes (HCC)     DJD (degenerative joint disease)     [facility] ([facility] [address]     Hypertension     Long term prescription opiate use     Neuropathy     Rheumatoid arthritis (HCC)     Ulcer        Past Surgical History:   Procedure Laterality Date    HX ANGIOPLASTY      HX HEENT      HX ORTHOPAEDIC      [place] CARDIAC SURG PROCEDURE UNLIST      RCA stent         Family History:   Problem Relation Age of Onset    Diabetes Mother     Asthma Mother     Diabetes Father        Social History     Socioeconomic History    Marital status: SINGLE     Spouse name: Not on file    Number of children: Not on file    Years of education: Not on file    Highest education level: Not on file   Occupational History    Not on file   Tobacco Use    Smoking status: Former Smoker    Smokeless tobacco: Former User   Substance and Sexual Activity    Alcohol use: No    Drug use: No    Sexual activity: Not on file   Other Topics Concern    Not on file   Social History Narrative    Not on file     Social Determinants of Health     Financial Resource Strain:     Difficulty of Paying Living Expenses:    Food Insecurity:     Worried About Running Out of Food in the Last Year:     [employer] in the Last Year:    Transportation Needs:     Lack of Transportation (Medical):  Lack of Transportation (Non-Medical):    Physical Activity:     Days of Exercise per Week:     Minutes of Exercise per Session:    Stress:     Feeling of Stress :    Social Connections:     Frequency of Communication with Friends and Family:     Frequency of Social Gatherings with Friends and Family:     Attends Sikhism Services:     Active Member of Clubs or Organizations:     Attends Club or Organization Meetings:     Marital Status:    Intimate Partner Violence:     Fear of Current or Ex-Partner:     Emotionally Abused:     Physically Abused:     Sexually Abused: ALLERGIES: Patient has no known allergies. Review of Systems   Constitutional: Negative. HENT: Negative. Eyes: Negative. Respiratory: Negative. Cardiovascular: Negative. Gastrointestinal: Positive for abdominal pain. Endocrine: Negative. Genitourinary: Negative. Musculoskeletal: Negative. Skin: Negative. Allergic/Immunologic: Negative. Neurological:        Neuropathy   Hematological: Negative. Psychiatric/Behavioral: Negative. All other systems reviewed and are negative. Vitals:    05/23/21 1346   BP: 122/87   Pulse: 98   Resp: 19   Temp: 97.3 °F (36.3 °C)   SpO2: 95%   Weight: 97.5 kg (215 lb)   Height: 5' 8\" (1.727 m)            Physical Exam  Vitals and nursing note reviewed. Constitutional:       General: He is not in acute distress. Appearance: Normal appearance. He is normal weight. He is not ill-appearing, toxic-appearing or diaphoretic. HENT:      Head: Normocephalic and atraumatic. Nose: Nose normal. No congestion. Mouth/Throat:      Mouth: Mucous membranes are moist.   Eyes:      Extraocular Movements: Extraocular movements intact. Pupils: Pupils are equal, round, and reactive to light. Cardiovascular:      Rate and Rhythm: Normal rate and regular rhythm.       Heart sounds: Normal heart sounds. Pulmonary:      Effort: Pulmonary effort is normal. No respiratory distress. Abdominal:      General: Abdomen is flat. Bowel sounds are normal. There is no distension. Palpations: Abdomen is soft. There is no mass. Tenderness: There is no abdominal tenderness. There is no guarding or rebound. Hernia: No hernia is present. Comments: Hematoma left lower quadrant at site of patient's insulin injections. Musculoskeletal:         General: No swelling, tenderness, deformity or signs of injury. Normal range of motion. Cervical back: Normal range of motion. Right lower leg: No edema. Left lower leg: No edema. Skin:     General: Skin is warm and dry. Neurological:      General: No focal deficit present. Mental Status: He is alert and oriented to person, place, and time. Mental status is at baseline. Cranial Nerves: No cranial nerve deficit. Sensory: No sensory deficit. Motor: No weakness. Coordination: Coordination normal.      Gait: Gait normal.   Psychiatric:         Mood and Affect: Mood normal.          MDM     Exam is reassuring. Given patient's age and history of PUD, will check labs and CT scan. Disposition to be determined. Work-up reveals suggestion of cystitis via CT. Patient denies any urinary complaints and has not produced urine for us. He does not have a leukocytosis or suprapubic pain. Okay for discharge.   Procedures

## 2021-05-24 ENCOUNTER — APPOINTMENT (OUTPATIENT)
Dept: GENERAL RADIOLOGY | Age: 76
End: 2021-05-24
Attending: EMERGENCY MEDICINE
Payer: MEDICARE

## 2021-05-24 ENCOUNTER — HOSPITAL ENCOUNTER (EMERGENCY)
Age: 76
Discharge: HOME | End: 2021-05-24
Attending: EMERGENCY MEDICINE
Payer: MEDICARE

## 2021-05-24 VITALS
SYSTOLIC BLOOD PRESSURE: 166 MMHG | HEART RATE: 91 BPM | TEMPERATURE: 98.3 F | OXYGEN SATURATION: 98 % | DIASTOLIC BLOOD PRESSURE: 89 MMHG | HEIGHT: 68 IN | WEIGHT: 220 LBS | RESPIRATION RATE: 16 BRPM | BODY MASS INDEX: 33.34 KG/M2

## 2021-05-24 DIAGNOSIS — R10.84 ABDOMINAL PAIN, GENERALIZED: Primary | ICD-10-CM

## 2021-05-24 LAB
ALBUMIN SERPL-MCNC: 3.3 G/DL (ref 3.5–5)
ALBUMIN/GLOB SERPL: 0.8 {RATIO} (ref 1.1–2.2)
ALP SERPL-CCNC: 77 U/L (ref 45–117)
ALT SERPL-CCNC: 23 U/L (ref 12–78)
ANION GAP SERPL CALC-SCNC: 8 MMOL/L (ref 5–15)
AST SERPL W P-5'-P-CCNC: 20 U/L (ref 15–37)
BASOPHILS # BLD: 0.1 K/UL (ref 0–0.2)
BASOPHILS NFR BLD: 1 % (ref 0–2.5)
BILIRUB SERPL-MCNC: 0.2 MG/DL (ref 0.2–1)
BUN SERPL-MCNC: 27 MG/DL (ref 6–20)
BUN/CREAT SERPL: 14 (ref 12–20)
CA-I BLD-MCNC: 9.2 MG/DL (ref 8.5–10.1)
CHLORIDE SERPL-SCNC: 101 MMOL/L (ref 97–108)
CO2 SERPL-SCNC: 28 MMOL/L (ref 21–32)
CREAT SERPL-MCNC: 2 MG/DL (ref 0.7–1.3)
EOSINOPHIL # BLD: 0.1 K/UL (ref 0–0.7)
EOSINOPHIL NFR BLD: 1 % (ref 0.9–2.9)
ERYTHROCYTE [DISTWIDTH] IN BLOOD BY AUTOMATED COUNT: 15.3 % (ref 11.5–14.5)
GLOBULIN SER CALC-MCNC: 4.2 G/DL (ref 2–4)
GLUCOSE SERPL-MCNC: 203 MG/DL (ref 65–100)
HCT VFR BLD AUTO: 39.7 % (ref 41–53)
HGB BLD-MCNC: 13.3 G/DL (ref 13.5–17.5)
LIPASE SERPL-CCNC: 31 U/L (ref 73–393)
LYMPHOCYTES # BLD: 1.4 K/UL (ref 1–4.8)
LYMPHOCYTES NFR BLD: 19 % (ref 20.5–51.1)
MCH RBC QN AUTO: 32 PG (ref 31–34)
MCHC RBC AUTO-ENTMCNC: 33.5 G/DL (ref 31–36)
MCV RBC AUTO: 95.7 FL (ref 80–100)
MONOCYTES # BLD: 0.6 K/UL (ref 0.2–2.4)
MONOCYTES NFR BLD: 9 % (ref 1.7–9.3)
NEUTS SEG # BLD: 5.1 K/UL (ref 1.8–7.7)
NEUTS SEG NFR BLD: 70 % (ref 42–75)
NRBC # BLD: 0.01 K/UL
NRBC BLD-RTO: 0.1 PER 100 WBC
PLATELET # BLD AUTO: 288 K/UL (ref 150–400)
PMV BLD AUTO: 8.6 FL (ref 6.5–11.5)
POTASSIUM SERPL-SCNC: 4.3 MMOL/L (ref 3.5–5.1)
PROT SERPL-MCNC: 7.5 G/DL (ref 6.4–8.2)
RBC # BLD AUTO: 4.14 M/UL (ref 4.5–5.9)
SODIUM SERPL-SCNC: 137 MMOL/L (ref 136–145)
WBC # BLD AUTO: 7.2 K/UL (ref 4.4–11.3)

## 2021-05-24 PROCEDURE — 74011000250 HC RX REV CODE- 250: Performed by: EMERGENCY MEDICINE

## 2021-05-24 PROCEDURE — 83690 ASSAY OF LIPASE: CPT

## 2021-05-24 PROCEDURE — 74011250636 HC RX REV CODE- 250/636: Performed by: EMERGENCY MEDICINE

## 2021-05-24 PROCEDURE — 36415 COLL VENOUS BLD VENIPUNCTURE: CPT

## 2021-05-24 PROCEDURE — 80053 COMPREHEN METABOLIC PANEL: CPT

## 2021-05-24 PROCEDURE — 99284 EMERGENCY DEPT VISIT MOD MDM: CPT

## 2021-05-24 PROCEDURE — 96374 THER/PROPH/DIAG INJ IV PUSH: CPT

## 2021-05-24 PROCEDURE — 85025 COMPLETE CBC W/AUTO DIFF WBC: CPT

## 2021-05-24 RX ORDER — ONDANSETRON 4 MG/1
4 TABLET, ORALLY DISINTEGRATING ORAL
Qty: 10 TABLET | Refills: 0 | Status: SHIPPED | OUTPATIENT
Start: 2021-05-24

## 2021-05-24 RX ADMIN — FAMOTIDINE 20 MG: 10 INJECTION, SOLUTION INTRAVENOUS at 20:02

## 2021-05-24 NOTE — ED TRIAGE NOTES
Was seen in ER yesterday for c/o abd pain that started Friday, continues to have abd pain midline, denies diarrhea, vomited a couple times today, not able to take any am meds because he was vomiting,

## 2021-05-24 NOTE — ED PROVIDER NOTES
HPI   I saw patient yesterday for same complaint yesterday-generalized abdominal pain. Patient reports it is no better since yesterday and has been unable to take any p.o. or sleep due to the pain. He denies any new character or intensity of the pain, denies fever, change in bowel function or urinary habits, fall or trauma. Past Medical History:   Diagnosis Date    Asthma     B12 deficiency     CAD (coronary artery disease)     Cataract     Chronic obstructive pulmonary disease (HCC)     CKD (chronic kidney disease)     COVID-19     Depression     Diabetes (HCC)     DJD (degenerative joint disease)     Heart attack ([facility] [address]     Hypertension     Long term prescription opiate use     Neuropathy     Rheumatoid arthritis (HCC)     Ulcer        Past Surgical History:   Procedure Laterality Date    HX ANGIOPLASTY      HX HEENT      HX ORTHOPAEDIC      [place] CARDIAC SURG PROCEDURE UNLIST      RCA stent         Family History:   Problem Relation Age of Onset    Diabetes Mother     Asthma Mother     Diabetes Father        Social History     Socioeconomic History    Marital status: SINGLE     Spouse name: Not on file    Number of children: Not on file    Years of education: Not on file    Highest education level: Not on file   Occupational History    Not on file   Tobacco Use    Smoking status: Former Smoker    Smokeless tobacco: Former User   Substance and Sexual Activity    Alcohol use: No    Drug use: No    Sexual activity: Not on file   Other Topics Concern    Not on file   Social History Narrative    Not on file     Social Determinants of Health     Financial Resource Strain:     Difficulty of Paying Living Expenses:    Food Insecurity:     Worried About Running Out of Food in the Last Year:     [address] in the Last Year:    Transportation Needs:     Lack of Transportation (Medical):      Lack of Transportation (Non-Medical):    Physical Activity:     Days of Exercise per Week:  Minutes of Exercise per Session:    Stress:     Feeling of Stress :    Social Connections:     Frequency of Communication with Friends and Family:     Frequency of Social Gatherings with Friends and Family:     Attends Scientologist Services:     Active Member of Clubs or Organizations:     Attends Club or Organization Meetings:     Marital Status:    Intimate Partner Violence:     Fear of Current or Ex-Partner:     Emotionally Abused:     Physically Abused:     Sexually Abused: ALLERGIES: Patient has no known allergies. Review of Systems   Constitutional: Negative. HENT: Negative. Eyes: Negative. Respiratory: Negative. Cardiovascular: Negative. Gastrointestinal: Positive for abdominal pain, nausea and vomiting. Endocrine: Negative. Genitourinary: Negative. Musculoskeletal: Negative. Allergic/Immunologic: Negative. Neurological: Negative. Hematological: Negative. Psychiatric/Behavioral: Negative. All other systems reviewed and are negative. Vitals:    05/24/21 1832   BP: (!) 136/106   Pulse: 94   Resp: 18   Temp: 98.3 °F (36.8 °C)   SpO2: 99%   Weight: 99.8 kg (220 lb)   Height: 5' 8\" (1.727 m)            Physical Exam  Vitals and nursing note reviewed. Constitutional:       General: He is not in acute distress. Appearance: Normal appearance. He is obese. He is not ill-appearing, toxic-appearing or diaphoretic. HENT:      Head: Normocephalic and atraumatic. Nose: Nose normal.      Mouth/Throat:      Mouth: Mucous membranes are dry. Eyes:      Extraocular Movements: Extraocular movements intact. Conjunctiva/sclera: Conjunctivae normal.      Pupils: Pupils are equal, round, and reactive to light. Pulmonary:      Effort: Pulmonary effort is normal. No respiratory distress. Abdominal:      General: Abdomen is flat. There is no distension. Palpations: Abdomen is soft. There is no mass. Tenderness:  There is abdominal tenderness. There is no right CVA tenderness, left CVA tenderness, guarding or rebound. Hernia: No hernia is present. Comments: Mild and diffuse tenderness without peritoneal signs. Unchanged from yesterday   Musculoskeletal:         General: No swelling, tenderness, deformity or signs of injury. Normal range of motion. Cervical back: Normal range of motion. No rigidity. Right lower leg: No edema. Left lower leg: No edema. Skin:     General: Skin is warm. Coloration: Skin is not jaundiced or pale. Neurological:      General: No focal deficit present. Mental Status: He is alert and oriented to person, place, and time. Mental status is at baseline. Cranial Nerves: No cranial nerve deficit. Sensory: No sensory deficit. Motor: No weakness. Gait: Gait normal.   Psychiatric:         Mood and Affect: Mood normal.         Behavior: Behavior normal.          MDM     Patient's exam and symptoms are unchanged from yesterday. He had a reassuring evaluation yesterday to include labs and imaging. Reviewed imaging not indicated. Will recheck labs and if at baseline, okay for discharge. Will rule out dehydration or other thing we missed yesterday. Labs are again reassuring and at baseline. Patient is now sleeping with normal vital signs including O2 saturation and without signs of pain. Okay for discharge.   Procedures

## 2021-05-24 NOTE — DISCHARGE INSTRUCTIONS
As we discussed, your pain may be due to ulcer disease. This is diagnosed and treated by a gastroenterologist.  Referral attached.  Use OTC antacids as recommended by your pharmacist.

## 2021-05-25 NOTE — ED NOTES
Patient given and verbalized understanding of all discharge, medication, and follow-up instructions. Patient departed ED ambulatory, in good condition.

## 2021-06-04 ENCOUNTER — OFFICE VISIT (OUTPATIENT)
Dept: GASTROENTEROLOGY | Age: 76
End: 2021-06-04
Payer: MEDICARE

## 2021-06-04 VITALS
SYSTOLIC BLOOD PRESSURE: 150 MMHG | OXYGEN SATURATION: 97 % | WEIGHT: 210 LBS | HEART RATE: 100 BPM | TEMPERATURE: 97.9 F | HEIGHT: 68 IN | RESPIRATION RATE: 16 BRPM | BODY MASS INDEX: 31.83 KG/M2 | DIASTOLIC BLOOD PRESSURE: 80 MMHG

## 2021-06-04 DIAGNOSIS — K31.819 ANGIODYSPLASIA OF STOMACH: ICD-10-CM

## 2021-06-04 DIAGNOSIS — N18.30 STAGE 3 CHRONIC KIDNEY DISEASE, UNSPECIFIED WHETHER STAGE 3A OR 3B CKD (HCC): ICD-10-CM

## 2021-06-04 DIAGNOSIS — K92.2 GASTROINTESTINAL HEMORRHAGE, UNSPECIFIED GASTROINTESTINAL HEMORRHAGE TYPE: ICD-10-CM

## 2021-06-04 DIAGNOSIS — R10.13 EPIGASTRIC ABDOMINAL PAIN: Primary | ICD-10-CM

## 2021-06-04 DIAGNOSIS — K29.51 CHRONIC GASTRITIS WITH BLEEDING, UNSPECIFIED GASTRITIS TYPE: ICD-10-CM

## 2021-06-04 PROCEDURE — 3017F COLORECTAL CA SCREEN DOC REV: CPT | Performed by: INTERNAL MEDICINE

## 2021-06-04 PROCEDURE — 1101F PT FALLS ASSESS-DOCD LE1/YR: CPT | Performed by: INTERNAL MEDICINE

## 2021-06-04 PROCEDURE — G8536 NO DOC ELDER MAL SCRN: HCPCS | Performed by: INTERNAL MEDICINE

## 2021-06-04 PROCEDURE — 99214 OFFICE O/P EST MOD 30 MIN: CPT | Performed by: INTERNAL MEDICINE

## 2021-06-04 PROCEDURE — G8417 CALC BMI ABV UP PARAM F/U: HCPCS | Performed by: INTERNAL MEDICINE

## 2021-06-04 PROCEDURE — G8427 DOCREV CUR MEDS BY ELIG CLIN: HCPCS | Performed by: INTERNAL MEDICINE

## 2021-06-04 PROCEDURE — G8510 SCR DEP NEG, NO PLAN REQD: HCPCS | Performed by: INTERNAL MEDICINE

## 2021-06-04 RX ORDER — PANTOPRAZOLE SODIUM 40 MG/1
40 TABLET, DELAYED RELEASE ORAL
Qty: 30 TABLET | Refills: 1 | Status: SHIPPED | OUTPATIENT
Start: 2021-06-04

## 2021-06-04 RX ORDER — DICYCLOMINE HYDROCHLORIDE 10 MG/1
10 CAPSULE ORAL
Qty: 90 CAPSULE | Refills: 2 | Status: SHIPPED | OUTPATIENT
Start: 2021-06-04

## 2021-06-04 NOTE — PROGRESS NOTES
1. Have you been to the ER, urgent care clinic since your last visit? Hospitalized since your last visit? Yes 5-    2. Have you seen or consulted any other health care providers outside of the [address] [place] [address] since your last visit? Include any pap smears or colon screening.   No.   Chief Complaint   Patient presents with    ED Follow-up     5-      GI Problem     GI Bleed    Abdominal Pain     bad mid abd pain     Visit Vitals  BP (!) 150/80 (BP 1 Location: Left upper arm, BP Patient Position: Sitting, BP Cuff Size: Adult)   Pulse 100   Temp 97.9 °F (36.6 °C) (Temporal)   Resp 16   Ht 5' 8\" (1.727 m)   Wt 95.3 kg (210 lb)   SpO2 97% Comment: room air   BMI 31.93 kg/m²

## 2021-06-04 NOTE — PROGRESS NOTES
Gopi Liang is a 70 y.o. male who presents today for the following:  Chief Complaint   Patient presents with   [employer] ED Follow-up     5-      GI Problem     GI Bleed    Abdominal Pain     bad mid abd pain         No Known Allergies    Current Outpatient Medications   Medication Sig    dicyclomine (BENTYL) 10 mg capsule Take 1 Capsule by mouth three (3) times daily as needed for Abdominal Cramps. Indications: irritable colon    pantoprazole (PROTONIX) 40 mg tablet Take 1 Tablet by mouth Daily (before dinner).  ondansetron (Zofran ODT) 4 mg disintegrating tablet 1 Tablet by SubLINGual route every eight (8) hours as needed for Nausea or Vomiting.  albuterol (PROVENTIL HFA, VENTOLIN HFA, PROAIR HFA) 90 mcg/actuation inhaler Take 2 Puffs by inhalation every four (4) hours as needed for Wheezing.  gabapentin (NEURONTIN) 400 mg capsule Take 1 Cap by mouth three (3) times daily. Max Daily Amount: 1,200 mg.  ferrous sulfate 325 mg (65 mg iron) tablet Take 1 Tab by mouth two (2) times daily (with meals).  mometasone-formoterol (Dulera) 200-5 mcg/actuation HFA inhaler Take 2 Puffs by inhalation two (2) times a day.  acetaminophen (TYLENOL) 500 mg tablet TAKE 2 TABLETS BY MOUTH EVERY 6 (SIX) HOURS AS NEEDED FOR PAIN    atorvastatin (LIPITOR) 10 mg tablet Take 10 mg by mouth At bedtime.  cetirizine-pseudoePHEDrine (ZyrTEC-D) 5-120 mg Tb12 Take 1 Tab by mouth two (2) times daily as needed.  HumaLOG KwikPen Insulin 100 unit/mL kwikpen INJECT 26 UNITS UNDER THE SKIN IN THE MORNING AND 22 UNITS AT SUPPER    losartan (COZAAR) 50 mg tablet TAKE 1 TABLET BY MOUTH EVERY DAY    omeprazole (PRILOSEC) 40 mg capsule Take 40 mg by mouth daily.  traMADol (ULTRAM) 50 mg tablet Take 50 mg by mouth every six (6) hours as needed for Pain.  insulin aspart (NOVOLOG) 100 unit/mL injection 26 Units by SubCUTAneous route Every morning.     cholecalciferol (VITAMIN D3) 1,000 unit cap Take 1,000 Units by mouth daily.    cetirizine (ZYRTEC) 10 mg tablet Take 1 Tab by mouth daily. (Patient not taking: Reported on 6/4/2021)    insulin lispro (HUMALOG) 100 unit/mL kwikpen 22 Units by SubCUTAneous route. (Patient not taking: Reported on 6/4/2021)    insulin lispro (HUMALOG) 100 unit/mL injection by SubCUTAneous route. (Patient not taking: Reported on 6/4/2021)    insulin glargine (LANTUS) 100 unit/mL injection 60 Units by SubCUTAneous route nightly. (Patient not taking: Reported on 6/4/2021)    aspirin delayed-release 81 mg tablet Take 81 mg by mouth daily. (Patient not taking: Reported on 6/4/2021)     No current facility-administered medications for this visit.        Past Medical History:   Diagnosis Date    Asthma     B12 deficiency     CAD (coronary artery disease)     Cataract     Chronic obstructive pulmonary disease (HCC)     CKD (chronic kidney disease)     COVID-19     Depression     Diabetes (HCC)     DJD (degenerative joint disease)     [facility] ([facility] [address]     Hypertension     Long term prescription opiate use     Neuropathy     Rheumatoid arthritis ([facility] [address]     Ulcer        Past Surgical History:   Procedure Laterality Date    HX ANGIOPLASTY      HX HEENT      HX ORTHOPAEDIC      [place] CARDIAC SURG PROCEDURE UNLIST      RCA stent       Family History   Problem Relation Age of Onset    Diabetes Mother     Asthma Mother     Diabetes Father        Social History     Socioeconomic History    Marital status: SINGLE     Spouse name: Not on file    Number of children: Not on file    Years of education: Not on file    Highest education level: Not on file   Occupational History    Not on file   Tobacco Use    Smoking status: Former Smoker    Smokeless tobacco: Former User   Substance and Sexual Activity    Alcohol use: No    Drug use: No    Sexual activity: Not on file   Other Topics Concern    Not on file   Social History Narrative    Not on file     Social Determinants of Health Financial Resource Strain:     Difficulty of Paying Living Expenses:    Food Insecurity:     Worried About Running Out of Food in the Last Year:     [address] in the Last Year:    Transportation Needs:     Lack of Transportation (Medical):  Lack of Transportation (Non-Medical):    Physical Activity:     Days of Exercise per Week:     Minutes of Exercise per Session:    Stress:     Feeling of Stress :    Social Connections:     Frequency of Communication with Friends and Family:     Frequency of Social Gatherings with Friends and Family:     Attends Anglican Services:     Active Member of Clubs or Organizations:     Attends Club or Organization Meetings:     Marital Status:    Intimate Partner Violence:     Fear of Current or Ex-Partner:     Emotionally Abused:     Physically Abused:     Sexually Abused:          HPI  24-year-old male with history of hypertension, diabetes mellitus type 2, COPD, chronic kidney disease stage III, and COVID-19 infection in February 2021 who comes in for follow-up visit after being seen while hospitalized with GI bleeding. The patient had been started on Eliquis for DVT prophylaxis after the COVID-19 infection. He did not develop gross GI bleeding and was hospitalized. I saw the patient on 3/19/2021 and an EGD was done on that day which showed antral gastritis and angiodysplasias involving gastric body. The patient was taken off the Eliquis. He has also been on omeprazole 40 mg daily. Patient states for the last couple weeks he has had mid epigastric abdominal pain which has been continuous. He has been to the emergency room on 2 separate occasions recently. He states he is eating okay. Bowel movements are more constipated. No gross GI bleeding noted. No black or tarry stools. He denies use of any alcohol or NSAIDs. Review of Systems   Constitutional: Negative. HENT: Negative. Negative for nosebleeds. Eyes: Negative.     Respiratory: Negative. Cardiovascular: Negative. Gastrointestinal: Positive for abdominal pain, constipation and heartburn. Negative for blood in stool, diarrhea, melena, nausea and vomiting. Genitourinary: Negative. Musculoskeletal: Positive for joint pain. Skin: Negative. Neurological: Negative. Endo/Heme/Allergies: Negative. Psychiatric/Behavioral: Negative. All other systems reviewed and are negative. Visit Vitals  BP (!) 150/80 (BP 1 Location: Left upper arm, BP Patient Position: Sitting, BP Cuff Size: Adult)   Pulse 100   Temp 97.9 °F (36.6 °C) (Temporal)   Resp 16   Ht 5' 8\" (1.727 m)   Wt 95.3 kg (210 lb)   SpO2 97% Comment: room air   BMI 31.93 kg/m²     Physical Exam  Vitals and nursing note reviewed. Constitutional:       Appearance: Normal appearance. He is obese. HENT:      Head: Normocephalic and atraumatic. Nose: Nose normal.      Mouth/Throat:      Mouth: Mucous membranes are moist.      Pharynx: Oropharynx is clear. Eyes:      General: No scleral icterus. Extraocular Movements: Extraocular movements intact. Conjunctiva/sclera: Conjunctivae normal.      Pupils: Pupils are equal, round, and reactive to light. Cardiovascular:      Rate and Rhythm: Normal rate and regular rhythm. Heart sounds: Normal heart sounds. Pulmonary:      Effort: Pulmonary effort is normal.      Breath sounds: Normal breath sounds. Abdominal:      General: Bowel sounds are normal. There is distension. Palpations: Abdomen is soft. There is no mass. Tenderness: There is abdominal tenderness. There is guarding. There is no right CVA tenderness, left CVA tenderness or rebound. Hernia: No hernia is present. Musculoskeletal:         General: Normal range of motion. Cervical back: Normal range of motion and neck supple. Skin:     General: Skin is warm and dry. Coloration: Skin is not jaundiced. Neurological:      General: No focal deficit present. Mental Status: He is alert and oriented to person, place, and time. Psychiatric:         Mood and Affect: Mood normal.         Behavior: Behavior normal.         Thought Content: Thought content normal.         Judgment: Judgment normal.            1. Epigastric abdominal pain  Gastritis versus peptic ulcer disease  Will give patient Bentyl for crampy abdominal pain. Will start patient on pantoprazole 40mg to evening meal.  He may continue the omeprazole for morning meals. We will plan to monitor those medications on next visit. - dicyclomine (BENTYL) 10 mg capsule; Take 1 Capsule by mouth three (3) times daily as needed for Abdominal Cramps. Indications: irritable colon  Dispense: 90 Capsule; Refill: 2  - pantoprazole (PROTONIX) 40 mg tablet; Take 1 Tablet by mouth Daily (before dinner). Dispense: 30 Tablet; Refill: 1    2. Chronic gastritis with bleeding, unspecified gastritis type    - pantoprazole (PROTONIX) 40 mg tablet; Take 1 Tablet by mouth Daily (before dinner). Dispense: 30 Tablet; Refill: 1    3. Angiodysplasia of stomach      4. Gastrointestinal hemorrhage, unspecified gastrointestinal hemorrhage type  No gross evidence of return of bleeding.     5. Stage 3 chronic kidney disease, unspecified whether stage 3a or 3b [facility] ([facility]

## 2021-07-01 ENCOUNTER — TRANSCRIBE ORDER (OUTPATIENT)
Dept: SCHEDULING | Age: 76
End: 2021-07-01

## 2021-07-01 DIAGNOSIS — R00.2 PALPITATIONS: ICD-10-CM

## 2021-07-01 DIAGNOSIS — I42.1 IHSS (IDIOPATHIC HYPERTROPHIC SUBAORTIC STENOSIS) (HCC): Primary | ICD-10-CM

## 2021-07-09 ENCOUNTER — HOSPITAL ENCOUNTER (OUTPATIENT)
Dept: NON INVASIVE DIAGNOSTICS | Age: 76
Discharge: HOME | End: 2021-07-09
Attending: INTERNAL MEDICINE
Payer: MEDICARE

## 2021-07-09 VITALS
BODY MASS INDEX: 31.83 KG/M2 | DIASTOLIC BLOOD PRESSURE: 70 MMHG | WEIGHT: 210 LBS | SYSTOLIC BLOOD PRESSURE: 142 MMHG | HEIGHT: 68 IN

## 2021-07-09 DIAGNOSIS — R00.2 PALPITATIONS: ICD-10-CM

## 2021-07-09 DIAGNOSIS — I42.1 IHSS (IDIOPATHIC HYPERTROPHIC SUBAORTIC STENOSIS) (HCC): ICD-10-CM

## 2021-07-09 LAB
ECHO AO ROOT DIAM: 3.5 CM
ECHO AV AREA PEAK VELOCITY: 3.14 CM2
ECHO AV AREA PLAN/BSA: 1.35
ECHO AV AREA VTI: 2.83 CM2
ECHO AV AREA/BSA PEAK VELOCITY: 1.5 CM2/M2
ECHO AV AREA/BSA VTI: 1.4 CM2/M2
ECHO AV CUSP MM: 1.9 CM
ECHO AV MEAN GRADIENT: 4 MMHG
ECHO AV MEAN VELOCITY: 89.4 CM/S
ECHO AV PEAK GRADIENT: 7 MMHG
ECHO AV PEAK VELOCITY: 131 CM/S
ECHO AV VTI: 23.6 CM
ECHO EST RA PRESSURE: 3 MMHG
ECHO LA AREA 2C: 16.2 CM2
ECHO LA AREA 4C: 15 CM2
ECHO LA MAJOR AXIS: 3.2 CM
ECHO LA MAJOR AXIS: 4.73 CM
ECHO LA TO AORTIC ROOT RATIO: 0.91
ECHO LV E' LATERAL VELOCITY: 9.03 CM/S
ECHO LV E' SEPTAL VELOCITY: 3.92 CM/S
ECHO LV EDV A2C: 85.2 CM3
ECHO LV EJECTION FRACTION BIPLANE: 50 % (ref 55–100)
ECHO LV ESV A2C: 35.9 CM3
ECHO LV INTERNAL DIMENSION DIASTOLIC: 4.4 CM (ref 4.2–5.9)
ECHO LV INTERNAL DIMENSION SYSTOLIC: 3.3 CM
ECHO LV IVSD: 1.1 CM (ref 0.6–1)
ECHO LV MASS 2D: 168.9 G (ref 88–224)
ECHO LV MASS INDEX 2D: 80.8 G/M2 (ref 49–115)
ECHO LV POSTERIOR WALL DIASTOLIC: 1.1 CM (ref 0.6–1)
ECHO LVOT DIAM: 2.1 CM
ECHO LVOT PEAK GRADIENT: 6 MMHG
ECHO LVOT PEAK VELOCITY: 119 CM/S
ECHO LVOT SV: 67 CM3
ECHO LVOT VTI: 18.7 CM
ECHO LVOT VTI: 19.3 CM
ECHO LVOT VTI: 19.9 CM
ECHO MV A VELOCITY: 118 CM/S
ECHO MV AREA PHT: 3.24 CM2
ECHO MV E DECELERATION TIME (DT): 232 MS
ECHO MV E VELOCITY: 87.8 CM/S
ECHO MV E/A RATIO: 0.74
ECHO MV E/E' LATERAL: 9.72
ECHO MV E/E' RATIO (AVERAGED): 16.06
ECHO MV E/E' SEPTAL: 22.4
ECHO MV PRESSURE HALF TIME (PHT): 68 MS
ECHO RA AREA 4C: 14.9 CM2
ECHO RA MAJOR AXIS: 5.05 CM
ECHO RA MINOR AXIS: 3.4 CM
ECHO RIGHT VENTRICULAR SYSTOLIC PRESSURE (RVSP): 21 MMHG
ECHO RV INTERNAL DIMENSION: 2.8 CM
ECHO RV TAPSE: 1.89 CM (ref 1.5–2)
ECHO TV MEAN GRADIENT: 18 MMHG
ECHO TV REGURGITANT MAX VELOCITY: 214 CM/S
LA VOL DISK BP: 39.1 CM3 (ref 18–58)
LVOT MG: 3 MMHG
MV DEC SLOPE: 3780 MM/S2
MV DEC SLOPE: 3780 MM/S2

## 2021-07-09 PROCEDURE — 93306 TTE W/DOPPLER COMPLETE: CPT

## 2021-07-09 PROCEDURE — 93226 XTRNL ECG REC<48 HR SCAN A/R: CPT

## 2021-07-29 ENCOUNTER — OFFICE VISIT (OUTPATIENT)
Dept: GASTROENTEROLOGY | Age: 76
End: 2021-07-29
Payer: MEDICARE

## 2021-07-29 VITALS
TEMPERATURE: 98.5 F | WEIGHT: 214.2 LBS | HEIGHT: 68 IN | SYSTOLIC BLOOD PRESSURE: 140 MMHG | RESPIRATION RATE: 14 BRPM | BODY MASS INDEX: 32.46 KG/M2 | DIASTOLIC BLOOD PRESSURE: 60 MMHG | OXYGEN SATURATION: 95 % | HEART RATE: 87 BPM

## 2021-07-29 DIAGNOSIS — K31.819 ANGIODYSPLASIA OF UPPER GASTROINTESTINAL TRACT: ICD-10-CM

## 2021-07-29 DIAGNOSIS — K92.2 GASTROINTESTINAL HEMORRHAGE, UNSPECIFIED GASTROINTESTINAL HEMORRHAGE TYPE: Primary | ICD-10-CM

## 2021-07-29 DIAGNOSIS — K29.50 CHRONIC ANTRAL GASTRITIS: ICD-10-CM

## 2021-07-29 DIAGNOSIS — R10.13 EPIGASTRIC PAIN: ICD-10-CM

## 2021-07-29 DIAGNOSIS — K22.89 ANGIODYSPLASIA OF UPPER GASTROINTESTINAL TRACT: ICD-10-CM

## 2021-07-29 PROCEDURE — 1101F PT FALLS ASSESS-DOCD LE1/YR: CPT | Performed by: INTERNAL MEDICINE

## 2021-07-29 PROCEDURE — 3017F COLORECTAL CA SCREEN DOC REV: CPT | Performed by: INTERNAL MEDICINE

## 2021-07-29 PROCEDURE — G8417 CALC BMI ABV UP PARAM F/U: HCPCS | Performed by: INTERNAL MEDICINE

## 2021-07-29 PROCEDURE — 99213 OFFICE O/P EST LOW 20 MIN: CPT | Performed by: INTERNAL MEDICINE

## 2021-07-29 PROCEDURE — G8510 SCR DEP NEG, NO PLAN REQD: HCPCS | Performed by: INTERNAL MEDICINE

## 2021-07-29 PROCEDURE — G8536 NO DOC ELDER MAL SCRN: HCPCS | Performed by: INTERNAL MEDICINE

## 2021-07-29 PROCEDURE — G8427 DOCREV CUR MEDS BY ELIG CLIN: HCPCS | Performed by: INTERNAL MEDICINE

## 2021-07-29 NOTE — PROGRESS NOTES
1. Have you been to the ER, urgent care clinic since your last visit? Hospitalized since your last visit? NO    2. Have you seen or consulted any other health care providers outside of the [address] [place] [address] since your last visit? Include any pap smears or colon screening.  NO    Chief Complaint   Patient presents with    Follow-up     ov on 6-4-2021     GI Bleed on 5-     Visit Vitals  BP (!) 140/60 (BP 1 Location: Left upper arm, BP Patient Position: Sitting, BP Cuff Size: Adult)   Pulse 87   Temp 98.5 °F (36.9 °C) (Temporal)   Resp 14   Ht 5' 8\" (1.727 m)   Wt 97.2 kg (214 lb 3.2 oz)   SpO2 95% Comment: room air   BMI 32.57 kg/m²

## 2021-07-30 NOTE — PROGRESS NOTES
Genesis Lopez is a 70 y.o. male who presents today for the following:  Chief Complaint   Patient presents with    Follow-up     ov on 6-4-2021     GI Bleed on 5-         No Known Allergies    Current Outpatient Medications   Medication Sig    albuterol (PROVENTIL HFA, VENTOLIN HFA, PROAIR HFA) 90 mcg/actuation inhaler Take 2 Puffs by inhalation every four (4) hours as needed for Wheezing.  gabapentin (NEURONTIN) 400 mg capsule Take 1 Cap by mouth three (3) times daily. Max Daily Amount: 1,200 mg. (Patient taking differently: Take 400 mg by mouth three (3) times daily. This was just prescribed today.)    ferrous sulfate 325 mg (65 mg iron) tablet Take 1 Tab by mouth two (2) times daily (with meals).  mometasone-formoterol (Dulera) 200-5 mcg/actuation HFA inhaler Take 2 Puffs by inhalation two (2) times a day.  acetaminophen (TYLENOL) 500 mg tablet TAKE 2 TABLETS BY MOUTH EVERY 6 (SIX) HOURS AS NEEDED FOR PAIN    atorvastatin (LIPITOR) 10 mg tablet Take 10 mg by mouth At bedtime.  HumaLOG KwikPen Insulin 100 unit/mL kwikpen Says  30u in am and 80 q hs    losartan (COZAAR) 50 mg tablet TAKE 1 TABLET BY MOUTH EVERY DAY    cholecalciferol (VITAMIN D3) 1,000 unit cap Take 1,000 Units by mouth daily.  dicyclomine (BENTYL) 10 mg capsule Take 1 Capsule by mouth three (3) times daily as needed for Abdominal Cramps. Indications: irritable colon (Patient not taking: Reported on 7/29/2021)    pantoprazole (PROTONIX) 40 mg tablet Take 1 Tablet by mouth Daily (before dinner). (Patient not taking: Reported on 7/29/2021)    ondansetron (Zofran ODT) 4 mg disintegrating tablet 1 Tablet by SubLINGual route every eight (8) hours as needed for Nausea or Vomiting.  cetirizine (ZYRTEC) 10 mg tablet Take 1 Tab by mouth daily. (Patient not taking: Reported on 6/4/2021)    cetirizine-pseudoePHEDrine (ZyrTEC-D) 5-120 mg Tb12 Take 1 Tab by mouth two (2) times daily as needed.  (Patient not taking: Reported on 7/29/2021)    insulin lispro (HUMALOG) 100 unit/mL kwikpen 22 Units by SubCUTAneous route. (Patient not taking: Reported on 6/4/2021)    insulin lispro (HUMALOG) 100 unit/mL injection by SubCUTAneous route. (Patient not taking: Reported on 6/4/2021)    omeprazole (PRILOSEC) 40 mg capsule Take 40 mg by mouth daily. (Patient not taking: Reported on 7/29/2021)    traMADol (ULTRAM) 50 mg tablet Take 50 mg by mouth every six (6) hours as needed for Pain. (Patient not taking: Reported on 7/29/2021)    insulin glargine (LANTUS) 100 unit/mL injection 60 Units by SubCUTAneous route nightly. (Patient not taking: Reported on 6/4/2021)    insulin aspart (NOVOLOG) 100 unit/mL injection 26 Units by SubCUTAneous route Every morning. (Patient not taking: Reported on 7/29/2021)    aspirin delayed-release 81 mg tablet Take 81 mg by mouth daily. (Patient not taking: Reported on 6/4/2021)     No current facility-administered medications for this visit.        Past Medical History:   Diagnosis Date    Asthma     B12 deficiency     CAD (coronary artery disease)     Cataract     Chronic obstructive pulmonary disease (HCC)     CKD (chronic kidney disease)     COVID-19     Depression     Diabetes (HCC)     DJD (degenerative joint disease)     [facility] [address]     Hypertension     Long term prescription opiate use     Neuropathy     Rheumatoid arthritis ([facility] [address]     Ulcer        Past Surgical History:   Procedure Laterality Date    HX ANGIOPLASTY      HX HEENT      HX ORTHOPAEDIC      FL CARDIAC SURG PROCEDURE UNLIST      RCA stent       Family History   Problem Relation Age of Onset    Diabetes Mother     Asthma Mother     Diabetes Father        Social History     Socioeconomic History    Marital status: SINGLE     Spouse name: Not on file    Number of children: Not on file    Years of education: Not on file    Highest education level: Not on file   Occupational History    Not on file   Tobacco Use    Smoking status: Former Smoker    Smokeless tobacco: Former User   Substance and Sexual Activity    Alcohol use: No    Drug use: No    Sexual activity: Not on file   Other Topics Concern    Not on file   Social History Narrative    Not on file     Social Determinants of Health     Financial Resource Strain:     Difficulty of Paying Living Expenses:    Food Insecurity:     Worried About Running Out of Food in the Last Year:     [address] in the Last Year:    Transportation Needs:     Lack of Transportation (Medical):  Lack of Transportation (Non-Medical):    Physical Activity:     Days of Exercise per Week:     Minutes of Exercise per Session:    Stress:     Feeling of Stress :    Social Connections:     Frequency of Communication with Friends and Family:     Frequency of Social Gatherings with Friends and Family:     Attends Church Services:     Active Member of Clubs or Organizations:     Attends Club or Organization Meetings:     Marital Status:    Intimate Partner Violence:     Fear of Current or Ex-Partner:     Emotionally Abused:     Physically Abused:     Sexually Abused:          HPI  29-year-old male with history of COPD, chronic kidney disease, asthma, coronary artery disease, diabetes mellitus type 2, and status post COVID-19 infection who comes in for follow-up visit. Patient states that the pantoprazole has worked well. He stopped taking it times approximately 1 week secondary to being asymptomatic. He states he still have some pills left. No more abdominal pain. He is eating well and has good appetite. His bowel movements are regular and formed. He may use a laxative tea which works very well. Review of Systems   Constitutional: Negative. HENT: Negative. Negative for nosebleeds. Eyes: Negative. Respiratory: Negative. Cardiovascular: Negative. Gastrointestinal: Negative.   Negative for abdominal pain, blood in stool, constipation, diarrhea, heartburn, melena, nausea and vomiting. Genitourinary: Negative. Musculoskeletal: Negative. Skin: Negative. Neurological: Negative. Endo/Heme/Allergies: Negative. Psychiatric/Behavioral: Negative. All other systems reviewed and are negative. Visit Vitals  BP (!) 140/60 (BP 1 Location: Left upper arm, BP Patient Position: Sitting, BP Cuff Size: Adult)   Pulse 87   Temp 98.5 °F (36.9 °C) (Temporal)   Resp 14   Ht 5' 8\" (1.727 m)   Wt 97.2 kg (214 lb 3.2 oz)   SpO2 95% Comment: room air   BMI 32.57 kg/m²     Physical Exam  Vitals and nursing note reviewed. Constitutional:       General: He is not in acute distress. Appearance: Normal appearance. He is obese. He is not ill-appearing. HENT:      Head: Normocephalic and atraumatic. Nose: Nose normal.      Mouth/Throat:      Mouth: Mucous membranes are moist.      Pharynx: Oropharynx is clear. Eyes:      General: No scleral icterus. Extraocular Movements: Extraocular movements intact. Conjunctiva/sclera: Conjunctivae normal.      Pupils: Pupils are equal, round, and reactive to light. Cardiovascular:      Rate and Rhythm: Normal rate and regular rhythm. Heart sounds: Normal heart sounds. Pulmonary:      Effort: Pulmonary effort is normal.      Breath sounds: Normal breath sounds. Abdominal:      General: Bowel sounds are normal. There is no distension. Palpations: Abdomen is soft. There is no mass. Tenderness: There is no abdominal tenderness. There is no right CVA tenderness, left CVA tenderness, guarding or rebound. Hernia: No hernia is present. Musculoskeletal:         General: Normal range of motion. Cervical back: Normal range of motion and neck supple. Skin:     General: Skin is warm and dry. Coloration: Skin is not jaundiced. Neurological:      General: No focal deficit present. Mental Status: He is alert and oriented to person, place, and time.    Psychiatric: Mood and Affect: Mood normal.         Behavior: Behavior normal.         Thought Content: Thought content normal.         Judgment: Judgment normal.            1. Gastrointestinal hemorrhage, unspecified gastrointestinal hemorrhage type  No further evidence of GI bleeding. 2. Epigastric pain  Resolved    3. Chronic antral gastritis  Improved    4.  Angiodysplasia of upper gastrointestinal tract  No evidence of continued bleeding

## 2021-12-08 ENCOUNTER — TRANSCRIBE ORDER (OUTPATIENT)
Dept: SCHEDULING | Age: 76
End: 2021-12-08

## 2021-12-08 DIAGNOSIS — R94.31 ABNORMAL EKG: ICD-10-CM

## 2021-12-08 DIAGNOSIS — R00.2 PALPITATIONS: Primary | ICD-10-CM

## 2021-12-28 ENCOUNTER — HOSPITAL ENCOUNTER (OUTPATIENT)
Dept: NUCLEAR MEDICINE | Age: 76
Discharge: HOME | End: 2021-12-28
Attending: INTERNAL MEDICINE
Payer: MEDICARE

## 2021-12-28 ENCOUNTER — HOSPITAL ENCOUNTER (OUTPATIENT)
Dept: NON INVASIVE DIAGNOSTICS | Age: 76
Discharge: HOME | End: 2021-12-28
Attending: INTERNAL MEDICINE
Payer: MEDICARE

## 2021-12-28 VITALS
WEIGHT: 210 LBS | BODY MASS INDEX: 31.83 KG/M2 | HEART RATE: 120 BPM | HEIGHT: 68 IN | DIASTOLIC BLOOD PRESSURE: 105 MMHG | SYSTOLIC BLOOD PRESSURE: 160 MMHG

## 2021-12-28 DIAGNOSIS — R00.2 PALPITATIONS: ICD-10-CM

## 2021-12-28 DIAGNOSIS — R94.31 ABNORMAL EKG: ICD-10-CM

## 2021-12-28 LAB
NUC STRESS EJECTION FRACTION: 36 %
STRESS BASELINE HR: 68 BPM
STRESS BASELINE ST DEPRESSION: 0 MM
STRESS ESTIMATED WORKLOAD: 1 METS
STRESS EXERCISE DUR MIN: NORMAL
STRESS PEAK DIAS BP: 80 MMHG
STRESS PEAK SYS BP: 159 MMHG
STRESS PERCENT HR ACHIEVED: 57 %
STRESS POST PEAK HR: 83 BPM
STRESS RATE PRESSURE PRODUCT: NORMAL BPM*MMHG
STRESS ST DEPRESSION: 0 MM
STRESS TARGET HR: 146 BPM

## 2021-12-28 PROCEDURE — 93226 XTRNL ECG REC<48 HR SCAN A/R: CPT

## 2021-12-28 PROCEDURE — A9500 TC99M SESTAMIBI: HCPCS

## 2021-12-28 PROCEDURE — 74011250636 HC RX REV CODE- 250/636: Performed by: INTERNAL MEDICINE

## 2021-12-28 PROCEDURE — 93017 CV STRESS TEST TRACING ONLY: CPT

## 2021-12-28 RX ORDER — TETRAKIS(2-METHOXYISOBUTYLISOCYANIDE)COPPER(I) TETRAFLUOROBORATE 1 MG/ML
9.3 INJECTION, POWDER, LYOPHILIZED, FOR SOLUTION INTRAVENOUS
Status: COMPLETED | OUTPATIENT
Start: 2021-12-28 | End: 2021-12-28

## 2021-12-28 RX ORDER — TETRAKIS(2-METHOXYISOBUTYLISOCYANIDE)COPPER(I) TETRAFLUOROBORATE 1 MG/ML
34 INJECTION, POWDER, LYOPHILIZED, FOR SOLUTION INTRAVENOUS
Status: COMPLETED | OUTPATIENT
Start: 2021-12-28 | End: 2021-12-28

## 2021-12-28 RX ADMIN — REGADENOSON 0.4 MG: 0.08 INJECTION, SOLUTION INTRAVENOUS at 09:37

## 2021-12-28 RX ADMIN — TETRAKIS(2-METHOXYISOBUTYLISOCYANIDE)COPPER(I) TETRAFLUOROBORATE 34 MILLICURIE: 1 INJECTION, POWDER, LYOPHILIZED, FOR SOLUTION INTRAVENOUS at 10:00

## 2021-12-28 RX ADMIN — TETRAKIS(2-METHOXYISOBUTYLISOCYANIDE)COPPER(I) TETRAFLUOROBORATE 9.3 MILLICURIE: 1 INJECTION, POWDER, LYOPHILIZED, FOR SOLUTION INTRAVENOUS at 08:20

## (undated) DEVICE — SPONGE GZ W4XL4IN COT 12 PLY TYP VII WVN C FLD DSGN

## (undated) DEVICE — 1200CC GUARDIAN II: Brand: GUARDIAN

## (undated) DEVICE — JELLY,LUBE,STERILE,FLIP TOP,TUBE,4-OZ: Brand: MEDLINE

## (undated) DEVICE — PAD,PREPPING,CUFFED,24X48,7",NONSTERILE: Brand: MEDLINE

## (undated) DEVICE — TUBING, SUCTION, 9/32" X 10', STRAIGHT: Brand: MEDLINE

## (undated) DEVICE — STERILE POLYISOPRENE POWDER-FREE SURGICAL GLOVES: Brand: PROTEXIS

## (undated) DEVICE — WASH CLOTH INCONT LO LINT PREM 12X13 IN LF DISP

## (undated) DEVICE — FCPS RAD JAW 4 SC 240CM W/NDL --

## (undated) DEVICE — SOL IRR STRL H2O 1000ML BTL --